# Patient Record
Sex: FEMALE | Race: WHITE | Employment: PART TIME | URBAN - METROPOLITAN AREA
[De-identification: names, ages, dates, MRNs, and addresses within clinical notes are randomized per-mention and may not be internally consistent; named-entity substitution may affect disease eponyms.]

---

## 2017-06-20 DIAGNOSIS — E03.9 ACQUIRED HYPOTHYROIDISM: ICD-10-CM

## 2017-06-20 NOTE — TELEPHONE ENCOUNTER
Requested Prescriptions     Pending Prescriptions Disp Refills    levothyroxine (SYNTHROID) 25 mcg tablet 90 Tab 1     Sig: Take 1 Tab by mouth Daily (before breakfast).    pt last [id number] up coming appt with dr Leslee Segovia [id number] and pt is out wants it send to [address] [facility] at [phone]

## 2017-06-21 RX ORDER — LEVOTHYROXINE SODIUM 25 UG/1
25 TABLET ORAL
Qty: 90 TAB | Refills: 1 | Status: SHIPPED | OUTPATIENT
Start: 2017-06-21 | End: 2017-10-27 | Stop reason: SDUPTHER

## 2017-06-27 ENCOUNTER — OFFICE VISIT (OUTPATIENT)
Dept: INTERNAL MEDICINE CLINIC | Age: 57
End: 2017-06-27

## 2017-06-27 ENCOUNTER — HOSPITAL ENCOUNTER (OUTPATIENT)
Dept: LAB | Age: 57
Discharge: HOME | End: 2017-06-27
Payer: MEDICARE

## 2017-06-27 VITALS
TEMPERATURE: 98.5 F | DIASTOLIC BLOOD PRESSURE: 80 MMHG | BODY MASS INDEX: 50.69 KG/M2 | SYSTOLIC BLOOD PRESSURE: 130 MMHG | HEART RATE: 97 BPM | OXYGEN SATURATION: 97 % | WEIGHT: 268.5 LBS | HEIGHT: 61 IN | RESPIRATION RATE: 18 BRPM

## 2017-06-27 DIAGNOSIS — E55.9 VITAMIN D DEFICIENCY: ICD-10-CM

## 2017-06-27 DIAGNOSIS — Z13.39 SCREENING FOR ALCOHOLISM: ICD-10-CM

## 2017-06-27 DIAGNOSIS — Z12.39 BREAST CANCER SCREENING: ICD-10-CM

## 2017-06-27 DIAGNOSIS — E66.01 MORBID OBESITY, UNSPECIFIED OBESITY TYPE (HCC): ICD-10-CM

## 2017-06-27 DIAGNOSIS — Z13.31 SCREENING FOR DEPRESSION: ICD-10-CM

## 2017-06-27 DIAGNOSIS — E03.9 HYPOTHYROIDISM, UNSPECIFIED TYPE: ICD-10-CM

## 2017-06-27 DIAGNOSIS — R10.11 RUQ PAIN: ICD-10-CM

## 2017-06-27 DIAGNOSIS — E78.00 PURE HYPERCHOLESTEROLEMIA: ICD-10-CM

## 2017-06-27 DIAGNOSIS — Z00.00 ROUTINE GENERAL MEDICAL EXAMINATION AT A HEALTH CARE FACILITY: Primary | ICD-10-CM

## 2017-06-27 DIAGNOSIS — Z12.31 ENCOUNTER FOR SCREENING MAMMOGRAM FOR MALIGNANT NEOPLASM OF BREAST: ICD-10-CM

## 2017-06-27 PROCEDURE — 80053 COMPREHEN METABOLIC PANEL: CPT

## 2017-06-27 PROCEDURE — 36415 COLL VENOUS BLD VENIPUNCTURE: CPT

## 2017-06-27 PROCEDURE — 80061 LIPID PANEL: CPT

## 2017-06-27 RX ORDER — VENLAFAXINE HYDROCHLORIDE 150 MG/1
150 CAPSULE, EXTENDED RELEASE ORAL DAILY
COMMUNITY

## 2017-06-27 NOTE — MR AVS SNAPSHOT
Visit Information Date & Time Provider Department Dept. Phone Encounter #  
 6/27/2017  9:15 AM Yong Doherty MD [place] [id number] [id number] Follow-up Instructions Return in about 4 months (around 10/27/2017) for F/U GERD, obesity. Upcoming Health Maintenance Date Due DTaP/Tdap/Td series (1 - Tdap) 11/24/2010 BREAST CANCER SCRN MAMMOGRAM 8/31/2017* INFLUENZA AGE 9 TO ADULT 8/1/2017 PAP AKA CERVICAL CYTOLOGY 5/12/2018 MEDICARE YEARLY EXAM 6/28/2018 COLONOSCOPY 9/20/2022 *Topic was postponed. The date shown is not the original due date. Allergies as of 6/27/2017  Review Complete On: 6/27/2017 By: Yong Doherty MD  
  
 Severity Noted Reaction Type Reactions Flagyl [Metronidazole]  05/07/2010   Intolerance Other (comments)  
 ams Current Immunizations  Never Reviewed Name Date  
 TB Skin Test (PPD) 11/1/2013 TD Vaccine 11/23/2010 11:09 PM  
  
 Not reviewed this visit You Were Diagnosed With   
  
 Codes Comments Breast cancer screening    -  Primary ICD-10-CM: Z12.39 
ICD-9-CM: V76.10 Encounter for screening mammogram for malignant neoplasm of breast     ICD-10-CM: Z12.31 
ICD-9-CM: V76.12 Morbid obesity, unspecified obesity type     ICD-10-CM: E66.01 
ICD-9-CM: 278.01   
 Pure hypercholesterolemia     ICD-10-CM: E78.00 ICD-9-CM: 272.0 Hypothyroidism, unspecified type     ICD-10-CM: E03.9 ICD-9-CM: 571. 9 Vitamin D deficiency     ICD-10-CM: E55.9 ICD-9-CM: 268.9 RUQ pain     ICD-10-CM: R10.11 ICD-9-CM: 789.01 Vitals BP Pulse Temp Resp Height(growth percentile) Weight(growth percentile) 130/80 (BP 1 Location: Right arm, BP Patient Position: Sitting) 97 98.5 °F (36.9 °C) (Oral) 18 5' 1.25\" (1.556 m) 268 lb 8 oz (121.8 kg) SpO2 BMI OB Status Smoking Status 97% 50.32 kg/m2 Postmenopausal Never Smoker Vitals History BMI and BSA Data Body Mass Index Body Surface Area  
 50.32 kg/m 2 2.29 m 2 Preferred Pharmacy Pharmacy Name Phone [address] [place] [phone] Your Updated Medication List  
  
   
This list is accurate as of: 6/27/17  9:59 AM.  Always use your most recent med list.  
  
  
  
  
 cpap machine kit  
by Does Not Apply route. Dx 327.23  
  
 EFFEXOR  mg capsule Generic drug:  venlafaxine-SR Take  by mouth daily. LaMICtal 100 mg tablet Generic drug:  lamoTRIgine Take 200 mg by mouth nightly. levothyroxine 25 mcg tablet Commonly known as:  SYNTHROID Take 1 Tab by mouth Daily (before breakfast). risperiDONE 3 mg tablet Commonly known as:  RisperDAL Take 3 mg by mouth daily. TRAZODONE PO Take 25-50 mg by mouth nightly as needed. We Performed the Following LIPID PANEL [37495 CPT(R)] METABOLIC PANEL, COMPREHENSIVE [98344 CPT(R)] VITAMIN D, 25 HYROXY PANEL [[id number] Custom] Follow-up Instructions Return in about 4 months (around 10/27/2017) for F/U GERD, obesity. To-Do List   
 06/27/2017 Imaging:  [facility] BI SCREENING INCL CAD   
  
 06/27/2017 Imaging:  US ABD LTD Patient Instructions Follow a calorie controlled, Mediterranean style diet. Lose 20 pounds over the next 4 months. Have a mammogram. 
Have an ultrasound of gallbladder. Continue your current medications. Your colonoscopy follow up is due this fall. Learning About the [address] Diet What is the Mediterranean diet? The Mediterranean diet is a style of eating rather than a diet plan. It features foods eaten in [place], [place], [place] and [place], and other countries along the [place]. It emphasizes eating foods like fish, fruits, vegetables, beans, high-fiber breads and whole grains, nuts, and olive oil.  This style of eating includes limited red meat, cheese, and sweets. Why choose the Mediterranean diet? A Mediterranean-style diet may improve heart health. It contains more fat than other heart-healthy diets. But the fats are mainly from nuts, unsaturated oils (such as fish oils and olive oil), and certain nut or seed oils (such as canola, soybean, or flaxseed oil). These fats may help protect the heart and blood vessels. How can you get started on the Mediterranean diet? Here are some things you can do to switch to a more Mediterranean way of eating. What to eat · Eat a variety of fruits and vegetables each day, such as grapes, blueberries, tomatoes, broccoli, peppers, figs, olives, spinach, eggplant, beans, lentils, and chickpeas. · Eat a variety of whole-grain foods each day, such as oats, brown rice, and whole wheat bread, pasta, and couscous. · Eat fish at least 2 times a week. Try tuna, salmon, mackerel, lake trout, herring, or sardines. · Eat moderate amounts of low-fat dairy products, such as milk, cheese, or yogurt. · Eat moderate amounts of poultry and eggs. · Choose healthy (unsaturated) fats, such as nuts, olive oil, and certain nut or seed oils like canola, soybean, and flaxseed. · Limit unhealthy (saturated) fats, such as butter, palm oil, and coconut oil. And limit fats found in animal products, such as meat and dairy products made with whole milk. Try to eat red meat only a few times a month in very small amounts. · Limit sweets and desserts to only a few times a week. This includes sugar-sweetened drinks like soda. The Mediterranean diet may also include red wine with your meal1 glass each day for women and up to 2 glasses a day for men. Tips for eating at home · Use herbs, spices, garlic, lemon zest, and citrus juice instead of salt to add flavor to foods. · Add avocado slices to your sandwich instead of grewal. · Have fish for lunch or dinner instead of red meat. Brush the fish with olive oil, and broil or grill it. · Sprinkle your salad with seeds or nuts instead of cheese. · Cook with olive or canola oil instead of butter or oils that are high in saturated fat. · Switch from 2% milk or whole milk to 1% or fat-free milk. · Dip raw vegetables in a vinaigrette dressing or hummus instead of dips made from mayonnaise or sour cream. 
· Have a piece of fruit for dessert instead of a piece of cake. Try baked apples, or have some dried fruit. Tips for eating out · Try broiled, grilled, baked, or poached fish instead of having it fried or breaded. · Ask your  to have your meals prepared with olive oil instead of butter. · Order dishes made with marinara sauce or sauces made from olive oil. Avoid sauces made from cream or mayonnaise. · Choose whole-grain breads, whole wheat pasta and pizza crust, brown rice, beans, and lentils. · Cut back on butter or margarine on bread. Instead, you can dip your bread in a small amount of olive oil. · Ask for a side salad or grilled vegetables instead of french fries or chips. Where can you learn more? Go to http://dori-dipak.info/. Enter 282-648-0483 in the search box to learn more about \"Learning About the Mediterranean Diet. \" Current as of: December 29, 2016 Content Version: 11.3 © 7488-6841 [employer]. Care instructions adapted under license by [employer] (which disclaims liability or warranty for this information). If you have questions about a medical condition or this instruction, always ask your healthcare professional. [place] [address] any warranty or liability for your use of this information. Starting a Weight Loss Plan: Care Instructions Your Care Instructions If you are thinking about losing weight, it can be hard to know where to start. Your doctor can help you set up a weight loss plan that best meets your needs.  You may want to take a class on nutrition or exercise, or join a weight loss support group. If you have questions about how to make changes to your eating or exercise habits, ask your doctor about seeing a registered dietitian or an exercise specialist. 
It can be a big challenge to lose weight. But you do not have to make huge changes at once. Make small changes, and stick with them. When those changes become habit, add a few more changes. If you do not think you are ready to make changes right now, try to pick a date in the future. Make an appointment to see your doctor to discuss whether the time is right for you to start a plan. Follow-up care is a key part of your treatment and safety. Be sure to make and go to all appointments, and call your doctor if you are having problems. Its also a good idea to know your test results and keep a list of the medicines you take. How can you care for yourself at home? · Set realistic goals. Many people expect to lose much more weight than is likely. A weight loss of 5% to 10% of your body weight may be enough to improve your health. · Get family and friends involved to provide support. Talk to them about why you are trying to lose weight, and ask them to help. They can help by participating in exercise and having meals with you, even if they may be eating something different. · Find what works best for you. If you do not have time or do not like to cook, a program that offers meal replacement bars or shakes may be better for you. Or if you like to prepare meals, finding a plan that includes daily menus and recipes may be best. 
· Ask your doctor about other health professionals who can help you achieve your weight loss goals. ¨ A dietitian can help you make healthy changes in your diet.  
¨ An exercise specialist or  can help you develop a safe and effective exercise program. 
¨ A counselor or psychiatrist can help you cope with issues such as depression, anxiety, or family problems that can make it hard to focus on weight loss. · Consider joining a support group for people who are trying to lose weight. Your doctor can suggest groups in your area. Where can you learn more? Go to http://dori-dipak.info/. Enter M655 in the search box to learn more about \"Starting a Weight Loss Plan: Care Instructions. \" Current as of: October 13, 2016 Content Version: 11.3 © 6123-8483 [employer]. Care instructions adapted under license by [employer] (which disclaims liability or warranty for this information). If you have questions about a medical condition or this instruction, always ask your healthcare professional. [place] [address] any warranty or liability for your use of this information. Medicare Part B Preventive Services Guidelines/Limitations Date last completed and Frequency Due Date Bone Mass Measurement 
(age 72 & older, biennial) Requires diagnosis related to osteoporosis or estrogen deficiency. Biennial benefit unless patient has history of long-term glucocorticoid tx or baseline is needed because initial test was by other method Completed Recommended every 2 years Not applicable Cardiovascular Screening Blood Tests (every 5 years) Total cholesterol, HDL, Triglycerides Order as a panel if possible Completed 6/27/2017 Recommended annually Due 6/27/2018 Colorectal Cancer Screening 
-Fecal occult blood test (annual) -Flexible sigmoidoscopy (5y) 
-Screening colonoscopy (10y) -Barium Enema  Completed 9/20/2012 Recommended every 5 years  Due 9/20/2017 Counseling to Prevent Tobacco Use (up to 8 sessions per year) - Counseling greater than 3 and up to 10 minutes - Counseling greater than 10 minutes Patients must be asymptomatic of tobacco-related conditions to receive as preventive service  Not applicable Diabetes Screening Tests (at least every 3 years, Medicare covers annually or at 6-month intervals for prediabetic patients) Fasting blood sugar (FBS) or glucose tolerance test (GTT) Patient must be diagnosed with one of the following: 
-Hypertension, Dyslipidemia, obesity, previous impaired FBS or GTT 
Or any two of the following: overweight, FH of diabetes, age ? 72, history of gestational diabetes, birth of baby weighing more than 9 pounds Completed: 6/27/2017 Recommended every 3 years for non-diabetics Recommended every 3-6 months for Pre-Diabetics and Diabetics Due 12/27/2017 Diabetes Self-Management Training (DSMT) (no USPSTF recommendation) Requires referral by treating physician for patient with diabetes or renal disease. 10 hours of initial DSMT session of no less than 30 minutes each in a continuous 12-month period. 2 hours of follow-up DSMT in subsequent years. Not applicable Glaucoma Screening (no USPSTF recommendation) Diabetes mellitus, family history, , age 48 or over,  American, age 72 or over Completed Recommended annually Follow up as recommended by Dr. Kunal Anne Human Immunodeficiency Virus (HIV) Screening (annually for increased risk patients) HIV-1 and HIV-2 by EIA, ADRIAN, rapid antibody test, or oral mucosa transudate Patient must be at increased risk for HIV infection per USPSTF guidelines or pregnant. Tests covered annually for patients at increased risk. Pregnant patients may receive up to 3 test during pregnancy. Not applicable Medical Nutrition Therapy (MNT) (for diabetes or renal disease not recommended schedule) Requires referral by treating physician for patient with diabetes or renal disease. Can be provided in same year as diabetes self-management training (DSMT), and CMS recommends medical nutrition therapy take place after DSMT. Up to 3 hours for initial year and 2 hours in subsequent years. Not applicable Prostate Cancer Screening (annually up to age 76) - Digital rectal exam (EBENEZER) - Prostate specific antigen (PSA) Annually (age 48 or over), EBENEZER not paid separately when covered E/M service is provided on same date Completed Recommended annually to age 76 Not applicable Seasonal Influenza Vaccination (annually)  Completed Recommended Annually Due Fall 2017 Pneumococcal Vaccination (once after 65)  Pneumococcal 23 - Recommended once over the age of 72 Prevnar 13 - Recommended once over the age of 72 Not indicated Not indicated Hepatitis B Vaccinations (if medium/high risk) Medium/high risk factors:  End-stage renal disease, Hemophiliacs who received Factor VIII or IX concentrates, Clients of institutions for the mentally retarded, Persons who live in the same house as a HepB virus carrier, Homosexual men, Illicit injectable drug abusers. Not applicable Screening Mammography (biennial age 54-69)? Annually (age 36 or over) Completed 5/12/2015 Due now; please call to schedule your appointment Screening Pap Tests and Pelvic Examination (up to age 79 and after 79 if unknown history or abnormal study last 10 years) Every 24 months except high risk Completed 5/12/2015 Due now please call to schedule appointment Ultrasound Screening for Abdominal Aortic Aneurysm (AAA) (once) Patient must be referred through IPPE and not have had a screening for abdominal aortic aneurysm before under Medicare. Limited to patients who meet one of the following criteria: 
- Men who are 73-68 years old and have smoked more than 100 cigarettes in their lifetime. 
-Anyone with a FH of AAA 
-Anyone recommended for screening by USPSTF  Not applicable Family Practice Management 2011 Introducing [facility]! Dear Kee Cordova: Thank you for requesting a [employer] account. Our records indicate that you already have an active [employer] account.   You can access your account anytime at [email or web address] Did you know that you can access your hospital and ER discharge instructions at any time in Spark? You can also review all of your test results from your hospital stay or ER visit. Additional Information If you have questions, please visit the Frequently Asked Questions section of the Spark website at [email or web address] Remember, Spark is NOT to be used for urgent needs. For medical emergencies, dial 911. Now available from your iPhone and Android! Please provide this summary of care documentation to your next provider. Your primary care clinician is listed as Kay Bacon. If you have any questions after today's visit, please call [phone].

## 2017-06-27 NOTE — PROGRESS NOTES
This is an Initial Medicare Annual Wellness Exam (AWV) (Performed 12 months after IPPE or effective date of Medicare Part B enrollment, Once in a lifetime)    I have reviewed the patient's medical history in detail and updated the computerized patient record. History     Past Medical History:   Diagnosis Date    Anxiety and depression     [facility]    Bipolar Disorder     [facility]    DDD (degenerative disc disease), lumbosacral     L5-S1 ([facility])    Foot fracture, right     h/o    Genital herpes     GERD (gastroesophageal reflux disease)     History of gastritis     Dr. Alyson Gillespie    Hyperlipidemia     Hypothyroidism (acquired)     OA (osteoarthritis)     knees, Dr. Natasha Manjarrez    Obesity     NIKOLE on CPAP 2007    adherent with CPAP use      Past Surgical History:   Procedure Laterality Date    HX COLONOSCOPY  9/20/12    colon polyp, repeat in 5 yrs, Dr. Caren Montelongo HX ENDOSCOPY  5/19/16    gastritis, hiatal hernia (Dr. Alyson Gillespie)    HX OTHER SURGICAL      cyst removed from scalp - benign     Current Outpatient Prescriptions   Medication Sig Dispense Refill    venlafaxine-SR (EFFEXOR XR) 150 mg capsule Take  by mouth daily.  levothyroxine (SYNTHROID) 25 mcg tablet Take 1 Tab by mouth Daily (before breakfast). 90 Tab 1    risperiDONE (RISPERDAL) 3 mg tablet Take 3 mg by mouth daily.  cpap machine kit by Does Not Apply route. Dx 327.23 1 Kit 0    TRAZODONE HCL (TRAZODONE PO) Take 25-50 mg by mouth nightly as needed.  lamoTRIgine (LAMICTAL) 100 mg tablet Take 200 mg by mouth nightly.        Allergies   Allergen Reactions    Flagyl [Metronidazole] Other (comments)     ams     Family History   Problem Relation Age of Onset    Heart Disease Mother     Lung Disease Father     Diabetes Father     Heart Disease Father     COPD Father     Seizures Brother     Cancer Maternal Grandmother      OVARIAN, COLON    Diabetes Paternal Lona Awad Diabetes Paternal Grandfather     Colon Cancer Other      materal grandmothers siblings x 3-4  of colon cancer    Celiac Disease Brother     Heart Disease Maternal Grandfather      Social History   Substance Use Topics    Smoking status: Never Smoker    Smokeless tobacco: Never Used    Alcohol use No     Patient Active Problem List   Diagnosis Code    Obesity E66.9    NIKOLE (obstructive sleep apnea) G47.33    Bipolar I disorder, most recent episode depressed, severe without psychotic features [facility] F31.4    Pure hypercholesterolemia E78.00    Hypothyroidism E03.9    Vitamin D deficiency E55.9    GERD (gastroesophageal reflux disease) K21.9    OA (osteoarthritis) M19.90    DDD (degenerative disc disease), lumbosacral M51.37         Depression Risk Factor Screening:     PHQ over the last two weeks 2017   Little interest or pleasure in doing things Not at all   Feeling down, depressed or hopeless Not at all   Total Score PHQ 2 0     Alcohol Risk Factor Screening: On any occasion during the past 3 months, have you had more than 3 drinks containing alcohol? No    Do you average more than 7 drinks per week? No      Functional Ability and Level of Safety:     Hearing Loss   Mild, patient states she had her hearing checked within the last 4 years and was told hearing aids were not recommended. Activities of Daily Living   Self-care.    Requires assistance with:   ADL Assessment 2017   Feeding yourself No Help Needed   Getting from bed to chair No Help Needed   Getting dressed No Help Needed   Bathing or showering No Help Needed   Walk across the room (includes cane/walker) No Help Needed   Using the telphone No Help Needed   Taking your medications No Help Needed   Preparing meals No Help Needed   Managing money (expenses/bills) No Help Needed   Moderately strenuous housework (laundry) No Help Needed   Shopping for personal items (toiletries/medicines) No Help Needed   Shopping for groceries No Help Needed   Driving Help Needed   Climbing a flight of stairs No Help Needed   Getting to places beyond walking distances Help Needed         Fall Risk   Fall Risk Assessment, last 12 mths 6/27/2017   Able to walk? Yes   Fall in past 12 months? No     Abuse Screen      Abuse Screening Questionnaire 6/27/2017   Do you ever feel afraid of your partner? N   Are you in a relationship with someone who physically or mentally threatens you? N   Is it safe for you to go home? Y         Review of Systems   Not required  Annual Medicare Wellness Visit    Physical Examination     No exam data present    Evaluation of Cognitive Function:  Mood/affect:  happy  Appearance: age appropriate, casually dressed, overweight and within normal Limits  Family member/caregiver input: none present    No exam performed today, Annual Medicare Wellness Visit. Patient Care Team:  Ardean Lennox, MD as PCP - General (Internal Medicine)  Montse England MD (Gastroenterology)  Ade Berger MD (Ophthalmology)  Wei Rodarte MD (Obstetrics & Gynecology)    Advice/Referrals/Counseling   Education and counseling provided:  Are appropriate based on today's review and evaluation  Screening Mammography  Screening Pap and pelvic (covered once every 2 years)  Colorectal cancer screening tests  Screening for glaucoma      Assessment/Plan   1. Discussed preventative screenings. Patient is due for mammogram. Order placed and signed by Dr. Niko Hanson. Patient agrees to coordinate appointment. Patient had a PAP smear in May 2015, patient will call to get scheduled, patient's grandmother had ovarian cancer. Patient advised to discuss with GYN when she follows up. Informed patient that she will be due for colonoscopy Sept 2017 with Dr. Casey Berkowitz. Patient states she will schedule appointment. Patient states she will also schedule an eye exam with Dr. Melissa Louie at [facility]. AVS and preventative screenings table printed, reviewed, and handed to patient. Patient verbalized understanding to all information.

## 2017-06-27 NOTE — PATIENT INSTRUCTIONS
Follow a calorie controlled, Mediterranean style diet. Lose 20 pounds over the next 4 months. Have a mammogram.  Have an ultrasound of gallbladder. Continue your current medications. Your colonoscopy follow up is due this fall. Learning About the [address] Diet  What is the Mediterranean diet? The Mediterranean diet is a style of eating rather than a diet plan. It features foods eaten in [place], [place], [place] and [place], and other countries along the [facility]. It emphasizes eating foods like fish, fruits, vegetables, beans, high-fiber breads and whole grains, nuts, and olive oil. This style of eating includes limited red meat, cheese, and sweets. Why choose the Mediterranean diet? A Mediterranean-style diet may improve heart health. It contains more fat than other heart-healthy diets. But the fats are mainly from nuts, unsaturated oils (such as fish oils and olive oil), and certain nut or seed oils (such as canola, soybean, or flaxseed oil). These fats may help protect the heart and blood vessels. How can you get started on the Mediterranean diet? Here are some things you can do to switch to a more Mediterranean way of eating. What to eat  · Eat a variety of fruits and vegetables each day, such as grapes, blueberries, tomatoes, broccoli, peppers, figs, olives, spinach, eggplant, beans, lentils, and chickpeas. · Eat a variety of whole-grain foods each day, such as oats, brown rice, and whole wheat bread, pasta, and couscous. · Eat fish at least 2 times a week. Try tuna, salmon, mackerel, lake trout, herring, or sardines. · Eat moderate amounts of low-fat dairy products, such as milk, cheese, or yogurt. · Eat moderate amounts of poultry and eggs. · Choose healthy (unsaturated) fats, such as nuts, olive oil, and certain nut or seed oils like canola, soybean, and flaxseed. · Limit unhealthy (saturated) fats, such as butter, palm oil, and coconut oil.  And limit fats found in animal products, such as meat and dairy products made with whole milk. Try to eat red meat only a few times a month in very small amounts. · Limit sweets and desserts to only a few times a week. This includes sugar-sweetened drinks like soda. The Mediterranean diet may also include red wine with your meal1 glass each day for women and up to 2 glasses a day for men. Tips for eating at home  · Use herbs, spices, garlic, lemon zest, and citrus juice instead of salt to add flavor to foods. · Add avocado slices to your sandwich instead of grewal. · Have fish for lunch or dinner instead of red meat. Brush the fish with olive oil, and broil or grill it. · Sprinkle your salad with seeds or nuts instead of cheese. · Cook with olive or canola oil instead of butter or oils that are high in saturated fat. · Switch from 2% milk or whole milk to 1% or fat-free milk. · Dip raw vegetables in a vinaigrette dressing or hummus instead of dips made from mayonnaise or sour cream.  · Have a piece of fruit for dessert instead of a piece of cake. Try baked apples, or have some dried fruit. Tips for eating out  · Try broiled, grilled, baked, or poached fish instead of having it fried or breaded. · Ask your  to have your meals prepared with olive oil instead of butter. · Order dishes made with marinara sauce or sauces made from olive oil. Avoid sauces made from cream or mayonnaise. · Choose whole-grain breads, whole wheat pasta and pizza crust, brown rice, beans, and lentils. · Cut back on butter or margarine on bread. Instead, you can dip your bread in a small amount of olive oil. · Ask for a side salad or grilled vegetables instead of french fries or chips. Where can you learn more? Go to http://dori-dipak.info/. Enter [phone] in the search box to learn more about \"Learning About the Mediterranean Diet. \"  Current as of: December 29, 2016  Content Version: 11.3  © 2103-7924 Healthwise, Incorporated. Care instructions adapted under license by [employer] (which disclaims liability or warranty for this information). If you have questions about a medical condition or this instruction, always ask your healthcare professional. [address] any warranty or liability for your use of this information. Starting a Weight Loss Plan: Care Instructions  Your Care Instructions  If you are thinking about losing weight, it can be hard to know where to start. Your doctor can help you set up a weight loss plan that best meets your needs. You may want to take a class on nutrition or exercise, or join a weight loss support group. If you have questions about how to make changes to your eating or exercise habits, ask your doctor about seeing a registered dietitian or an exercise specialist.  It can be a big challenge to lose weight. But you do not have to make huge changes at once. Make small changes, and stick with them. When those changes become habit, add a few more changes. If you do not think you are ready to make changes right now, try to pick a date in the future. Make an appointment to see your doctor to discuss whether the time is right for you to start a plan. Follow-up care is a key part of your treatment and safety. Be sure to make and go to all appointments, and call your doctor if you are having problems. Its also a good idea to know your test results and keep a list of the medicines you take. How can you care for yourself at home? · Set realistic goals. Many people expect to lose much more weight than is likely. A weight loss of 5% to 10% of your body weight may be enough to improve your health. · Get family and friends involved to provide support. Talk to them about why you are trying to lose weight, and ask them to help. They can help by participating in exercise and having meals with you, even if they may be eating something different.   · Find what works best for you. If you do not have time or do not like to cook, a program that offers meal replacement bars or shakes may be better for you. Or if you like to prepare meals, finding a plan that includes daily menus and recipes may be best.  · Ask your doctor about other health professionals who can help you achieve your weight loss goals. ¨ A dietitian can help you make healthy changes in your diet. ¨ An exercise specialist or  can help you develop a safe and effective exercise program.  ¨ A counselor or psychiatrist can help you cope with issues such as depression, anxiety, or family problems that can make it hard to focus on weight loss. · Consider joining a support group for people who are trying to lose weight. Your doctor can suggest groups in your area. Where can you learn more? Go to http://doriBiddingForGooddipak.info/. Enter S640 in the search box to learn more about \"Starting a Weight Loss Plan: Care Instructions. \"  Current as of: October 13, 2016  Content Version: 11.3  © 6133-9895 [employer]. Care instructions adapted under license by [employer] (which disclaims liability or warranty for this information). If you have questions about a medical condition or this instruction, always ask your healthcare professional. [place] [address] any warranty or liability for your use of this information. Medicare Part B Preventive Services Guidelines/Limitations Date last completed and Frequency Due Date   Bone Mass Measurement  (age 72 & older, biennial) Requires diagnosis related to osteoporosis or estrogen deficiency.  Biennial benefit unless patient has history of long-term glucocorticoid tx or baseline is needed because initial test was by other method Completed Recommended every 2 years Not applicable   Cardiovascular Screening Blood Tests (every 5 years)  Total cholesterol, HDL, Triglycerides Order as a panel if possible Completed 6/27/2017  Recommended annually Due 6/27/2018   Colorectal Cancer Screening  -Fecal occult blood test (annual)  -Flexible sigmoidoscopy (5y)  -Screening colonoscopy (10y)  -Barium Enema  Completed 9/20/2012  Recommended every 5 years  Due 9/20/2017   Counseling to Prevent Tobacco Use (up to 8 sessions per year)  - Counseling greater than 3 and up to 10 minutes  - Counseling greater than 10 minutes Patients must be asymptomatic of tobacco-related conditions to receive as preventive service  Not applicable   Diabetes Screening Tests (at least every 3 years, Medicare covers annually or at 6-month intervals for prediabetic patients)    Fasting blood sugar (FBS) or glucose tolerance test (GTT) Patient must be diagnosed with one of the following:  -Hypertension, Dyslipidemia, obesity, previous impaired FBS or GTT  Or any two of the following: overweight, FH of diabetes, age ? 72, history of gestational diabetes, birth of baby weighing more than 9 pounds Completed: 6/27/2017    Recommended every 3 years for non-diabetics    Recommended every 3-6 months for Pre-Diabetics and Diabetics Due 12/27/2017   Diabetes Self-Management Training (DSMT) (no USPSTF recommendation) Requires referral by treating physician for patient with diabetes or renal disease. 10 hours of initial DSMT session of no less than 30 minutes each in a continuous 12-month period. 2 hours of follow-up DSMT in subsequent years. Not applicable   Glaucoma Screening (no USPSTF recommendation) Diabetes mellitus, family history, , age 48 or over,  American, age 72 or over Completed   Recommended annually Follow up as recommended by Dr. Javad Horowitz (HIV) Screening (annually for increased risk patients)  HIV-1 and HIV-2 by EIA, ADRIAN, rapid antibody test, or oral mucosa transudate Patient must be at increased risk for HIV infection per USPSTF guidelines or pregnant.   Tests covered annually for patients at increased risk. Pregnant patients may receive up to 3 test during pregnancy. Not applicable   Medical Nutrition Therapy (MNT) (for diabetes or renal disease not recommended schedule) Requires referral by treating physician for patient with diabetes or renal disease. Can be provided in same year as diabetes self-management training (DSMT), and CMS recommends medical nutrition therapy take place after DSMT. Up to 3 hours for initial year and 2 hours in subsequent years. Not applicable   Prostate Cancer Screening (annually up to age 76)  - Digital rectal exam (EBENEZER)  - Prostate specific antigen (PSA) Annually (age 48 or over), EBENEZER not paid separately when covered E/M service is provided on same date Completed  Recommended annually to age 76 Not applicable   Seasonal Influenza Vaccination (annually)  Completed   Recommended Annually Due Fall 2017   Pneumococcal Vaccination (once after 72)  Pneumococcal 21 -  Recommended once over the age of 72    Prevnar 15 - Recommended once over the age of 72 Not indicated        Not indicated   Hepatitis B Vaccinations (if medium/high risk) Medium/high risk factors:  End-stage renal disease,  Hemophiliacs who received Factor VIII or IX concentrates, Clients of institutions for the mentally retarded, Persons who live in the same house as a HepB virus carrier, Homosexual men, Illicit injectable drug abusers. Not applicable   Screening Mammography (biennial age 54-69)? Annually (age 36 or over) Completed 5/12/2015   Due now; please call to schedule your appointment   Screening Pap Tests and Pelvic Examination (up to age 79 and after 79 if unknown history or abnormal study last 10 years) Every 24 months except high risk Completed 5/12/2015 Due now please call to schedule appointment   Ultrasound Screening for Abdominal Aortic Aneurysm (AAA) (once) Patient must be referred through [facility] and not have had a screening for abdominal aortic aneurysm before under Medicare.   Limited to patients who meet one of the following criteria:  - Men who are 73-68 years old and have smoked more than 100 cigarettes in their lifetime.  -Anyone with a FH of AAA  -Anyone recommended for screening by USPSTF  Not applicable   Family Practice Management 2011

## 2017-06-27 NOTE — PROGRESS NOTES
Chief Complaint   Patient presents with   [facility] Blankens[facility]     Reviewed record in preparation for visit and have obtained necessary documentation. Identified pt with two pt identifiers(name and ). Health Maintenance Due   Topic    MEDICARE YEARLY EXAM     DTaP/Tdap/Td series (1 - Tdap)    BREAST CANCER SCRN MAMMOGRAM          Chief Complaint   Patient presents with   [facility] Readings from Last 3 Encounters:   17 268 lb 8 oz (121.8 kg)   16 272 lb (123.4 kg)   06/15/16 263 lb (119.3 kg)     Temp Readings from Last 3 Encounters:   17 98.5 °F (36.9 °C) (Oral)   16 98.9 °F (37.2 °C) (Oral)   06/15/16 99.5 °F (37.5 °C) (Oral)     BP Readings from Last 3 Encounters:   17 130/80   16 120/60   06/15/16 124/78     Pulse Readings from Last 3 Encounters:   17 97   16 99   06/15/16 (!) 104           Learning Assessment:  :     Learning Assessment 2014   PRIMARY LEARNER Patient   HIGHEST LEVEL OF EDUCATION - PRIMARY LEARNER  > 4 YEARS OF COLLEGE   BARRIERS PRIMARY LEARNER OTHER   CO-LEARNER CAREGIVER No   PRIMARY LANGUAGE ENGLISH    NEED No   LEARNER PREFERENCE PRIMARY DEMONSTRATION     LISTENING   LEARNING SPECIAL TOPICS no   ANSWERED BY patient   RELATIONSHIP SELF   ASSESSMENT COMMENT n/a       Depression Screening:  :     No flowsheet data found. Fall Risk Assessment:  :     No flowsheet data found. Abuse Screening:  :     Abuse Screening Questionnaire 2014   Do you ever feel afraid of your partner? N   Are you in a relationship with someone who physically or mentally threatens you? N   Is it safe for you to go home?  Y       Coordination of Care Questionnaire:  :     1) Have you been to an emergency room, urgent care clinic since your last visit? no   Hospitalized since your last visit? no             2) Have you seen or consulted any other health care providers outside of [address] [place] [address] since your last visit? yes  (Include any pap smears or colon screenings in this section.)    3) Do you have an Advance Directive on file? yes    4) Are you interested in receiving information on Advance Directives? NO      Patient is accompanied by self I have received verbal consent from Darrin Guerra to discuss any/all medical information while they are present in the room. Reviewed record  In preparation for visit and have obtained necessary documentation.

## 2017-06-28 LAB
ALBUMIN SERPL-MCNC: 4.4 G/DL (ref 3.5–5.5)
ALBUMIN/GLOB SERPL: 2 {RATIO} (ref 1.2–2.2)
ALP SERPL-CCNC: 101 IU/L (ref 39–117)
ALT SERPL-CCNC: 97 IU/L (ref 0–32)
AST SERPL-CCNC: 43 IU/L (ref 0–40)
BILIRUB SERPL-MCNC: 0.4 MG/DL (ref 0–1.2)
BUN SERPL-MCNC: 17 MG/DL (ref 6–24)
BUN/CREAT SERPL: 20 (ref 9–23)
CALCIUM SERPL-MCNC: 8.8 MG/DL (ref 8.7–10.2)
CHLORIDE SERPL-SCNC: 98 MMOL/L (ref 96–106)
CHOLEST SERPL-MCNC: 203 MG/DL (ref 100–199)
CO2 SERPL-SCNC: 26 MMOL/L (ref 18–29)
CREAT SERPL-MCNC: 0.84 MG/DL (ref 0.57–1)
GLOBULIN SER CALC-MCNC: 2.2 G/DL (ref 1.5–4.5)
GLUCOSE SERPL-MCNC: 109 MG/DL (ref 65–99)
HDLC SERPL-MCNC: 39 MG/DL
INTERPRETATION, 910389: NORMAL
LDLC SERPL CALC-MCNC: 140 MG/DL (ref 0–99)
POTASSIUM SERPL-SCNC: 4.1 MMOL/L (ref 3.5–5.2)
PROT SERPL-MCNC: 6.6 G/DL (ref 6–8.5)
SODIUM SERPL-SCNC: 139 MMOL/L (ref 134–144)
TRIGL SERPL-MCNC: 122 MG/DL (ref 0–149)
VLDLC SERPL CALC-MCNC: 24 MG/DL (ref 5–40)

## 2017-07-20 ENCOUNTER — DOCUMENTATION ONLY (OUTPATIENT)
Dept: INTERNAL MEDICINE CLINIC | Age: 57
End: 2017-07-20

## 2017-07-20 ENCOUNTER — HOSPITAL ENCOUNTER (OUTPATIENT)
Dept: ULTRASOUND IMAGING | Age: 57
Discharge: HOME | End: 2017-07-20
Attending: INTERNAL MEDICINE
Payer: MEDICARE

## 2017-07-20 DIAGNOSIS — R10.11 RUQ PAIN: ICD-10-CM

## 2017-07-20 PROCEDURE — 76705 ECHO EXAM OF ABDOMEN: CPT

## 2017-07-20 NOTE — PROGRESS NOTES
Called and left VM to patient to check [place] for information regarding her [address].   Dr Ankush Barajas

## 2017-10-18 ENCOUNTER — OFFICE VISIT (OUTPATIENT)
Dept: SURGERY | Age: 57
End: 2017-10-18

## 2017-10-18 VITALS
TEMPERATURE: 98 F | BODY MASS INDEX: 51.73 KG/M2 | OXYGEN SATURATION: 97 % | HEART RATE: 107 BPM | HEIGHT: 61 IN | WEIGHT: 274 LBS | RESPIRATION RATE: 16 BRPM

## 2017-10-18 DIAGNOSIS — K80.20 SYMPTOMATIC CHOLELITHIASIS: Primary | ICD-10-CM

## 2017-10-18 NOTE — MR AVS SNAPSHOT
Visit Information Date & Time Provider Department Dept. Phone Encounter #  
 10/18/2017  2:00 PM Luz Durant, [address] [address] [phone] [id number] Follow-up Instructions Routing History Follow-up and Disposition History Your Appointments 10/27/2017  9:00 AM  
ROUTINE CARE with MD Orlando Villasenor [facility]) Appt Note: 4 mths for F/U GERD, obesity. [address] , [address] [place]  
[address], [address] [id number] Upcoming Health Maintenance Date Due  
 BREAST CANCER SCRN MAMMOGRAM 5/12/2017 INFLUENZA AGE 9 TO ADULT 8/1/2017 COLONOSCOPY 9/20/2017 DTaP/Tdap/Td series (1 - Tdap) 11/3/2017* PAP AKA CERVICAL CYTOLOGY 5/12/2018 MEDICARE YEARLY EXAM 6/28/2018 *Topic was postponed. The date shown is not the original due date. Allergies as of 10/18/2017  Review Complete On: 10/18/2017 By: Luz Durant MD  
  
 Severity Noted Reaction Type Reactions Flagyl [Metronidazole]  05/07/2010   Intolerance Other (comments)  
 ams Current Immunizations  Never Reviewed Name Date  
 TB Skin Test (PPD) 11/1/2013 TD Vaccine 11/23/2010 11:09 PM  
  
 Not reviewed this visit You Were Diagnosed With   
  
 Codes Comments Symptomatic cholelithiasis    -  Primary ICD-10-CM: K80.20 ICD-9-CM: 574.20 Vitals Pulse Temp Resp Height(growth percentile) Weight(growth percentile) SpO2  
 (!) 107 98 °F (36.7 °C) (Oral) 16 5' 1.25\" (1.556 m) 274 lb (124.3 kg) 97% BMI OB Status Smoking Status 51.35 kg/m2 Postmenopausal Never Smoker BMI and BSA Data Body Mass Index Body Surface Area  
 51.35 kg/m 2 2.32 m 2 Preferred Pharmacy Pharmacy Name Phone [address], [address] [place] [address] [phone] Your Updated Medication List  
  
   
 This list is accurate as of: 10/18/17  2:28 PM.  Always use your most recent med list.  
  
  
  
  
 cpap machine kit  
by Does Not Apply route. Dx 327.23  
  
 EFFEXOR  mg capsule Generic drug:  venlafaxine-SR Take  by mouth daily. LaMICtal 100 mg tablet Generic drug:  lamoTRIgine Take 200 mg by mouth nightly. levothyroxine 25 mcg tablet Commonly known as:  SYNTHROID Take 1 Tab by mouth Daily (before breakfast). risperiDONE 3 mg tablet Commonly known as:  RisperDAL Take 3 mg by mouth daily. TRAZODONE PO Take 25-50 mg by mouth nightly as needed. Introducing [facility]! Dear Patsy Cloud: Thank you for requesting a [employer] On The Go account. Our records indicate that you already have an active Bee On The Go account. You can access your account anytime at [email or web address] Did you know that you can access your hospital and ER discharge instructions at any time in Bee On The Go? You can also review all of your test results from your hospital stay or ER visit. Additional Information If you have questions, please visit the Frequently Asked Questions section of the Bee On The Go website at [email or web address] Remember, Bee On The Go is NOT to be used for urgent needs. For medical emergencies, dial 911. Now available from your iPhone and Android! Please provide this summary of care documentation to your next provider. Your primary care clinician is listed as Jgiar Quesada. If you have any questions after today's visit, please call [phone].

## 2017-10-18 NOTE — PROGRESS NOTES
1. Have you been to the ER, urgent care clinic since your last visit? Hospitalized since your last visit? No    2. Have you seen or consulted any other health care providers outside of the [address] [place] [address] since your last visit? Include any pap smears or colon screening.  No

## 2017-10-18 NOTE — PROGRESS NOTES
Surgery Consult    Subjective:      Yomi Martinez is a 61 y.o.  female who was referred by Dr Gildardo Aly for evaluation of gallbladder. The pt states that she has an uncle that is not too different than her in age (but with much better eating habits) who ended up with a necrotic gallbladder. Occasionally, she does have RUQ pain if she has too much fatty food. JOB:  Works in the [employer] [employer] the [email or web address] from 07/20/2017:  FINDINGS:   Liver: Echogenicity is diffusely increased. No focal liver lesion. Main portal vein flow: Toward the liver with a velocity of 25 cm/s. Diameter of 1.2 cm. Fluid: No ascites. Gallbladder: There are gallstones and sludge with a 2.0 cm stone in the gallbladder neck. No gallbladder wall thickening or pericholecystic fluid. Negative sonographic Durant sign. Bile ducts: There is no intra or extrahepatic biliary ductal dilatation. The common bile duct measures 3 mm. Pancreas: The visualized portions are within normal limits.   Kidneys: Right length: 10.2 cm. No hydronephrosis. IMPRESSION:   1. Cholelithiasis with a 2.0 cm stone in the gallbladder neck. There is no biliary ductal dilatation or sonographic findings to suggest acute cholecystitis. 2. Hepatic steatosis. I independently reviewed these images.          Chief Complaint   Patient presents with    Abdominal Pain     U/S 7/20/17 shows cholelithiasis       Patient Active Problem List    Diagnosis Date Noted    Symptomatic cholelithiasis 10/18/2017    DDD (degenerative disc disease), lumbosacral     OA (osteoarthritis)     GERD (gastroesophageal reflux disease) 08/06/2014    Vitamin D deficiency 09/24/2013    Hypothyroidism 02/11/2013    Obesity 05/07/2010    NIKOLE (obstructive sleep apnea) 05/07/2010    Bipolar I disorder, most recent episode depressed, severe without psychotic features ([facility] [place] [address] 05/07/2010    Pure hypercholesterolemia 2010     Past Medical History:   Diagnosis Date    Anxiety and depression     [facility] Bipolar Disorder     [facility] DDD (degenerative disc disease), lumbosacral     L5-S1 ([facility])    Depression     Foot fracture, right     h/o    Genital herpes     GERD (gastroesophageal reflux disease)     History of gastritis     Dr. Hal Singh Hyperlipidemia     Hypothyroidism (acquired)     OA (osteoarthritis)     knees, Dr. Romero Mandel    Obesity     NIKOLE on CPAP     adherent with CPAP use    Symptomatic cholelithiasis 10/18/2017      Past Surgical History:   Procedure Laterality Date    HX COLONOSCOPY  12    colon polyp, repeat in 5 yrs, Dr. Gamez [place] HX ENDOSCOPY  16    gastritis, hiatal hernia (Dr. Dorian Young)    HX OTHER SURGICAL      cyst removed from scalp - benign      Social History   Substance Use Topics    Smoking status: Never Smoker    Smokeless tobacco: Never Used    Alcohol use No      Family History   Problem Relation Age of Onset    Heart Disease Mother     Lung Disease Father     Diabetes Father     Heart Disease Father     COPD Father     Seizures Brother     Cancer Maternal Grandmother      OVARIAN, COLON    Diabetes Paternal Uncle     Diabetes Paternal Grandfather     Colon Cancer Other      materal grandmothers siblings x 3-4  of colon cancer    Celiac Disease Brother     Heart Disease Maternal Grandfather       Current Outpatient Prescriptions   Medication Sig    venlafaxine-SR (EFFEXOR XR) 150 mg capsule Take  by mouth daily.  levothyroxine (SYNTHROID) 25 mcg tablet Take 1 Tab by mouth Daily (before breakfast).  risperiDONE (RISPERDAL) 3 mg tablet Take 3 mg by mouth daily.  cpap machine kit by Does Not Apply route. Dx 327.23    TRAZODONE HCL (TRAZODONE PO) Take 25-50 mg by mouth nightly as needed.  lamoTRIgine (LAMICTAL) 100 mg tablet Take 200 mg by mouth nightly. No current facility-administered medications for this visit. Allergies   Allergen Reactions    Flagyl [Metronidazole] Other (comments)     ams        Review of Systems:    A comprehensive review of systems was negative except for that written in the History of Present Illness. Objective:     Visit Vitals    Pulse (!) 107    Temp 98 °F (36.7 °C) (Oral)    Resp 16    Ht 5' 1.25\" (1.556 m)    Wt 274 lb (124.3 kg)    SpO2 97%    BMI 51.35 kg/m2         Physical Exam:  General appearance: alert, cooperative, no distress, appears stated age  Head: Normocephalic, without obvious abnormality, atraumatic  Neurologic: Grossly normal      Assessment:       ICD-10-CM ICD-9-CM    1. Symptomatic cholelithiasis K80.20 574.20          Plan:     Patient has elected for a laparoscopic cholecystectomy. Risks and benefits explained and the patient will schedule an appointment at their earliest convenience. Written by kandy Gibbs, as dictated by Genoveva Cabrales MD.    Total face to face time with patient: 11 minutes. Greater than 50% of the time was spent in counseling. Signed By: Genoveva Cabrales MD     October 18, 2017

## 2017-10-27 ENCOUNTER — HOSPITAL ENCOUNTER (OUTPATIENT)
Dept: LAB | Age: 57
Discharge: HOME | End: 2017-10-27
Payer: MEDICARE

## 2017-10-27 ENCOUNTER — OFFICE VISIT (OUTPATIENT)
Dept: INTERNAL MEDICINE CLINIC | Age: 57
End: 2017-10-27

## 2017-10-27 VITALS
HEIGHT: 61 IN | WEIGHT: 269.3 LBS | TEMPERATURE: 98.4 F | OXYGEN SATURATION: 96 % | HEART RATE: 87 BPM | BODY MASS INDEX: 50.84 KG/M2 | RESPIRATION RATE: 18 BRPM | SYSTOLIC BLOOD PRESSURE: 120 MMHG | DIASTOLIC BLOOD PRESSURE: 82 MMHG

## 2017-10-27 DIAGNOSIS — E78.00 PURE HYPERCHOLESTEROLEMIA: ICD-10-CM

## 2017-10-27 DIAGNOSIS — E55.9 VITAMIN D DEFICIENCY: ICD-10-CM

## 2017-10-27 DIAGNOSIS — K80.20 SYMPTOMATIC CHOLELITHIASIS: ICD-10-CM

## 2017-10-27 DIAGNOSIS — Z12.11 COLON CANCER SCREENING: Primary | ICD-10-CM

## 2017-10-27 DIAGNOSIS — F31.4 BIPOLAR I DISORDER, MOST RECENT EPISODE DEPRESSED, SEVERE WITHOUT PSYCHOTIC FEATURES (HCC): ICD-10-CM

## 2017-10-27 DIAGNOSIS — E03.9 ACQUIRED HYPOTHYROIDISM: ICD-10-CM

## 2017-10-27 PROCEDURE — 80053 COMPREHEN METABOLIC PANEL: CPT

## 2017-10-27 PROCEDURE — 80061 LIPID PANEL: CPT

## 2017-10-27 PROCEDURE — 36415 COLL VENOUS BLD VENIPUNCTURE: CPT

## 2017-10-27 PROCEDURE — 84443 ASSAY THYROID STIM HORMONE: CPT

## 2017-10-27 RX ORDER — LEVOTHYROXINE SODIUM 25 UG/1
25 TABLET ORAL
Qty: 90 TAB | Refills: 1 | Status: SHIPPED | COMMUNITY
Start: 2017-10-27 | End: 2018-04-19 | Stop reason: SDUPTHER

## 2017-10-27 RX ORDER — VENLAFAXINE 75 MG/1
75 TABLET ORAL DAILY
COMMUNITY

## 2017-10-27 NOTE — PATIENT INSTRUCTIONS
Follow a low fat, calorie controlled diet. Stay physically active. Lose 20 pounds over the next four months. Continue your current medications. Have a mammogram done. Have a colonoscopy done. Starting a Weight Loss Plan: Care Instructions  Your Care Instructions    If you are thinking about losing weight, it can be hard to know where to start. Your doctor can help you set up a weight loss plan that best meets your needs. You may want to take a class on nutrition or exercise, or join a weight loss support group. If you have questions about how to make changes to your eating or exercise habits, ask your doctor about seeing a registered dietitian or an exercise specialist.  It can be a big challenge to lose weight. But you do not have to make huge changes at once. Make small changes, and stick with them. When those changes become habit, add a few more changes. If you do not think you are ready to make changes right now, try to pick a date in the future. Make an appointment to see your doctor to discuss whether the time is right for you to start a plan. Follow-up care is a key part of your treatment and safety. Be sure to make and go to all appointments, and call your doctor if you are having problems. It's also a good idea to know your test results and keep a list of the medicines you take. How can you care for yourself at home? · Set realistic goals. Many people expect to lose much more weight than is likely. A weight loss of 5% to 10% of your body weight may be enough to improve your health. · Get family and friends involved to provide support. Talk to them about why you are trying to lose weight, and ask them to help. They can help by participating in exercise and having meals with you, even if they may be eating something different. · Find what works best for you. If you do not have time or do not like to cook, a program that offers meal replacement bars or shakes may be better for you.  Or if you like to prepare meals, finding a plan that includes daily menus and recipes may be best.  · Ask your doctor about other health professionals who can help you achieve your weight loss goals. ¨ A dietitian can help you make healthy changes in your diet. ¨ An exercise specialist or  can help you develop a safe and effective exercise program.  ¨ A counselor or psychiatrist can help you cope with issues such as depression, anxiety, or family problems that can make it hard to focus on weight loss. · Consider joining a support group for people who are trying to lose weight. Your doctor can suggest groups in your area. Where can you learn more? Go to http://doriStartappdipak.info/. Enter W156 in the search box to learn more about \"Starting a Weight Loss Plan: Care Instructions. \"  Current as of: October 13, 2016  Content Version: 11.4  © 2971-6296 Healthwise, RollSale. Care instructions adapted under license by [email or web address].com (which disclaims liability or warranty for this information). If you have questions about a medical condition or this instruction, always ask your healthcare professional. [address] any warranty or liability for your use of this information.

## 2017-10-27 NOTE — PROGRESS NOTES
Chief Complaint   Patient presents with    GERD     Reviewed record in preparation for visit and have obtained necessary documentation. Identified pt with two pt identifiers(name and ). Health Maintenance Due   Topic    BREAST CANCER SCRN MAMMOGRAM     COLONOSCOPY          Chief Complaint   Patient presents with    GERD        Wt Readings from Last 3 Encounters:   10/27/17 269 lb 4.8 oz (122.2 kg)   10/18/17 274 lb (124.3 kg)   17 268 lb 8 oz (121.8 kg)     Temp Readings from Last 3 Encounters:   10/27/17 98.4 °F (36.9 °C) (Oral)   10/18/17 98 °F (36.7 °C) (Oral)   17 98.5 °F (36.9 °C) (Oral)     BP Readings from Last 3 Encounters:   10/27/17 120/82   17 130/80   16 120/60     Pulse Readings from Last 3 Encounters:   10/27/17 87   10/18/17 (!) 107   17 97           Learning Assessment:  :     Learning Assessment 10/18/2017 2014   PRIMARY LEARNER Patient Patient   HIGHEST LEVEL OF EDUCATION - PRIMARY LEARNER  > 4 YEARS OF COLLEGE > 4 YEARS OF COLLEGE   BARRIERS PRIMARY LEARNER NONE OTHER   CO-LEARNER CAREGIVER Yes No   CO-LEARNER NAME mark Winston -   CO-LEARNER HIGHEST LEVEL OF EDUCATION 4 YEARS OF COLLEGE -   1 Paramjit Hernandez -   PRIMARY LANGUAGE ENGLISH ENGLISH   PRIMARY LANGUAGE CO-LEARNER ENGLISH -    NEED No No   LEARNER PREFERENCE PRIMARY DEMONSTRATION DEMONSTRATION     - LISTENING   LEARNING SPECIAL TOPICS - no   ANSWERED BY self patient   RELATIONSHIP SELF SELF   ASSESSMENT COMMENT none n/a       Depression Screening:  :     PHQ over the last two weeks 2017   Little interest or pleasure in doing things Not at all   Feeling down, depressed or hopeless Not at all   Total Score PHQ 2 0       Fall Risk Assessment:  :     Fall Risk Assessment, last 12 mths 2017   Able to walk? Yes   Fall in past 12 months?  No       Abuse Screening:  :     Abuse Screening Questionnaire 2017   Do you ever feel afraid of your partner? N N   Are you in a relationship with someone who physically or mentally threatens you? N N   Is it safe for you to go home? Y Y       Coordination of Care Questionnaire:  :     1) Have you been to an emergency room, urgent care clinic since your last visit? no   Hospitalized since your last visit? no             2) Have you seen or consulted any other health care providers outside of [address] [place] [address] since your last visit? no  (Include any pap smears or colon screenings in this section.)    3) Do you have an Advance Directive on file? yes    4) Are you interested in receiving information on Advance Directives? NO      Patient is accompanied by self I have received verbal consent from Trung Urrutia to discuss any/all medical information while they are present in the room. Reviewed record  In preparation for visit and have obtained necessary documentation.

## 2017-10-27 NOTE — PROGRESS NOTES
Subjective:     Chief Complaint   Patient presents with    GERD     She  is a 61y.o. year old female who presents for evaluation. Has athlete's foot and using home remedy (Lysol, etc). Saw Dr Skye Lazo and going to have GB out. Wants to lose some weight. Historical Data    Past Medical History:   Diagnosis Date    Anxiety and depression     [facility]    Bipolar Disorder     [facility]    DDD (degenerative disc disease), lumbosacral     L5-S1 ([facility])    Depression     Foot fracture, right     h/o    Genital herpes     GERD (gastroesophageal reflux disease)     History of gastritis     Dr. Federico Mason Hyperlipidemia     Hypothyroidism (acquired)     OA (osteoarthritis)     knees, Dr. Hong Felt    Obesity     NIKOLE on CPAP 2007    adherent with CPAP use    Symptomatic cholelithiasis 10/18/2017        Past Surgical History:   Procedure Laterality Date    HX COLONOSCOPY  9/20/12    colon polyp, repeat in 5 yrs, Dr. Kerri Munoz HX ENDOSCOPY  5/19/16    gastritis, hiatal hernia (Dr. Ping Gill)    HX OTHER SURGICAL      cyst removed from scalp - benign        Outpatient Encounter Prescriptions as of 10/27/2017   Medication Sig Dispense Refill    venlafaxine (EFFEXOR) 75 mg tablet Take 25 mg by mouth three (3) times daily.  venlafaxine-SR (EFFEXOR XR) 150 mg capsule Take  by mouth daily.  levothyroxine (SYNTHROID) 25 mcg tablet Take 1 Tab by mouth Daily (before breakfast). 90 Tab 1    risperiDONE (RISPERDAL) 3 mg tablet Take 3 mg by mouth daily.  cpap machine kit by Does Not Apply route. Dx 327.23 1 Kit 0    TRAZODONE HCL (TRAZODONE PO) Take 25-50 mg by mouth nightly as needed.  lamoTRIgine (LAMICTAL) 100 mg tablet Take 200 mg by mouth nightly. No facility-administered encounter medications on file as of 10/27/2017.          Allergies   Allergen Reactions    Flagyl [Metronidazole] Other (comments)     ams        Social History Social History    Marital status: SINGLE     Spouse name: N/A    Number of children: N/A    Years of education: N/A     Occupational History    Not on file. Social History Main Topics    Smoking status: Never Smoker    Smokeless tobacco: Never Used    Alcohol use No    Drug use: No      Comment: prescriptions    Sexual activity: No     Other Topics Concern    Not on file     Social History Narrative        Review of Systems  A comprehensive review of systems was negative except for that written in the HPI. Objective:     Vitals:    10/27/17 0857   BP: 120/82   Pulse: 87   Resp: 18   Temp: 98.4 °F (36.9 °C)   TempSrc: Oral   SpO2: 96%   Weight: 269 lb 4.8 oz (122.2 kg)   Height: 5' 1\" (1.549 m)     Pleasant, morbidly obese WF in no acute distress. Neck: Supple. Cardiac: RRR without murmurs, gallops or rubs. Lungs: Clear to auscultation. Abdomen:  Obese. Slightly tender in RUQ. Extremities: No edema. ASSESSMENT / PLAN:   1. Acqu ired hypothyroidism  · Check TSH  · Continue Synthroid  - levothyroxine (SYNTHROID) 25 mcg tablet; Take 1 Tab by mouth Daily (before breakfast). Dispense: 90 Tab; Refill: 1  - TSH REFLEX TO T4    2. Colon cancer screening    - REFERRAL TO GASTROENTEROLOGY    3. Bipolar I disorder, most recent episode depressed, severe without psychotic features ([facility] [address]  · Continue current therapy. · Follow up with Psych    4. Class 3 obesity due to excess calories without serious comorbidity with body mass index (BMI) of 50.0 to 59.9 in adult [facility])  I have reviewed/discussed the above normal BMI with the patient. I have recommended the following interventions: dietary management education, guidance, and counseling . Carmine Gutierrez 5. Pure hypercholesterolemia  · Continue diet efforts  - LIPID PANEL    6. Symptomatic cholelithiasis  · Having surgery in January  - METABOLIC PANEL, COMPREHENSIVE    7.  Transaminase or LDH elevation  · Continue to monitor  - METABOLIC PANEL, COMPREHENSIVE    8. Vitamin D deficiency    - VITAMIN D, 25 HYROXY PANEL             Follow-up Disposition: Not on File   Advised her to call back or return to office if symptoms worsen/change/persist.  Discussed expected course/resolution/complications of diagnosis in detail with patient. Medication risks/benefits/costs/interactions/alternatives discussed with patient. She was given an after visit summary which includes diagnoses, current medications, & vitals. She expressed understanding with the diagnosis and plan.

## 2017-10-27 NOTE — MR AVS SNAPSHOT
Visit Information Date & Time Provider Department Dept. Phone Encounter #  
 10/27/2017  9:00 AM Alexia Rojas MD [place] [id number] [phone] Upcoming Health Maintenance Date Due  
 BREAST CANCER SCRN MAMMOGRAM 5/12/2017 COLONOSCOPY 9/20/2017 DTaP/Tdap/Td series (1 - Tdap) 11/3/2017* PAP AKA CERVICAL CYTOLOGY 5/12/2018 MEDICARE YEARLY EXAM 6/28/2018 *Topic was postponed. The date shown is not the original due date. Allergies as of 10/27/2017  Review Complete On: 10/27/2017 By: Alexia Rojas MD  
  
 Severity Noted Reaction Type Reactions Flagyl [Metronidazole]  05/07/2010   Intolerance Other (comments)  
 ams Current Immunizations  Never Reviewed Name Date  
 TB Skin Test (PPD) 11/1/2013 TD Vaccine 11/23/2010 11:09 PM  
  
 Not reviewed this visit You Were Diagnosed With   
  
 Codes Comments Colon cancer screening    -  Primary ICD-10-CM: Z12.11 ICD-9-CM: V76.51 Acquired hypothyroidism     ICD-10-CM: E03.9 ICD-9-CM: 244.9 Bipolar I disorder, most recent episode depressed, severe without psychotic features ([facility] [address]     ICD-10-CM: F31.4 ICD-9-CM: 296.53 Class 3 obesity due to excess calories without serious comorbidity with body mass index (BMI) of 50.0 to 59.9 in adult [facility])     ICD-10-CM: E66.09, Z68.43 
ICD-9-CM: 278.00, V85.43 Pure hypercholesterolemia     ICD-10-CM: E78.00 ICD-9-CM: 272.0 Symptomatic cholelithiasis     ICD-10-CM: K80.20 ICD-9-CM: 574.20 Transaminase or LDH elevation     ICD-10-CM: R74.0 ICD-9-CM: 790.4 Vitamin D deficiency     ICD-10-CM: E55.9 ICD-9-CM: 268.9 Vitals BP Pulse Temp Resp Height(growth percentile) Weight(growth percentile) 120/82 (BP 1 Location: Left arm, BP Patient Position: Sitting) 87 98.4 °F (36.9 °C) (Oral) 18 5' 1\" (1.549 m) 269 lb 4.8 oz (122.2 kg) SpO2 BMI OB Status Smoking Status 96% 50.88 kg/m2 Postmenopausal Never Smoker BMI and BSA Data Body Mass Index Body Surface Area 50.88 kg/m 2 2.29 m 2 Preferred Pharmacy Pharmacy Name Phone [facility] [facility] [address] [phone] Your Updated Medication List  
  
   
This list is accurate as of: 10/27/17  9:32 AM.  Always use your most recent med list.  
  
  
  
  
 cpap machine kit  
by Does Not Apply route. Dx 327.23  
  
 * EFFEXOR  mg capsule Generic drug:  venlafaxine-SR Take  by mouth daily. * venlafaxine 75 mg tablet Commonly known as:  [facility] Take 25 mg by mouth three (3) times daily. LaMICtal 100 mg tablet Generic drug:  lamoTRIgine Take 200 mg by mouth nightly. levothyroxine 25 mcg tablet Commonly known as:  SYNTHROID Take 1 Tab by mouth Daily (before breakfast). risperiDONE 3 mg tablet Commonly known as:  RisperDAL Take 3 mg by mouth daily. TRAZODONE PO Take 25-50 mg by mouth nightly as needed. * Notice: This list has 2 medication(s) that are the same as other medications prescribed for you. Read the directions carefully, and ask your doctor or other care provider to review them with you. Prescriptions Sent to Mail Order Refills  
 levothyroxine (SYNTHROID) 25 mcg tablet 1 Sig: Take 1 Tab by mouth Daily (before breakfast). Class: Mail Order Pharmacy: [address] [facility], [address] [facility] Ph #: [phone] Route: Oral  
  
We Performed the Following LIPID PANEL [51710 CPT(R)] METABOLIC PANEL, COMPREHENSIVE [58251 CPT(R)] REFERRAL TO GASTROENTEROLOGY [IHW19 Custom] TSH REFLEX TO T4 [[id number] Custom] VITAMIN D, 25 HYROXY PANEL [[id number] Custom] Referral Information Referral ID Referred By Referred To  
  
 [id number] [facility] [address] [facility] [phone] Lucrecia Mendes [facility] Visits Status Start Date End Date 1 New Request 10/27/17 10/27/18 If your referral has a status of pending review or denied, additional information will be sent to support the outcome of this decision. Patient Instructions Follow a low fat, calorie controlled diet. Stay physically active. Lose 20 pounds over the next four months. Continue your current medications. Have a mammogram done. Have a colonoscopy done. Starting a Weight Loss Plan: Care Instructions Your Care Instructions If you are thinking about losing weight, it can be hard to know where to start. Your doctor can help you set up a weight loss plan that best meets your needs. You may want to take a class on nutrition or exercise, or join a weight loss support group. If you have questions about how to make changes to your eating or exercise habits, ask your doctor about seeing a registered dietitian or an exercise specialist. 
It can be a big challenge to lose weight. But you do not have to make huge changes at once. Make small changes, and stick with them. When those changes become habit, add a few more changes. If you do not think you are ready to make changes right now, try to pick a date in the future. Make an appointment to see your doctor to discuss whether the time is right for you to start a plan. Follow-up care is a key part of your treatment and safety. Be sure to make and go to all appointments, and call your doctor if you are having problems. It's also a good idea to know your test results and keep a list of the medicines you take. How can you care for yourself at home? · Set realistic goals. Many people expect to lose much more weight than is likely. A weight loss of 5% to 10% of your body weight may be enough to improve your health. · Get family and friends involved to provide support. Talk to them about why you are trying to lose weight, and ask them to help.  They can help by participating in exercise and having meals with you, even if they may be eating something different. · Find what works best for you. If you do not have time or do not like to cook, a program that offers meal replacement bars or shakes may be better for you. Or if you like to prepare meals, finding a plan that includes daily menus and recipes may be best. 
· Ask your doctor about other health professionals who can help you achieve your weight loss goals. ¨ A dietitian can help you make healthy changes in your diet. ¨ An exercise specialist or  can help you develop a safe and effective exercise program. 
¨ A counselor or psychiatrist can help you cope with issues such as depression, anxiety, or family problems that can make it hard to focus on weight loss. · Consider joining a support group for people who are trying to lose weight. Your doctor can suggest groups in your area. Where can you learn more? Go to http://dori-dipak.info/. Enter L818 in the search box to learn more about \"Starting a Weight Loss Plan: Care Instructions. \" Current as of: October 13, 2016 Content Version: 11.4 © 8173-3203 MathZee. Care instructions adapted under license by [employer] (which disclaims liability or warranty for this information). If you have questions about a medical condition or this instruction, always ask your healthcare professional. [address] any warranty or liability for your use of this information. Introducing [facility] & HEALTH SERVICES! Dear Marjorie Barba: Thank you for requesting a [facility] account. Our records indicate that you already have an active [facility] account. You can access your account anytime at [email or web address] Did you know that you can access your hospital and ER discharge instructions at any time in [facility]? You can also review all of your test results from your hospital stay or ER visit. Additional Information If you have questions, please visit the Frequently Asked Questions section of the Parallax Enterprisest website at [email or web address] Remember, Pruffi is NOT to be used for urgent needs. For medical emergencies, dial 911. Now available from your iPhone and Android! Please provide this summary of care documentation to your next provider. Your primary care clinician is listed as Ferrell Soulier. If you have any questions after today's visit, please call [phone].

## 2017-10-28 LAB
ALBUMIN SERPL-MCNC: 4.2 G/DL (ref 3.5–5.5)
ALBUMIN/GLOB SERPL: 1.8 {RATIO} (ref 1.2–2.2)
ALP SERPL-CCNC: 99 IU/L (ref 39–117)
ALT SERPL-CCNC: 50 IU/L (ref 0–32)
AST SERPL-CCNC: 25 IU/L (ref 0–40)
BILIRUB SERPL-MCNC: 0.4 MG/DL (ref 0–1.2)
BUN SERPL-MCNC: 15 MG/DL (ref 6–24)
BUN/CREAT SERPL: 18 (ref 9–23)
CALCIUM SERPL-MCNC: 8.9 MG/DL (ref 8.7–10.2)
CHLORIDE SERPL-SCNC: 102 MMOL/L (ref 96–106)
CHOLEST SERPL-MCNC: 193 MG/DL (ref 100–199)
CO2 SERPL-SCNC: 32 MMOL/L (ref 18–29)
CREAT SERPL-MCNC: 0.84 MG/DL (ref 0.57–1)
GFR SERPLBLD CREATININE-BSD FMLA CKD-EPI: 77 ML/MIN/1.73
GFR SERPLBLD CREATININE-BSD FMLA CKD-EPI: 89 ML/MIN/1.73
GLOBULIN SER CALC-MCNC: 2.3 G/DL (ref 1.5–4.5)
GLUCOSE SERPL-MCNC: 99 MG/DL (ref 65–99)
HDLC SERPL-MCNC: 40 MG/DL
INTERPRETATION, 910389: NORMAL
LDLC SERPL CALC-MCNC: 134 MG/DL (ref 0–99)
POTASSIUM SERPL-SCNC: 4.2 MMOL/L (ref 3.5–5.2)
PROT SERPL-MCNC: 6.5 G/DL (ref 6–8.5)
SODIUM SERPL-SCNC: 143 MMOL/L (ref 134–144)
TRIGL SERPL-MCNC: 94 MG/DL (ref 0–149)
TSH SERPL DL<=0.005 MIU/L-ACNC: 2.19 UIU/ML (ref 0.45–4.5)
VLDLC SERPL CALC-MCNC: 19 MG/DL (ref 5–40)

## 2018-04-19 ENCOUNTER — OFFICE VISIT (OUTPATIENT)
Dept: INTERNAL MEDICINE CLINIC | Age: 58
End: 2018-04-19

## 2018-04-19 VITALS
HEART RATE: 102 BPM | OXYGEN SATURATION: 96 % | SYSTOLIC BLOOD PRESSURE: 140 MMHG | RESPIRATION RATE: 18 BRPM | HEIGHT: 61 IN | TEMPERATURE: 98.2 F | BODY MASS INDEX: 50.73 KG/M2 | WEIGHT: 268.7 LBS | DIASTOLIC BLOOD PRESSURE: 88 MMHG

## 2018-04-19 DIAGNOSIS — E78.00 PURE HYPERCHOLESTEROLEMIA: ICD-10-CM

## 2018-04-19 DIAGNOSIS — K80.20 SYMPTOMATIC CHOLELITHIASIS: ICD-10-CM

## 2018-04-19 DIAGNOSIS — E03.9 HYPOTHYROIDISM, UNSPECIFIED TYPE: ICD-10-CM

## 2018-04-19 DIAGNOSIS — E03.9 ACQUIRED HYPOTHYROIDISM: ICD-10-CM

## 2018-04-19 DIAGNOSIS — E66.01 OBESITY, MORBID (HCC): Primary | ICD-10-CM

## 2018-04-19 DIAGNOSIS — Z00.00 WELL ADULT EXAM: ICD-10-CM

## 2018-04-19 RX ORDER — LEVOTHYROXINE SODIUM 25 UG/1
25 TABLET ORAL
Qty: 90 TAB | Refills: 1 | Status: SHIPPED | OUTPATIENT
Start: 2018-04-19 | End: 2019-05-29 | Stop reason: SDUPTHER

## 2018-04-19 NOTE — MR AVS SNAPSHOT
[address] [facility], [address] [place] [address] 
[phone] Patient: Omer Torres MRN:  [id number] Visit Information Date & Time Provider Department Dept. Phone Encounter #  
 4/19/2018  3:15 PM MD [facility] [phone] Follow-up Instructions Return in about 4 months (around 8/19/2018) for Regular follow up. Upcoming Health Maintenance Date Due DTaP/Tdap/Td series (1 - Tdap) 11/24/2010 BREAST CANCER SCRN MAMMOGRAM 5/12/2017 COLONOSCOPY 9/20/2017 PAP AKA CERVICAL CYTOLOGY 5/12/2018 MEDICARE YEARLY EXAM 6/28/2018 Allergies as of 4/19/2018  Review Complete On: 4/19/2018 By: Anirudh Encinas MD  
  
 Severity Noted Reaction Type Reactions Flagyl [Metronidazole]  05/07/2010   Intolerance Other (comments)  
 ams Current Immunizations  Never Reviewed Name Date  
 TB Skin Test (PPD) 11/1/2013 TD Vaccine 11/23/2010 11:09 PM  
  
 Not reviewed this visit You Were Diagnosed With   
  
 Codes Comments Obesity, morbid ([facility] [address]    -  Primary ICD-10-CM: E66.01 
ICD-9-CM: 278.01   
 Pure hypercholesterolemia     ICD-10-CM: E78.00 ICD-9-CM: 272.0 Hypothyroidism, unspecified type     ICD-10-CM: E03.9 ICD-9-CM: 573. 9 Symptomatic cholelithiasis     ICD-10-CM: K80.20 ICD-9-CM: 574.20 Well adult exam     ICD-10-CM: Z00.00 ICD-9-CM: V70.0 Vitals BP Pulse Temp Resp Height(growth percentile) Weight(growth percentile) 140/88 (BP 1 Location: Left arm, BP Patient Position: Sitting) (!) 102 98.2 °F (36.8 °C) (Oral) 18 5' 1\" (1.549 m) 268 lb 11.2 oz (121.9 kg) SpO2 BMI OB Status Smoking Status 96% 50.77 kg/m2 Postmenopausal Never Smoker Vitals History BMI and BSA Data Body Mass Index Body Surface Area 50.77 kg/m 2 2.29 m 2 Preferred Pharmacy Pharmacy Name Phone [facility], [address] [facility] [address] [phone] Your Updated Medication List  
  
   
This list is accurate as of 4/19/18  3:37 PM.  Always use your most recent med list.  
  
  
  
  
 cpap machine kit  
by Does Not Apply route. Dx 327.23  
  
 * EFFEXOR  mg capsule Generic drug:  venlafaxine-SR Take  by mouth daily. * venlafaxine 75 mg tablet Commonly known as:  [facility] Take 25 mg by mouth three (3) times daily. LaMICtal 100 mg tablet Generic drug:  lamoTRIgine Take 200 mg by mouth nightly. levothyroxine 25 mcg tablet Commonly known as:  SYNTHROID Take 1 Tab by mouth Daily (before breakfast). risperiDONE 3 mg tablet Commonly known as:  RisperDAL Take 3 mg by mouth daily. TRAZODONE PO Take 25-50 mg by mouth nightly as needed. * Notice: This list has 2 medication(s) that are the same as other medications prescribed for you. Read the directions carefully, and ask your doctor or other care provider to review them with you. Follow-up Instructions Return in about 4 months (around 8/19/2018) for Regular follow up. To-Do List   
 04/20/2018 Lab:  CBC WITH AUTOMATED DIFF   
  
 04/20/2018 Lab:  LIPID PANEL   
  
 04/20/2018 Lab:  METABOLIC PANEL, COMPREHENSIVE   
  
 04/20/2018 Lab:  TSH REFLEX TO T4   
  
 04/20/2018 Lab:  URINALYSIS W/ RFLX MICROSCOPIC Patient Instructions Follow a low glycemic index diet. See a nutritionist. 
Have mammogram done. Have colonoscopy done. Have fasting blood work analysis. Introducing [facility] HE[facility] SERVICES! Dear Chaparro Edwards: Thank you for requesting a [facility] account. Our records indicate that you already have an active [facility] account. You can access your account anytime at [email or web address] Did you know that you can access your hospital and ER discharge instructions at any time in [employer]? You can also review all of your test results from your hospital stay or ER visit. Additional Information If you have questions, please visit the Frequently Asked Questions section of the [employer] website at [email or web address] Remember, [employer] is NOT to be used for urgent needs. For medical emergencies, dial 911. Now available from your iPhone and Android! Please provide this summary of care documentation to your next provider. Your primary care clinician is listed as Anirudh Encinas. If you have any questions after today's visit, please call [phone].

## 2018-04-19 NOTE — PROGRESS NOTES
Subjective:     Chief Complaint   Patient presents with    Advice Only     discuss gallbladder surgery, knee replacement,      She  is a 61y.o. year old female who presents for evaluation. Got a call about getting colonoscopy to be scheduled. Talked to surgeon about gallbladder and has been putting off surgery. Wants to see nutritionist.  Stan Lucason to drinking diet soda. Needs knee surgery but has to lose weight to be ideal candidate. Needs application for [facility] to provide special transportation. Historical Data    Past Medical History:   Diagnosis Date    Anxiety and depression     [facility]    Bipolar Disorder     [facility]    DDD (degenerative disc disease), lumbosacral     L5-S1 ([facility])    Depression     Foot fracture, right     h/o    Genital herpes     GERD (gastroesophageal reflux disease)     History of gastritis     Dr. Angelita Solano Hyperlipidemia     Hypothyroidism (acquired)     OA (osteoarthritis)     knees, Dr. Neftaly Durant    Obesity     NIKOLE on CPAP 2007    adherent with CPAP use    Symptomatic cholelithiasis 10/18/2017        Past Surgical History:   Procedure Laterality Date    HX COLONOSCOPY  9/20/12    colon polyp, repeat in 5 yrs, Dr. Tere Mondragon HX ENDOSCOPY  5/19/16    gastritis, hiatal hernia (Dr. Gia Joy)    HX OTHER SURGICAL      cyst removed from scalp - benign        Outpatient Encounter Prescriptions as of 4/19/2018   Medication Sig Dispense Refill    venlafaxine (EFFEXOR) 75 mg tablet Take 25 mg by mouth three (3) times daily.  levothyroxine (SYNTHROID) 25 mcg tablet Take 1 Tab by mouth Daily (before breakfast). 90 Tab 1    venlafaxine-SR (EFFEXOR XR) 150 mg capsule Take  by mouth daily.  risperiDONE (RISPERDAL) 3 mg tablet Take 3 mg by mouth daily.  cpap machine kit by Does Not Apply route. Dx 327.23 1 Kit 0    TRAZODONE HCL (TRAZODONE PO) Take 25-50 mg by mouth nightly as needed.         lamoTRIgine (LAMICTAL) 100 mg tablet Take 200 mg by mouth nightly. No facility-administered encounter medications on file as of 4/19/2018. Allergies   Allergen Reactions    Flagyl [Metronidazole] Other (comments)     ams        Social History     Social History    Marital status: SINGLE     Spouse name: N/A    Number of children: N/A    Years of education: N/A     Occupational History    Not on file. Social History Main Topics    Smoking status: Never Smoker    Smokeless tobacco: Never Used    Alcohol use No    Drug use: No      Comment: prescriptions    Sexual activity: No     Other Topics Concern    Not on file     Social History Narrative        Review of Systems  A comprehensive review of systems was negative except for that written in the HPI. Objective:     Vitals:    04/19/18 1508   BP: 140/88   Pulse: (!) 102   Resp: 18   Temp: 98.2 °F (36.8 °C)   TempSrc: Oral   SpO2: 96%   Weight: 268 lb 11.2 oz (121.9 kg)   Height: 5' 1\" (1.549 m)     Pleasant WF in no acute distress. Neck: Supple. Cardiac: RRR without murmurs, gallops or rubs. Lungs: Clear to auscultation. Abdomen: Benign  Neuro: Intact. Uses walker due to Orthopedic limitations. ASSESSMENT / PLAN:   1. Obesity, morbid ([address]  I have reviewed/discussed the above normal BMI with the patient. I have recommended the following interventions: dietary management education, guidance, and counseling . Patricia Peaks 2. Pure hypercholesterolemia  · Continue dietary efforts.  - LIPID PANEL; Future    3. Hypothyroidism, unspecified type  · Continue levothyroxine  - TSH REFLEX TO T4; Future    4. Symptomatic cholelithiasis  · She is still deliberating about surgery    5. Well adult exam    - CBC WITH AUTOMATED DIFF; Future  - LIPID PANEL; Future  - TSH REFLEX TO T4; Future  - URINALYSIS W/ RFLX MICROSCOPIC; Future  - METABOLIC PANEL, COMPREHENSIVE; Future    6. Acquired hypothyroidism    - levothyroxine (SYNTHROID) 25 mcg tablet;  Take 1 Tab by mouth Daily (before breakfast). Dispense: 90 Tab; Refill: 1    Patient Instructions   Follow a low glycemic index diet. See a nutritionist.  Have mammogram done. Have colonoscopy done. Have fasting blood work analysis. Follow-up Disposition:  Return in about 4 months (around 8/19/2018) for Regular follow up. Advised her to call back or return to office if symptoms worsen/change/persist.  Discussed expected course/resolution/complications of diagnosis in detail with patient. Medication risks/benefits/costs/interactions/alternatives discussed with patient. She was given an after visit summary which includes diagnoses, current medications, & vitals. She expressed understanding with the diagnosis and plan.

## 2018-04-19 NOTE — PATIENT INSTRUCTIONS
Follow a low glycemic index diet. See a nutritionist.  Have mammogram done. Have colonoscopy done. Have fasting blood work analysis.

## 2018-04-19 NOTE — PROGRESS NOTES
Chief Complaint   Patient presents with    Advice Only     discuss gallbladder surgery, knee replacement,      Reviewed record in preparation for visit and have obtained necessary documentation. Identified pt with two pt identifiers(name and ).       Health Maintenance Due   Topic    DTaP/Tdap/Td series (1 - Tdap)    BREAST CANCER SCRN MAMMOGRAM     COLONOSCOPY     PAP AKA CERVICAL CYTOLOGY          Chief Complaint   Patient presents with    Advice Only     discuss gallbladder surgery, knee replacement,         Wt Readings from Last 3 Encounters:   18 268 lb 11.2 oz (121.9 kg)   10/27/17 269 lb 4.8 oz (122.2 kg)   10/18/17 274 lb (124.3 kg)     Temp Readings from Last 3 Encounters:   18 98.2 °F (36.8 °C) (Oral)   10/27/17 98.4 °F (36.9 °C) (Oral)   10/18/17 98 °F (36.7 °C) (Oral)     BP Readings from Last 3 Encounters:   18 140/88   10/27/17 120/82   17 130/80     Pulse Readings from Last 3 Encounters:   18 (!) 102   10/27/17 87   10/18/17 (!) 107           Learning Assessment:  :     Learning Assessment 10/18/2017 2014   PRIMARY LEARNER Patient Patient   HIGHEST LEVEL OF EDUCATION - PRIMARY LEARNER  > 4 YEARS OF COLLEGE > 4 YEARS OF COLLEGE   BARRIERS PRIMARY LEARNER NONE OTHER   CO-LEARNER CAREGIVER Yes No   CO-LEARNER NAME mark Winston -   CO-LEARNER HIGHEST LEVEL OF EDUCATION 4 YEARS OF COLLEGE -   1 Paramjit Hernandez -   PRIMARY LANGUAGE ENGLISH ENGLISH   PRIMARY LANGUAGE CO-LEARNER ENGLISH -    NEED No No   LEARNER PREFERENCE PRIMARY DEMONSTRATION DEMONSTRATION     - LISTENING   LEARNING SPECIAL TOPICS - no   ANSWERED BY self patient   RELATIONSHIP SELF SELF   ASSESSMENT COMMENT none n/a       Depression Screening:  :     PHQ over the last two weeks 2017   Little interest or pleasure in doing things Not at all   Feeling down, depressed or hopeless Not at all   Total Score PHQ 2 0       Fall Risk Assessment:  :     Fall Risk Assessment, last 12 mths 6/27/2017   Able to walk? Yes   Fall in past 12 months? No       Abuse Screening:  :     Abuse Screening Questionnaire 6/27/2017 8/6/2014   Do you ever feel afraid of your partner? N N   Are you in a relationship with someone who physically or mentally threatens you? N N   Is it safe for you to go home? Y Y       Coordination of Care Questionnaire:  :     1) Have you been to an emergency room, urgent care clinic since your last visit? no   Hospitalized since your last visit? no             2) Have you seen or consulted any other health care providers outside of [address] [place] [address] since your last visit? yes  (Include any pap smears or colon screenings in this section.)    3) Do you have an Advance Directive on file? yes    4) Are you interested in receiving information on Advance Directives? NO      Patient is accompanied by self I have received verbal consent from Lázaro Connolly to discuss any/all medical information while they are present in the room. Reviewed record  In preparation for visit and have obtained necessary documentation.

## 2018-05-03 ENCOUNTER — OFFICE VISIT (OUTPATIENT)
Dept: INTERNAL MEDICINE CLINIC | Age: 58
End: 2018-05-03

## 2018-05-03 VITALS
SYSTOLIC BLOOD PRESSURE: 160 MMHG | RESPIRATION RATE: 16 BRPM | BODY MASS INDEX: 49.96 KG/M2 | OXYGEN SATURATION: 96 % | DIASTOLIC BLOOD PRESSURE: 90 MMHG | TEMPERATURE: 96.8 F | WEIGHT: 264.6 LBS | HEART RATE: 92 BPM | HEIGHT: 61 IN

## 2018-05-03 DIAGNOSIS — R35.0 FREQUENCY OF URINATION: Primary | ICD-10-CM

## 2018-05-03 DIAGNOSIS — N30.01 ACUTE CYSTITIS WITH HEMATURIA: ICD-10-CM

## 2018-05-03 LAB
BILIRUB UR QL STRIP: NORMAL
GLUCOSE UR-MCNC: NORMAL MG/DL
KETONES P FAST UR STRIP-MCNC: NORMAL MG/DL
PH UR STRIP: 5 [PH] (ref 4.6–8)
PROT UR QL STRIP: NORMAL
SP GR UR STRIP: 1.01 (ref 1–1.03)
UA UROBILINOGEN AMB POC: NORMAL (ref 0.2–1)
URINALYSIS CLARITY POC: CLEAR
URINALYSIS COLOR POC: NORMAL
URINE BLOOD POC: NORMAL
URINE LEUKOCYTES POC: NORMAL
URINE NITRITES POC: POSITIVE

## 2018-05-03 RX ORDER — SULFAMETHOXAZOLE AND TRIMETHOPRIM 800; 160 MG/1; MG/1
1 TABLET ORAL 2 TIMES DAILY
Qty: 6 TAB | Refills: 0 | Status: SHIPPED | OUTPATIENT
Start: 2018-05-03 | End: 2018-05-06

## 2018-05-03 NOTE — PROGRESS NOTES
Chief Complaint   Patient presents with    Urinary Frequency     pain (started yesterday)     Reviewed record in preparation for visit and have obtained necessary documentation. Identified pt with two pt identifiers(name and ). Health Maintenance Due   Topic    PAP AKA CERVICAL CYTOLOGY          Chief Complaint   Patient presents with    Urinary Frequency     pain (started yesterday)        Wt Readings from Last 3 Encounters:   18 264 lb 9.6 oz (120 kg)   18 268 lb 11.2 oz (121.9 kg)   10/27/17 269 lb 4.8 oz (122.2 kg)     Temp Readings from Last 3 Encounters:   18 96.8 °F (36 °C) (Oral)   18 98.2 °F (36.8 °C) (Oral)   10/27/17 98.4 °F (36.9 °C) (Oral)     BP Readings from Last 3 Encounters:   18 160/90   18 140/88   10/27/17 120/82     Pulse Readings from Last 3 Encounters:   18 92   18 (!) 102   10/27/17 87           Learning Assessment:  :     Learning Assessment 10/18/2017 2014   PRIMARY LEARNER Patient Patient   HIGHEST LEVEL OF EDUCATION - PRIMARY LEARNER  > 4 YEARS OF COLLEGE > 4 YEARS OF COLLEGE   BARRIERS PRIMARY LEARNER NONE OTHER   CO-LEARNER CAREGIVER Yes No   CO-LEARNER NAME mark Winston -   CO-LEARNER HIGHEST LEVEL OF EDUCATION 4 YEARS OF COLLEGE -   1 Paramjit Hernandez -   PRIMARY LANGUAGE ENGLISH ENGLISH   PRIMARY LANGUAGE CO-LEARNER ENGLISH -    NEED No No   LEARNER PREFERENCE PRIMARY DEMONSTRATION DEMONSTRATION     - LISTENING   LEARNING SPECIAL TOPICS - no   ANSWERED BY self patient   RELATIONSHIP SELF SELF   ASSESSMENT COMMENT none n/a       Depression Screening:  :     PHQ over the last two weeks 2017   Little interest or pleasure in doing things Not at all   Feeling down, depressed or hopeless Not at all   Total Score PHQ 2 0       Fall Risk Assessment:  :     Fall Risk Assessment, last 12 mths 2017   Able to walk? Yes   Fall in past 12 months?  No       Abuse Screening:  :     Abuse Screening Questionnaire 6/27/2017 8/6/2014   Do you ever feel afraid of your partner? N N   Are you in a relationship with someone who physically or mentally threatens you? N N   Is it safe for you to go home? Y Y       Coordination of Care Questionnaire:  :     1) Have you been to an emergency room, urgent care clinic since your last visit? no   Hospitalized since your last visit? no             2) Have you seen or consulted any other health care providers outside of [facility] since your last visit? no  (Include any pap smears or colon screenings in this section.)    3) Do you have an Advance Directive on file? yes    4) Are you interested in receiving information on Advance Directives? NO      Patient is accompanied by self I have received verbal consent from Say Deutsch to discuss any/all medical information while they are present in the room. Reviewed record  In preparation for visit and have obtained necessary documentation.

## 2018-05-03 NOTE — PROGRESS NOTES
Subjective:     Chief Complaint   Patient presents with    Urinary Frequency     pain (started yesterday) rm 8     She  is a 61y.o. year old female who presents for evaluation. Has pain with urination that started yesterday. Some blood in urine and has some back pain. No fever. Historical Data    Past Medical History:   Diagnosis Date    Anxiety and depression     [facility]    Bipolar Disorder     [facility]    DDD (degenerative disc disease), lumbosacral     L5-S1 ([facility])    Depression     Foot fracture, right     h/o    Genital herpes     GERD (gastroesophageal reflux disease)     History of gastritis     Dr. Ghislaine Peterson Hyperlipidemia     Hypothyroidism (acquired)     OA (osteoarthritis)     knees, Dr. Brenton Dela Cruz    Obesity     NIKOLE on CPAP 2007    adherent with CPAP use    Symptomatic cholelithiasis 10/18/2017        Past Surgical History:   Procedure Laterality Date    HX COLONOSCOPY  9/20/12    colon polyp, repeat in 5 yrs, Dr. Daniel Rice HX ENDOSCOPY  5/19/16    gastritis, hiatal hernia (Dr. Marc Botello)    HX OTHER SURGICAL      cyst removed from scalp - benign        Outpatient Encounter Prescriptions as of 5/3/2018   Medication Sig Dispense Refill    levothyroxine (SYNTHROID) 25 mcg tablet Take 1 Tab by mouth Daily (before breakfast). 90 Tab 1    venlafaxine (EFFEXOR) 75 mg tablet Take 25 mg by mouth three (3) times daily.  venlafaxine-SR (EFFEXOR XR) 150 mg capsule Take  by mouth daily.  risperiDONE (RISPERDAL) 3 mg tablet Take 3 mg by mouth daily.  TRAZODONE HCL (TRAZODONE PO) Take 25-50 mg by mouth nightly as needed.  lamoTRIgine (LAMICTAL) 100 mg tablet Take 200 mg by mouth nightly.  cpap machine kit by Does Not Apply route. Dx 327.23 1 Kit 0     No facility-administered encounter medications on file as of 5/3/2018.          Allergies   Allergen Reactions    Flagyl [Metronidazole] Other (comments)     ams Social History     Social History    Marital status: SINGLE     Spouse name: N/A    Number of children: N/A    Years of education: N/A     Occupational History    Not on file. Social History Main Topics    Smoking status: Never Smoker    Smokeless tobacco: Never Used    Alcohol use No    Drug use: No      Comment: prescriptions    Sexual activity: No     Other Topics Concern    Not on file     Social History Narrative        Review of Systems  Pertinent items are noted in HPI. Objective:     Vitals:    05/03/18 0932   BP: 160/90   Pulse: 92   Resp: 16   Temp: 96.8 °F (36 °C)   TempSrc: Oral   SpO2: 96%   Weight: 264 lb 9.6 oz (120 kg)   Height: 5' 1\" (1.549 m)     Pleasant WF. Back: No CVA tenderness. Results for orders placed or performed in visit on 05/03/18   AMB POC URINALYSIS DIP STICK AUTO W/O MICRO   Result Value Ref Range    Color (UA POC) Red     Clarity (UA POC) Clear     Glucose (UA POC) 1+ Negative    Bilirubin (UA POC) 1+ Negative    Ketones (UA POC) Trace Negative    Specific gravity (UA POC) 1.010 1.001 - 1.035    Blood (UA POC) 3+ Negative    pH (UA POC) 5.0 4.6 - 8.0    Protein (UA POC) 3+ Negative    Urobilinogen (UA POC) 4 mg/dL 0.2 - 1    Nitrites (UA POC) Positive Negative    Leukocyte esterase (UA POC) 3+ Negative         ASSESSMENT / PLAN:   1. Frequency of urination    - AMB POC URINALYSIS DIP STICK AUTO W/O MICRO  - trimethoprim-sulfamethoxazole (BACTRIM DS, SEPTRA DS) 160-800 mg per tablet; Take 1 Tab by mouth two (2) times a day for 3 days. Dispense: 6 Tab; Refill: 0    2. Acute cystitis with hematuria  · Anticipatory guidance. · Bactrim DS for three days. - trimethoprim-sulfamethoxazole (BACTRIM DS, SEPTRA DS) 160-800 mg per tablet; Take 1 Tab by mouth two (2) times a day for 3 days. Dispense: 6 Tab; Refill: 0    Patient Instructions   Drink plenty of fluid. Take Bactrim DS twice a day for three days.            Follow-up Disposition:  Return if symptoms worsen or fail to improve. Advised her to call back or return to office if symptoms worsen/change/persist.  Discussed expected course/resolution/complications of diagnosis in detail with patient. Medication risks/benefits/costs/interactions/alternatives discussed with patient. She was given an after visit summary which includes diagnoses, current medications, & vitals. She expressed understanding with the diagnosis and plan.

## 2018-05-03 NOTE — MR AVS SNAPSHOT
[address], [address] [place] [address] 
[phone] Patient: Vignesh Mitchell MRN:  [id number] Visit Information Date & Time Provider Department Dept. Phone Encounter #  
 5/3/2018  9:45 AM Henry Grewal MD [place] [id number] [facility] [phone] [id number] Follow-up Instructions Return if symptoms worsen or fail to improve. Upcoming Health Maintenance Date Due  
 PAP AKA CERVICAL CYTOLOGY 5/12/2018 DTaP/Tdap/Td series (1 - Tdap) 6/29/2018* BREAST CANCER SCRN MAMMOGRAM 7/27/2018* COLONOSCOPY 7/27/2018* MEDICARE YEARLY EXAM 6/28/2018 Influenza Age 5 to Adult 8/1/2018 *Topic was postponed. The date shown is not the original due date. Allergies as of 5/3/2018  Review Complete On: 5/3/2018 By: Henry Grewal MD  
  
 Severity Noted Reaction Type Reactions Flagyl [Metronidazole]  05/07/2010   Intolerance Other (comments)  
 ams Current Immunizations  Never Reviewed Name Date  
 TB Skin Test (PPD) 11/1/2013 TD Vaccine 11/23/2010 11:09 PM  
  
 Not reviewed this visit You Were Diagnosed With   
  
 Codes Comments Frequency of urination    -  Primary ICD-10-CM: R35.0 ICD-9-CM: 788.41 Acute cystitis with hematuria     ICD-10-CM: N30.01 
ICD-9-CM: 595.0 Vitals BP Pulse Temp Resp Height(growth percentile) Weight(growth percentile) 160/90 (BP 1 Location: Left arm, BP Patient Position: Sitting) 92 96.8 °F (36 °C) (Oral) 16 5' 1\" (1.549 m) 264 lb 9.6 oz (120 kg) SpO2 BMI OB Status Smoking Status 96% 50 kg/m2 Postmenopausal Never Smoker BMI and BSA Data Body Mass Index Body Surface Area 50 kg/m 2 2.27 m 2 Preferred Pharmacy Pharmacy Name Phone [facility] [facility] [address] [phone] Your Updated Medication List  
  
   
 This list is accurate as of 5/3/18  9:55 AM.  Always use your most recent med list.  
  
  
  
  
 cpap machine kit  
by Does Not Apply route. Dx 327.23  
  
 * EFFEXOR  mg capsule Generic drug:  venlafaxine-SR Take  by mouth daily. * venlafaxine 75 mg tablet Commonly known as:  [facility] Take 25 mg by mouth three (3) times daily. LaMICtal 100 mg tablet Generic drug:  lamoTRIgine Take 200 mg by mouth nightly. levothyroxine 25 mcg tablet Commonly known as:  SYNTHROID Take 1 Tab by mouth Daily (before breakfast). risperiDONE 3 mg tablet Commonly known as:  RisperDAL Take 3 mg by mouth daily. TRAZODONE PO Take 25-50 mg by mouth nightly as needed. trimethoprim-sulfamethoxazole 160-800 mg per tablet Commonly known as:  BACTRIM DS, SEPTRA DS Take 1 Tab by mouth two (2) times a day for 3 days. * Notice: This list has 2 medication(s) that are the same as other medications prescribed for you. Read the directions carefully, and ask your doctor or other care provider to review them with you. Prescriptions Printed Refills  
 trimethoprim-sulfamethoxazole (BACTRIM DS, SEPTRA DS) 160-800 mg per tablet 0 Sig: Take 1 Tab by mouth two (2) times a day for 3 days. Class: Print Route: Oral  
  
We Performed the Following AMB POC URINALYSIS DIP STICK AUTO W/O MICRO [93066 CPT(R)] Follow-up Instructions Return if symptoms worsen or fail to improve. Patient Instructions Drink plenty of fluid. Take Bactrim DS twice a day for three days. Introducing [facility]! Dear Maye Forbes: Thank you for requesting a [employer] account. Our records indicate that you already have an active [employer] account. You can access your account anytime at [email or web address] Did you know that you can access your hospital and ER discharge instructions at any time in [employer]?   You can also review all of your test results from your hospital stay or ER visit. Additional Information If you have questions, please visit the Frequently Asked Questions section of the [employer] website at [email or web address] "[email or web address] [email or web address] Remember, [employer] is NOT to be used for urgent needs. For medical emergencies, dial 911. Now available from your iPhone and Android! Please provide this summary of care documentation to your next provider. Your primary care clinician is listed as Cleveland York. If you have any questions after today's visit, please call [phone].

## 2018-05-03 NOTE — LETTER
NOTIFICATION RETURN TO WORK / SCHOOL 
 
5/3/2018 9:56 AM 
 
 
Ms. Enio Devine [address] [facility] [address] [phone] To Whom It May Concern: 
 
 
Enio Devine is currently under the care of Ana María Nance. She will return to work/school on: 5/4/2018 Excuse from work 5/3/2018 If there are questions or concerns please have the patient contact our office. Sincerely, Yessi Ellis MD

## 2018-05-16 ENCOUNTER — HOSPITAL ENCOUNTER (OUTPATIENT)
Dept: LAB | Age: 58
Discharge: HOME | End: 2018-05-16
Payer: MEDICARE

## 2018-05-16 ENCOUNTER — TELEPHONE (OUTPATIENT)
Dept: INTERNAL MEDICINE CLINIC | Age: 58
End: 2018-05-16

## 2018-05-16 ENCOUNTER — CLINICAL SUPPORT (OUTPATIENT)
Dept: INTERNAL MEDICINE CLINIC | Age: 58
End: 2018-05-16

## 2018-05-16 DIAGNOSIS — R35.0 URINATION FREQUENCY: Primary | ICD-10-CM

## 2018-05-16 PROCEDURE — 87086 URINE CULTURE/COLONY COUNT: CPT

## 2018-05-16 PROCEDURE — 36415 COLL VENOUS BLD VENIPUNCTURE: CPT

## 2018-05-16 PROCEDURE — 81001 URINALYSIS AUTO W/SCOPE: CPT

## 2018-05-16 NOTE — TELEPHONE ENCOUNTER
No culture was collected at office visit.  Please advise if an office visit is needed or if patient may bring a urine sample by the office

## 2018-05-16 NOTE — TELEPHONE ENCOUNTER
Ask patient to come in for a urine culture so we can determine what antibiotic is best for her. Dr Gutierrez Daniels      Patient advised of above.  Put on schedule for nurse visit this afternoon

## 2018-05-16 NOTE — TELEPHONE ENCOUNTER
Pt said she saw dr Karie Hamilton on [id number] for a bladder infection and was given bactrim and it is not any better  Do you want to call in another prescription or you want her to try something else you can call it in to [facility] at [facility] [address] at [phone] pt call  Back # [phone]

## 2018-05-18 ENCOUNTER — DOCUMENTATION ONLY (OUTPATIENT)
Dept: INTERNAL MEDICINE CLINIC | Age: 58
End: 2018-05-18

## 2018-05-18 LAB
APPEARANCE UR: ABNORMAL
BACTERIA #/AREA URNS HPF: ABNORMAL /[HPF]
BACTERIA UR CULT: NORMAL
BILIRUB UR QL STRIP: NEGATIVE
CASTS URNS QL MICRO: ABNORMAL /LPF
COLOR UR: YELLOW
EPI CELLS #/AREA URNS HPF: ABNORMAL /HPF
GLUCOSE UR QL: NEGATIVE
HGB UR QL STRIP: ABNORMAL
KETONES UR QL STRIP: NEGATIVE
LEUKOCYTE ESTERASE UR QL STRIP: ABNORMAL
MICRO URNS: ABNORMAL
MUCOUS THREADS URNS QL MICRO: PRESENT
NITRITE UR QL STRIP: POSITIVE
PH UR STRIP: 6 [PH] (ref 5–7.5)
PROT UR QL STRIP: ABNORMAL
RBC #/AREA URNS HPF: >30 /HPF
SP GR UR: 1.02 (ref 1–1.03)
URINALYSIS REFLEX, 377202: ABNORMAL
UROBILINOGEN UR STRIP-MCNC: 1 MG/DL (ref 0.2–1)
WBC #/AREA URNS HPF: >30 /HPF

## 2018-06-29 ENCOUNTER — HOSPITAL ENCOUNTER (OUTPATIENT)
Dept: NUTRITION | Age: 58
Discharge: HOME | End: 2018-06-29
Attending: INTERNAL MEDICINE
Payer: MEDICARE

## 2018-06-29 DIAGNOSIS — E66.9 OBESITY: ICD-10-CM

## 2018-06-29 PROCEDURE — 97802 MEDICAL NUTRITION INDIV IN: CPT | Performed by: DIETITIAN, REGISTERED

## 2018-06-29 NOTE — PROGRESS NOTES
1211   Assessment - Initial Nutrition Evaluation   DATE: 2018      REFERRING PHYSICIAN: Dr. Jessica Paulson  NAME: Edvin Saez : 1960 AGE: 60 y.o. GENDER: female  REASON FOR VISIT: Obesity    ASSESSMENT:  Past Medical Hx: Anxiety/depression, bipolar, GERD, hypothyroidism, NIKOLE, hyperlipidemia,       LABS:   Lab Results   Component Value Date/Time    Hemoglobin A1c 5.4 10/21/2016 11:04 AM       MEDICATIONS/SUPPLEMENTS:   [unfilled]  Prior to Admission medications    Medication Sig Start Date End Date Taking? Authorizing Provider   levothyroxine (SYNTHROID) 25 mcg tablet Take 1 Tab by mouth Daily (before breakfast). 18   Jewell Del Rosario MD   venlafaxine [facility]) 75 mg tablet Take 25 mg by mouth three (3) times daily. Historical Provider   venlafaxine-SR (EFFEXOR XR) 150 mg capsule Take  by mouth daily. Historical Provider   risperiDONE (RISPERDAL) 3 mg tablet Take 3 mg by mouth daily. Historical Provider   cpap machine kit by Does Not Apply route. Dx 327.23 7/13/15   LEONOR Franco   TRAZODONE HCL (TRAZODONE PO) Take 25-50 mg by mouth nightly as needed. Historical Provider   lamoTRIgine (LAMICTAL) 100 mg tablet Take 200 mg by mouth nightly. Historical Provider       FOOD ALLERGIES/INTOLERANCES: None noted    ANTHROPOMETRICS:    Ht Readings from Last 1 Encounters:   18 5' 1\" (1.549 m)      Wt Readings from Last 1 Encounters:   18 120 kg (264 lb 9.6 oz)   Today's wt: 263 lbs (119.5 kg)   IBW: 105 # +/- 10%  %IBW: 250 % +/- 10%    BMI: 49.8 Category: Obesity Class III    Reported Wt Hx:  Pt reports wt <120 lbs until early 30's; crisis happened and pt started gaining wt    Estimated Nutritional Needs:   2118-5647 kcal/day (MSJ x 1.2-1.3 - 750); 96g protein (0.8g/kg)    Reported Diet Hx:  Pt eats 3 meals a day; sometimes snacks between meals. Notes large portions at meals/snacks.  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 Pt states working with dietitian in the past and found helpful with accountability. Reviewed plate method (1/4 lean protein, 1/4 carb, 1/2 non-starchy veggies) and healthy snack ideas. Pt states when planning meals ahead of time is able to make better food choices; tends to get stressed/overwhelmed if meals not planned and then eats out. Had pt plan meals and made grocery list during appointment; plans on planning the second week next weekend. Reviewed importance of snacks between meals, if hungry, to prevent overeating at next meal; reviewed healthy snacks. Pt plans to do chair exercises at home and will visit 's next week, hoping to resume swimming. Pt appears to be in the preparation stage of behavior change. PATIENT GOALS:  1. Use plate method for meals  - Plan Week 2 menus and shop accordingly  2. Pack healthy snacks when away from home  3. Reduce portion sizes to one portion      Specific tips and techniques to facilitate compliance with above recommendations were provided and discussed. Pt was strongly encourage to begin making necessary changes now, and re-visit the dietitian in two weeks.   If further details are desired please feel free to contact me at [phone]. This phone number was also provided to the patient for any further questions or concerns.             Rachelle Palomino RD

## 2018-10-01 ENCOUNTER — TELEPHONE (OUTPATIENT)
Dept: SURGERY | Age: 58
End: 2018-10-01

## 2018-10-01 NOTE — TELEPHONE ENCOUNTER
I returned patients call and left a message telling her she did not need another U/S just an office visit with Dr. Krystal Gonzalez. I left our number for her to call and make the appt.

## 2018-10-03 ENCOUNTER — OFFICE VISIT (OUTPATIENT)
Dept: SURGERY | Age: 58
End: 2018-10-03

## 2018-10-03 VITALS
BODY MASS INDEX: 49.84 KG/M2 | SYSTOLIC BLOOD PRESSURE: 150 MMHG | HEIGHT: 61 IN | WEIGHT: 264 LBS | HEART RATE: 94 BPM | DIASTOLIC BLOOD PRESSURE: 88 MMHG | OXYGEN SATURATION: 98 % | RESPIRATION RATE: 16 BRPM | TEMPERATURE: 98.2 F

## 2018-10-03 DIAGNOSIS — K80.20 SYMPTOMATIC CHOLELITHIASIS: Primary | ICD-10-CM

## 2018-10-03 NOTE — PROGRESS NOTES
1. Have you been to the ER, urgent care clinic since your last visit? Hospitalized since your last visit? No    2. Have you seen or consulted any other health care providers outside of the [address] [place] [address] since your last visit? Include any pap smears or colon screening. No     Patient states she has had '2 attacks in 2 weeks of upper abdminal pain, below breasts'.

## 2018-10-03 NOTE — PROGRESS NOTES
Subjective:        Isabela Granados is a 60 y.o.  female who presents to discuss gallbladder surgery. Since I saw her last year at this same time, she has several more attacks of pretty severe biliary colic. No change in the colour of her urine or stools and no episodes of pancreatitis. She denies experiencing any fevers or chills. She has decided that now is the time to have her cholecystectomy.     Chief Complaint   Patient presents with    Back Pain     upper back pain under shoulder blades-- U/S 7/20/17 shows gallstone in gallbladder neck       Patient Active Problem List    Diagnosis Date Noted    Obesity, morbid ([facility] [address] 04/19/2018    Symptomatic cholelithiasis 10/18/2017    DDD (degenerative disc disease), lumbosacral     OA (osteoarthritis)     GERD (gastroesophageal reflux disease) 08/06/2014    Vitamin D deficiency 09/24/2013    Hypothyroidism 02/11/2013    NIKOLE (obstructive sleep apnea) 05/07/2010    Bipolar I disorder, most recent episode depressed, severe without psychotic features ([facility] [address] 05/07/2010    Pure hypercholesterolemia 05/07/2010     Past Medical History:   Diagnosis Date    Anxiety and depression     [facility] Bipolar Disorder     [address]    DDD (degenerative disc disease), lumbosacral     L5-S1 ([facility])    Depression     Foot fracture, right     h/o    Genital herpes     GERD (gastroesophageal reflux disease)     History of gastritis     Dr. Milly Mercado Hyperlipidemia     Hypothyroidism (acquired)     OA (osteoarthritis)     knees, Dr. Brinda Sanchez    Obesity     NIKOLE on CPAP 2007    adherent with CPAP use    Symptomatic cholelithiasis 10/18/2017      Past Surgical History:   Procedure Laterality Date    HX COLONOSCOPY  9/20/12    colon polyp, repeat in 5 yrs, Dr. Eligio Enamorado HX ENDOSCOPY  5/19/16    gastritis, hiatal hernia (Dr. Karla Casey)    HX OTHER SURGICAL      cyst removed from scalp - benign      Social History   Substance Use Topics    Smoking status: Never Smoker    Smokeless tobacco: Never Used    Alcohol use No      Family History   Problem Relation Age of Onset    Heart Disease Mother     Lung Disease Father     Diabetes Father     Heart Disease Father     COPD Father     Seizures Brother     Cancer Maternal Grandmother      OVARIAN, COLON    Diabetes Paternal Uncle     Diabetes Paternal Grandfather     Colon Cancer Other      materal grandmothers siblings x 3-4  of colon cancer    Celiac Disease Brother     Heart Disease Maternal Grandfather       Prior to Admission medications    Medication Sig Start Date End Date Taking? Authorizing Provider   levothyroxine (SYNTHROID) 25 mcg tablet Take 1 Tab by mouth Daily (before breakfast). 18  Yes Carlo Medina MD   venlafaxine [facility]) 75 mg tablet Take 25 mg by mouth three (3) times daily. Yes Historical Provider   venlafaxine-SR (EFFEXOR XR) 150 mg capsule Take  by mouth daily. Yes Historical Provider   risperiDONE (RISPERDAL) 3 mg tablet Take 3 mg by mouth daily. Yes Historical Provider   cpap machine kit by Does Not Apply route. Dx 327.23 7/13/15  Yes LEONOR Chapman   TRAZODONE HCL (TRAZODONE PO) Take 25-50 mg by mouth nightly as needed. Yes Historical Provider   lamoTRIgine (LAMICTAL) 100 mg tablet Take 200 mg by mouth nightly. Yes Historical Provider     Allergies   Allergen Reactions    Flagyl [Metronidazole] Other (comments)     ams         Review of Systems:    A comprehensive review of systems was negative except for that written in the History of Present Illness.     Objective:     Visit Vitals    /88 (BP 1 Location: Left arm, BP Patient Position: Sitting)    Pulse 94    Temp 98.2 °F (36.8 °C) (Oral)    Resp 16    Ht 5' 1\" (1.549 m)    Wt 264 lb (119.7 kg)    SpO2 98%    BMI 49.88 kg/m2       Physical Exam:  General appearance: alert, cooperative, no distress, appears stated age  Head: Normocephalic, without obvious abnormality, atraumatic  Lungs: clear to auscultation bilaterally  Heart: regular rate and rhythm, S1, S2 normal, no murmur, click, rub or gallop  Lymph nodes: Cervical, supraclavicular, and axillary nodes normal.  Neurologic: Grossly normal  Abdomen: Soft, no tenderness, no organomegaly, no masses. Assessment:       ICD-10-CM ICD-9-CM    1. Symptomatic cholelithiasis K80.20 574.20        Plan: Will do a laparoscopic cholecystectomy as an outpatient. Written by kandy Celestin, as dictated by Torrance Salinas Duane Pila, MD.    Total face to face time with patient: 10 minutes. Greater than 50% of the time was spent in counseling. Signed By: Torrance Salinas Duane Pila, MD    October 3, 2018

## 2018-10-03 NOTE — H&P (VIEW-ONLY)
Subjective:  
  
 
Aury Moraes is a 60 y.o.  female who presents to discuss gallbladder surgery. Since I saw her last year at this same time, she has several more attacks of pretty severe biliary colic. No change in the colour of her urine or stools and no episodes of pancreatitis. She denies experiencing any fevers or chills. She has decided that now is the time to have her cholecystectomy. Chief Complaint Patient presents with  Back Pain  
  upper back pain under shoulder blades-- U/S 7/20/17 shows gallstone in gallbladder neck Patient Active Problem List  
 Diagnosis Date Noted  Obesity, morbid ([facility] [address] 04/19/2018  Symptomatic cholelithiasis 10/18/2017  DDD (degenerative disc disease), lumbosacral   
 OA (osteoarthritis)  GERD (gastroesophageal reflux disease) 08/06/2014  Vitamin D deficiency 09/24/2013  Hypothyroidism 02/11/2013  NIKOLE (obstructive sleep apnea) 05/07/2010  Bipolar I disorder, most recent episode depressed, severe without psychotic features ([facility] [address] 05/07/2010  Pure hypercholesterolemia 05/07/2010 Past Medical History:  
Diagnosis Date  Anxiety and depression [employer]  Bipolar Disorder [employer]  DDD (degenerative disc disease), lumbosacral   
 L5-S1 ([facility])  Depression  Foot fracture, right   
 h/o  Genital herpes  GERD (gastroesophageal reflux disease)  History of gastritis Dr. Vinod Naranjo  Hyperlipidemia  Hypothyroidism (acquired)  OA (osteoarthritis)   
 knees, Dr. Chanelle Benítez  Obesity  NIKOLE on CPAP 2007  
 adherent with CPAP use  Symptomatic cholelithiasis 10/18/2017 Past Surgical History:  
Procedure Laterality Date  HX COLONOSCOPY  9/20/12  
 colon polyp, repeat in 5 yrs, Dr. Litzy Ramirez  HX ENDOSCOPY  5/19/16  
 gastritis, hiatal hernia (Dr. Vinod Naranjo)  HX OTHER SURGICAL    
 cyst removed from scalp - benign Social History Substance Use Topics  Smoking status: Never Smoker  Smokeless tobacco: Never Used  Alcohol use No  
  
Family History Problem Relation Age of Onset  Heart Disease Mother  Lung Disease Father  Diabetes Father  Heart Disease Father  COPD Father  Seizures Brother  Cancer Maternal Grandmother OVARIAN, COLON  
 Diabetes Paternal Uncle  Diabetes Paternal Grandfather  Colon Cancer Other   
  materal grandmothers siblings x 3-4  of colon cancer  Celiac Disease Brother  Heart Disease Maternal Grandfather Prior to Admission medications Medication Sig Start Date End Date Taking? Authorizing Provider  
levothyroxine (SYNTHROID) 25 mcg tablet Take 1 Tab by mouth Daily (before breakfast). 18  Yes Lindsey Melchor MD  
venlafaxine [facility]) 75 mg tablet Take 25 mg by mouth three (3) times daily. Yes Historical Provider  
venlafaxine-SR (EFFEXOR XR) 150 mg capsule Take  by mouth daily. Yes Historical Provider  
risperiDONE (RISPERDAL) 3 mg tablet Take 3 mg by mouth daily. Yes Historical Provider  
cpap machine kit by Does Not Apply route. Dx 327.23 7/13/15  Yes LEONOR Martinez  
TRAZODONE HCL (TRAZODONE PO) Take 25-50 mg by mouth nightly as needed. Yes Historical Provider  
lamoTRIgine (LAMICTAL) 100 mg tablet Take 200 mg by mouth nightly. Yes Historical Provider Allergies Allergen Reactions  Flagyl [Metronidazole] Other (comments)  
  ams Review of Systems: A comprehensive review of systems was negative except for that written in the History of Present Illness. Objective:  
 
Visit Vitals  /88 (BP 1 Location: Left arm, BP Patient Position: Sitting)  Pulse 94  Temp 98.2 °F (36.8 °C) (Oral)  Resp 16  
 Ht 5' 1\" (1.549 m)  Wt 264 lb (119.7 kg)  SpO2 98%  BMI 49.88 kg/m2 Physical Exam: 
General appearance: alert, cooperative, no distress, appears stated age Head: Normocephalic, without obvious abnormality, atraumatic Lungs: clear to auscultation bilaterally Heart: regular rate and rhythm, S1, S2 normal, no murmur, click, rub or gallop Lymph nodes: Cervical, supraclavicular, and axillary nodes normal. 
Neurologic: Grossly normal 
Abdomen: Soft, no tenderness, no organomegaly, no masses. Assessment: ICD-10-CM ICD-9-CM 1. Symptomatic cholelithiasis K80.20 574.20 Plan: Will do a laparoscopic cholecystectomy as an outpatient. Written by kandy Lundberg, as dictated by María Castaneda MD. Total face to face time with patient: 10 minutes. Greater than 50% of the time was spent in counseling. Signed By: María Castaneda MD  
 October 3, 2018

## 2018-10-03 NOTE — MR AVS SNAPSHOT
[address] [facility] [address] [facility] [address] [address] 
[phone] Patient: Florian Padron MRN:  [id number] Visit Information Date & Time Provider Department Dept. Phone Encounter #  
 10/3/2018 10:20 AM Divya Farley, [address] [phone] [id number] Follow-up Instructions Routing History Upcoming Health Maintenance Date Due Shingrix Vaccine Age 50> (1 of 2) 2009 DTaP/Tdap/Td series (1 - Tdap) 2010 BREAST CANCER SCRN MAMMOGRAM 2017 COLONOSCOPY 2017 PAP AKA CERVICAL CYTOLOGY 2018 MEDICARE YEARLY EXAM 2018 Influenza Age 5 to Adult 2018 Allergies as of 10/3/2018  Review Complete On: 10/3/2018 By: Divya Farley MD  
  
 Severity Noted Reaction Type Reactions Flagyl [Metronidazole]  2010   Intolerance Other (comments)  
 ams Current Immunizations  Never Reviewed Name Date  
 TB Skin Test (PPD) 2013 TD Vaccine 2010 11:09 PM  
  
 Not reviewed this visit You Were Diagnosed With   
  
 Codes Comments Symptomatic cholelithiasis    -  Primary ICD-10-CM: K80.20 ICD-9-CM: 574.20 Vitals BP Pulse Temp Resp Height(growth percentile) Weight(growth percentile) 150/88 (BP 1 Location: Left arm, BP Patient Position: Sitting) 94 98.2 °F (36.8 °C) (Oral) 16 5' 1\" (1.549 m) 264 lb (119.7 kg) SpO2 BMI OB Status Smoking Status 98% 49.88 kg/m2 Postmenopausal Never Smoker BMI and BSA Data Body Mass Index Body Surface Area  
 49.88 kg/m 2 2.27 m 2 Preferred Pharmacy Pharmacy Name Phone [address] [facility] [phone] Your Updated Medication List  
  
   
This list is accurate as of 10/3/18 11:42 AM.  Always use your most recent med list.  
  
  
  
  
 cpap machine kit  
by Does Not Apply route.  Dx 356.93  
  
 * EFFEXOR  mg capsule Generic drug:  venlafaxine-SR Take  by mouth daily. * venlafaxine 75 mg tablet Commonly known as:  [facility] Take 25 mg by mouth three (3) times daily. LaMICtal 100 mg tablet Generic drug:  lamoTRIgine Take 200 mg by mouth nightly. levothyroxine 25 mcg tablet Commonly known as:  SYNTHROID Take 1 Tab by mouth Daily (before breakfast). risperiDONE 3 mg tablet Commonly known as:  RisperDAL Take 3 mg by mouth daily. TRAZODONE PO Take 25-50 mg by mouth nightly as needed. * Notice: This list has 2 medication(s) that are the same as other medications prescribed for you. Read the directions carefully, and ask your doctor or other care provider to review them with you. Introducing [facility] SERVICES! Dear Petrona Tavera: Thank you for requesting a [employer] account. Our records indicate that you already have an active [employer] account. You can access your account anytime at [email or web address]. [email or web address] Did you know that you can access your hospital and ER discharge instructions at any time in [employer]? You can also review all of your test results from your hospital stay or ER visit. Additional Information If you have questions, please visit the Frequently Asked Questions section of the [employer] website at [email or web address] Remember, [employer] is NOT to be used for urgent needs. For medical emergencies, dial 911. Now available from your iPhone and Android! Please provide this summary of care documentation to your next provider. Your primary care clinician is listed as Farhana Martin. If you have any questions after today's visit, please call [phone].

## 2018-10-10 ENCOUNTER — HOSPITAL ENCOUNTER (OUTPATIENT)
Dept: PREADMISSION TESTING | Age: 58
Discharge: HOME | End: 2018-10-10
Payer: MEDICARE

## 2018-10-10 ENCOUNTER — HOSPITAL ENCOUNTER (OUTPATIENT)
Dept: GENERAL RADIOLOGY | Age: 58
Discharge: HOME | End: 2018-10-10
Attending: SURGERY
Payer: MEDICARE

## 2018-10-10 VITALS
BODY MASS INDEX: 47.66 KG/M2 | HEIGHT: 62 IN | SYSTOLIC BLOOD PRESSURE: 122 MMHG | TEMPERATURE: 98.2 F | DIASTOLIC BLOOD PRESSURE: 83 MMHG | HEART RATE: 89 BPM | WEIGHT: 259 LBS

## 2018-10-10 DIAGNOSIS — Z01.811 PRE-OP CHEST EXAM: ICD-10-CM

## 2018-10-10 LAB
ALBUMIN SERPL-MCNC: 3.7 G/DL (ref 3.5–5)
ALBUMIN/GLOB SERPL: 1.3 {RATIO} (ref 1.1–2.2)
ALP SERPL-CCNC: 101 U/L (ref 45–117)
ALT SERPL-CCNC: 34 U/L (ref 12–78)
ANION GAP SERPL CALC-SCNC: 8 MMOL/L (ref 5–15)
AST SERPL-CCNC: 16 U/L (ref 15–37)
BASOPHILS # BLD: 0 K/UL (ref 0–0.1)
BASOPHILS NFR BLD: 0 % (ref 0–1)
BILIRUB SERPL-MCNC: 0.4 MG/DL (ref 0.2–1)
BUN SERPL-MCNC: 27 MG/DL (ref 6–20)
BUN/CREAT SERPL: 32 (ref 12–20)
CALCIUM SERPL-MCNC: 8.7 MG/DL (ref 8.5–10.1)
CHLORIDE SERPL-SCNC: 105 MMOL/L (ref 97–108)
CO2 SERPL-SCNC: 27 MMOL/L (ref 21–32)
CREAT SERPL-MCNC: 0.85 MG/DL (ref 0.55–1.02)
DIFFERENTIAL METHOD BLD: ABNORMAL
EOSINOPHIL # BLD: 0.1 K/UL (ref 0–0.4)
EOSINOPHIL NFR BLD: 1 % (ref 0–7)
ERYTHROCYTE [DISTWIDTH] IN BLOOD BY AUTOMATED COUNT: 13.8 % (ref 11.5–14.5)
GLOBULIN SER CALC-MCNC: 2.9 G/DL (ref 2–4)
GLUCOSE SERPL-MCNC: 90 MG/DL (ref 65–100)
HCT VFR BLD AUTO: 41.7 % (ref 35–47)
HGB BLD-MCNC: 13.4 G/DL (ref 11.5–16)
IMM GRANULOCYTES # BLD: 0 K/UL (ref 0–0.04)
IMM GRANULOCYTES NFR BLD AUTO: 1 % (ref 0–0.5)
LYMPHOCYTES # BLD: 1.4 K/UL (ref 0.8–3.5)
LYMPHOCYTES NFR BLD: 19 % (ref 12–49)
MCH RBC QN AUTO: 29.5 PG (ref 26–34)
MCHC RBC AUTO-ENTMCNC: 32.1 G/DL (ref 30–36.5)
MCV RBC AUTO: 91.9 FL (ref 80–99)
MONOCYTES # BLD: 0.6 K/UL (ref 0–1)
MONOCYTES NFR BLD: 8 % (ref 5–13)
NEUTS SEG # BLD: 5.5 K/UL (ref 1.8–8)
NEUTS SEG NFR BLD: 72 % (ref 32–75)
NRBC # BLD: 0 K/UL (ref 0–0.01)
NRBC BLD-RTO: 0 PER 100 WBC
PLATELET # BLD AUTO: 191 K/UL (ref 150–400)
PMV BLD AUTO: 10.9 FL (ref 8.9–12.9)
POTASSIUM SERPL-SCNC: 4.3 MMOL/L (ref 3.5–5.1)
PROT SERPL-MCNC: 6.6 G/DL (ref 6.4–8.2)
RBC # BLD AUTO: 4.54 M/UL (ref 3.8–5.2)
SODIUM SERPL-SCNC: 140 MMOL/L (ref 136–145)
WBC # BLD AUTO: 7.6 K/UL (ref 3.6–11)

## 2018-10-10 PROCEDURE — 93005 ELECTROCARDIOGRAM TRACING: CPT

## 2018-10-10 PROCEDURE — 36415 COLL VENOUS BLD VENIPUNCTURE: CPT | Performed by: SURGERY

## 2018-10-10 PROCEDURE — 71046 X-RAY EXAM CHEST 2 VIEWS: CPT

## 2018-10-10 PROCEDURE — 85025 COMPLETE CBC W/AUTO DIFF WBC: CPT | Performed by: SURGERY

## 2018-10-10 PROCEDURE — 80053 COMPREHEN METABOLIC PANEL: CPT | Performed by: SURGERY

## 2018-10-10 RX ORDER — ACETAMINOPHEN 325 MG/1
650 TABLET ORAL
COMMUNITY
End: 2022-03-16

## 2018-10-10 RX ORDER — CHOLECALCIFEROL (VITAMIN D3) 125 MCG
4000 CAPSULE ORAL
COMMUNITY

## 2018-10-10 NOTE — PERIOP NOTES
PATIENT GIVEN SURGICAL SITE INFECTION FAQ HANDOUT AND HAND WASHING TIP SHEET. PREOP INSTRUCTIONS REVIEWED AND PATIENT VERBALIZES UNDERSTANDING OF INSTRUCTIONS. PATIENT HAS BEEN GIVEN THE OPPORTUNITY TO ASK ADDITIONAL QUESTIONS. PATIENT GIVEN 2 PACKAGES OF 6 CHG WIPES AND INSTRUCTIONS. PATIENT VERBALIZED UNDERSTANDING. SENT PATIENT TO [facility] FOR PREOP CHEST XRAY.

## 2018-10-11 LAB
ATRIAL RATE: 71 BPM
CALCULATED P AXIS, ECG09: 51 DEGREES
CALCULATED R AXIS, ECG10: 14 DEGREES
CALCULATED T AXIS, ECG11: 62 DEGREES
DIAGNOSIS, 93000: NORMAL
P-R INTERVAL, ECG05: 222 MS
Q-T INTERVAL, ECG07: 382 MS
QRS DURATION, ECG06: 76 MS
QTC CALCULATION (BEZET), ECG08: 415 MS
VENTRICULAR RATE, ECG03: 71 BPM

## 2018-10-15 ENCOUNTER — TELEPHONE (OUTPATIENT)
Dept: SURGERY | Age: 58
End: 2018-10-15

## 2018-10-15 NOTE — TELEPHONE ENCOUNTER
Patient called me because she wanted to remind Dr. Amina Noland that he agreed to place an implant in her back for local anesthesia the day of surgery. She wants to make sure that he's still planning this for her.

## 2018-10-17 ENCOUNTER — ANESTHESIA EVENT (OUTPATIENT)
Dept: SURGERY | Age: 58
End: 2018-10-17
Payer: MEDICARE

## 2018-10-17 NOTE — TELEPHONE ENCOUNTER
I returned patients call and per Dr. Grecia Joy left message stating that Dr. Grecia Joy wound be injecting a long term pain medication around the surgery site that would last about 3 days. There wound not be an implant placed anywhere on the body. Also told her if she had any questions to call me back.

## 2018-10-18 ENCOUNTER — HOSPITAL ENCOUNTER (OUTPATIENT)
Age: 58
Setting detail: OUTPATIENT SURGERY
Discharge: HOME | End: 2018-10-18
Attending: SURGERY | Admitting: SURGERY
Payer: MEDICARE

## 2018-10-18 ENCOUNTER — ANESTHESIA (OUTPATIENT)
Dept: SURGERY | Age: 58
End: 2018-10-18
Payer: MEDICARE

## 2018-10-18 VITALS
TEMPERATURE: 98.4 F | OXYGEN SATURATION: 95 % | BODY MASS INDEX: 47.66 KG/M2 | DIASTOLIC BLOOD PRESSURE: 79 MMHG | HEIGHT: 62 IN | RESPIRATION RATE: 15 BRPM | HEART RATE: 87 BPM | SYSTOLIC BLOOD PRESSURE: 122 MMHG | WEIGHT: 259 LBS

## 2018-10-18 DIAGNOSIS — G89.18 POST-OP PAIN: Primary | ICD-10-CM

## 2018-10-18 PROCEDURE — 74011250636 HC RX REV CODE- 250/636

## 2018-10-18 PROCEDURE — 77030018836 HC SOL IRR NACL ICUM -A: Performed by: SURGERY

## 2018-10-18 PROCEDURE — 74011250636 HC RX REV CODE- 250/636: Performed by: SURGERY

## 2018-10-18 PROCEDURE — 77030017006 HC DISECTR CRV1 J&J -B: Performed by: SURGERY

## 2018-10-18 PROCEDURE — 76060000034 HC ANESTHESIA 1.5 TO 2 HR: Performed by: SURGERY

## 2018-10-18 PROCEDURE — 88304 TISSUE EXAM BY PATHOLOGIST: CPT | Performed by: SURGERY

## 2018-10-18 PROCEDURE — 74011250636 HC RX REV CODE- 250/636: Performed by: ANESTHESIOLOGY

## 2018-10-18 PROCEDURE — 77030035051: Performed by: SURGERY

## 2018-10-18 PROCEDURE — 77030018876 HC APPL CLP LIG4 COVD -B: Performed by: SURGERY

## 2018-10-18 PROCEDURE — 76010000153 HC OR TIME 1.5 TO 2 HR: Performed by: SURGERY

## 2018-10-18 PROCEDURE — 77030009852 HC PCH RTVR ENDOSC COVD -B: Performed by: SURGERY

## 2018-10-18 PROCEDURE — 74011250637 HC RX REV CODE- 250/637: Performed by: ANESTHESIOLOGY

## 2018-10-18 PROCEDURE — 77030008684 HC TU ET CUF COVD -B: Performed by: ANESTHESIOLOGY

## 2018-10-18 PROCEDURE — 76210000016 HC OR PH I REC 1 TO 1.5 HR: Performed by: SURGERY

## 2018-10-18 PROCEDURE — 77030035048 HC TRCR ENDOSC OPTCL COVD -B: Performed by: SURGERY

## 2018-10-18 PROCEDURE — 77030008756 HC TU IRR SUC STRY -B: Performed by: SURGERY

## 2018-10-18 PROCEDURE — 77030037032 HC INSRT SCIS CLICKLLINE DISP STOR -B: Performed by: SURGERY

## 2018-10-18 PROCEDURE — 74011000250 HC RX REV CODE- 250: Performed by: SURGERY

## 2018-10-18 PROCEDURE — 77030032490 HC SLV COMPR SCD KNE COVD -B: Performed by: SURGERY

## 2018-10-18 PROCEDURE — 77030039266 HC ADH SKN EXOFIN S2SG -A: Performed by: SURGERY

## 2018-10-18 PROCEDURE — C9290 INJ, BUPIVACAINE LIPOSOME: HCPCS | Performed by: SURGERY

## 2018-10-18 PROCEDURE — 77030011640 HC PAD GRND REM COVD -A: Performed by: SURGERY

## 2018-10-18 PROCEDURE — 77030035045 HC TRCR ENDOSC VRSPRT BLDLSS COVD -B: Performed by: SURGERY

## 2018-10-18 PROCEDURE — 77030035029 HC NDL INSUF VERES DISP COVD -B: Performed by: SURGERY

## 2018-10-18 PROCEDURE — 77030020747 HC TU INSUF ENDOSC TELE -A: Performed by: SURGERY

## 2018-10-18 PROCEDURE — 77030002895 HC DEV VASC CLOSR COVD -B: Performed by: SURGERY

## 2018-10-18 PROCEDURE — 76210000021 HC REC RM PH II 0.5 TO 1 HR: Performed by: SURGERY

## 2018-10-18 PROCEDURE — 74011000250 HC RX REV CODE- 250

## 2018-10-18 PROCEDURE — 77030020782 HC GWN BAIR PAWS FLX 3M -B

## 2018-10-18 PROCEDURE — 77030002933 HC SUT MCRYL J&J -A: Performed by: SURGERY

## 2018-10-18 PROCEDURE — 77030031139 HC SUT VCRL2 J&J -A: Performed by: SURGERY

## 2018-10-18 RX ORDER — KETOROLAC TROMETHAMINE 30 MG/ML
INJECTION, SOLUTION INTRAMUSCULAR; INTRAVENOUS AS NEEDED
Status: DISCONTINUED | OUTPATIENT
Start: 2018-10-18 | End: 2018-10-18 | Stop reason: HOSPADM

## 2018-10-18 RX ORDER — DEXAMETHASONE SODIUM PHOSPHATE 4 MG/ML
INJECTION, SOLUTION INTRA-ARTICULAR; INTRALESIONAL; INTRAMUSCULAR; INTRAVENOUS; SOFT TISSUE AS NEEDED
Status: DISCONTINUED | OUTPATIENT
Start: 2018-10-18 | End: 2018-10-18 | Stop reason: HOSPADM

## 2018-10-18 RX ORDER — NEOSTIGMINE METHYLSULFATE 1 MG/ML
INJECTION INTRAVENOUS AS NEEDED
Status: DISCONTINUED | OUTPATIENT
Start: 2018-10-18 | End: 2018-10-18 | Stop reason: HOSPADM

## 2018-10-18 RX ORDER — SODIUM CHLORIDE, SODIUM LACTATE, POTASSIUM CHLORIDE, CALCIUM CHLORIDE 600; 310; 30; 20 MG/100ML; MG/100ML; MG/100ML; MG/100ML
INJECTION, SOLUTION INTRAVENOUS
Status: DISCONTINUED | OUTPATIENT
Start: 2018-10-18 | End: 2018-10-18 | Stop reason: HOSPADM

## 2018-10-18 RX ORDER — SODIUM CHLORIDE 9 MG/ML
25 INJECTION, SOLUTION INTRAVENOUS CONTINUOUS
Status: DISCONTINUED | OUTPATIENT
Start: 2018-10-18 | End: 2018-10-18 | Stop reason: HOSPADM

## 2018-10-18 RX ORDER — GLYCOPYRROLATE 0.2 MG/ML
0.2 INJECTION INTRAMUSCULAR; INTRAVENOUS
Status: DISCONTINUED | OUTPATIENT
Start: 2018-10-18 | End: 2018-10-18 | Stop reason: HOSPADM

## 2018-10-18 RX ORDER — CEFAZOLIN SODIUM/WATER 2 G/20 ML
2 SYRINGE (ML) INTRAVENOUS ONCE
Status: COMPLETED | OUTPATIENT
Start: 2018-10-18 | End: 2018-10-18

## 2018-10-18 RX ORDER — FENTANYL CITRATE 50 UG/ML
INJECTION, SOLUTION INTRAMUSCULAR; INTRAVENOUS AS NEEDED
Status: DISCONTINUED | OUTPATIENT
Start: 2018-10-18 | End: 2018-10-18 | Stop reason: HOSPADM

## 2018-10-18 RX ORDER — DIPHENHYDRAMINE HYDROCHLORIDE 50 MG/ML
12.5 INJECTION, SOLUTION INTRAMUSCULAR; INTRAVENOUS AS NEEDED
Status: DISCONTINUED | OUTPATIENT
Start: 2018-10-18 | End: 2018-10-18 | Stop reason: HOSPADM

## 2018-10-18 RX ORDER — MIDAZOLAM HYDROCHLORIDE 1 MG/ML
INJECTION, SOLUTION INTRAMUSCULAR; INTRAVENOUS AS NEEDED
Status: DISCONTINUED | OUTPATIENT
Start: 2018-10-18 | End: 2018-10-18 | Stop reason: HOSPADM

## 2018-10-18 RX ORDER — MIDAZOLAM HYDROCHLORIDE 1 MG/ML
1 INJECTION, SOLUTION INTRAMUSCULAR; INTRAVENOUS AS NEEDED
Status: DISCONTINUED | OUTPATIENT
Start: 2018-10-18 | End: 2018-10-18 | Stop reason: HOSPADM

## 2018-10-18 RX ORDER — FENTANYL CITRATE 50 UG/ML
50 INJECTION, SOLUTION INTRAMUSCULAR; INTRAVENOUS AS NEEDED
Status: DISCONTINUED | OUTPATIENT
Start: 2018-10-18 | End: 2018-10-18 | Stop reason: HOSPADM

## 2018-10-18 RX ORDER — GLYCOPYRROLATE 0.2 MG/ML
INJECTION INTRAMUSCULAR; INTRAVENOUS AS NEEDED
Status: DISCONTINUED | OUTPATIENT
Start: 2018-10-18 | End: 2018-10-18 | Stop reason: HOSPADM

## 2018-10-18 RX ORDER — PROPOFOL 10 MG/ML
INJECTION, EMULSION INTRAVENOUS AS NEEDED
Status: DISCONTINUED | OUTPATIENT
Start: 2018-10-18 | End: 2018-10-18 | Stop reason: HOSPADM

## 2018-10-18 RX ORDER — BUPIVACAINE HYDROCHLORIDE AND EPINEPHRINE 5; 5 MG/ML; UG/ML
30 INJECTION, SOLUTION EPIDURAL; INTRACAUDAL; PERINEURAL ONCE
Status: COMPLETED | OUTPATIENT
Start: 2018-10-18 | End: 2018-10-18

## 2018-10-18 RX ORDER — SODIUM CHLORIDE, SODIUM LACTATE, POTASSIUM CHLORIDE, CALCIUM CHLORIDE 600; 310; 30; 20 MG/100ML; MG/100ML; MG/100ML; MG/100ML
1000 INJECTION, SOLUTION INTRAVENOUS CONTINUOUS
Status: DISCONTINUED | OUTPATIENT
Start: 2018-10-18 | End: 2018-10-18 | Stop reason: HOSPADM

## 2018-10-18 RX ORDER — ONDANSETRON 2 MG/ML
INJECTION INTRAMUSCULAR; INTRAVENOUS AS NEEDED
Status: DISCONTINUED | OUTPATIENT
Start: 2018-10-18 | End: 2018-10-18 | Stop reason: HOSPADM

## 2018-10-18 RX ORDER — ROPIVACAINE HYDROCHLORIDE 5 MG/ML
30 INJECTION, SOLUTION EPIDURAL; INFILTRATION; PERINEURAL AS NEEDED
Status: DISCONTINUED | OUTPATIENT
Start: 2018-10-18 | End: 2018-10-18 | Stop reason: HOSPADM

## 2018-10-18 RX ORDER — LIDOCAINE HYDROCHLORIDE 10 MG/ML
0.1 INJECTION, SOLUTION EPIDURAL; INFILTRATION; INTRACAUDAL; PERINEURAL AS NEEDED
Status: DISCONTINUED | OUTPATIENT
Start: 2018-10-18 | End: 2018-10-18 | Stop reason: HOSPADM

## 2018-10-18 RX ORDER — MIDAZOLAM HYDROCHLORIDE 1 MG/ML
0.5 INJECTION, SOLUTION INTRAMUSCULAR; INTRAVENOUS
Status: DISCONTINUED | OUTPATIENT
Start: 2018-10-18 | End: 2018-10-18 | Stop reason: HOSPADM

## 2018-10-18 RX ORDER — ROCURONIUM BROMIDE 10 MG/ML
INJECTION, SOLUTION INTRAVENOUS AS NEEDED
Status: DISCONTINUED | OUTPATIENT
Start: 2018-10-18 | End: 2018-10-18 | Stop reason: HOSPADM

## 2018-10-18 RX ORDER — ALBUTEROL SULFATE 0.83 MG/ML
2.5 SOLUTION RESPIRATORY (INHALATION) AS NEEDED
Status: DISCONTINUED | OUTPATIENT
Start: 2018-10-18 | End: 2018-10-18 | Stop reason: HOSPADM

## 2018-10-18 RX ORDER — HYDROCODONE BITARTRATE AND ACETAMINOPHEN 5; 325 MG/1; MG/1
1 TABLET ORAL AS NEEDED
Status: DISCONTINUED | OUTPATIENT
Start: 2018-10-18 | End: 2018-10-18 | Stop reason: HOSPADM

## 2018-10-18 RX ORDER — SUCCINYLCHOLINE CHLORIDE 20 MG/ML
INJECTION INTRAMUSCULAR; INTRAVENOUS AS NEEDED
Status: DISCONTINUED | OUTPATIENT
Start: 2018-10-18 | End: 2018-10-18 | Stop reason: HOSPADM

## 2018-10-18 RX ORDER — LIDOCAINE HYDROCHLORIDE 20 MG/ML
INJECTION, SOLUTION EPIDURAL; INFILTRATION; INTRACAUDAL; PERINEURAL AS NEEDED
Status: DISCONTINUED | OUTPATIENT
Start: 2018-10-18 | End: 2018-10-18 | Stop reason: HOSPADM

## 2018-10-18 RX ORDER — ONDANSETRON 2 MG/ML
4 INJECTION INTRAMUSCULAR; INTRAVENOUS AS NEEDED
Status: DISCONTINUED | OUTPATIENT
Start: 2018-10-18 | End: 2018-10-18 | Stop reason: HOSPADM

## 2018-10-18 RX ORDER — FENTANYL CITRATE 50 UG/ML
25 INJECTION, SOLUTION INTRAMUSCULAR; INTRAVENOUS
Status: DISCONTINUED | OUTPATIENT
Start: 2018-10-18 | End: 2018-10-18 | Stop reason: HOSPADM

## 2018-10-18 RX ORDER — MORPHINE SULFATE 10 MG/ML
2 INJECTION, SOLUTION INTRAMUSCULAR; INTRAVENOUS
Status: DISCONTINUED | OUTPATIENT
Start: 2018-10-18 | End: 2018-10-18 | Stop reason: HOSPADM

## 2018-10-18 RX ORDER — HYDROCODONE BITARTRATE AND ACETAMINOPHEN 5; 325 MG/1; MG/1
1 TABLET ORAL
Qty: 15 TAB | Refills: 0 | Status: SHIPPED | OUTPATIENT
Start: 2018-10-18 | End: 2019-03-28 | Stop reason: ALTCHOICE

## 2018-10-18 RX ORDER — EPHEDRINE SULFATE 50 MG/ML
5 INJECTION, SOLUTION INTRAVENOUS AS NEEDED
Status: DISCONTINUED | OUTPATIENT
Start: 2018-10-18 | End: 2018-10-18 | Stop reason: HOSPADM

## 2018-10-18 RX ORDER — MORPHINE SULFATE 4 MG/ML
INJECTION, SOLUTION INTRAMUSCULAR; INTRAVENOUS AS NEEDED
Status: DISCONTINUED | OUTPATIENT
Start: 2018-10-18 | End: 2018-10-18 | Stop reason: HOSPADM

## 2018-10-18 RX ADMIN — SODIUM CHLORIDE, SODIUM LACTATE, POTASSIUM CHLORIDE, AND CALCIUM CHLORIDE 1000 ML: 600; 310; 30; 20 INJECTION, SOLUTION INTRAVENOUS at 06:30

## 2018-10-18 RX ADMIN — MIDAZOLAM HYDROCHLORIDE 2 MG: 1 INJECTION, SOLUTION INTRAMUSCULAR; INTRAVENOUS at 07:51

## 2018-10-18 RX ADMIN — HYDROCODONE BITARTRATE AND ACETAMINOPHEN 1 TABLET: 5; 325 TABLET ORAL at 11:36

## 2018-10-18 RX ADMIN — PROPOFOL 50 MG: 10 INJECTION, EMULSION INTRAVENOUS at 08:49

## 2018-10-18 RX ADMIN — LIDOCAINE HYDROCHLORIDE 80 MG: 20 INJECTION, SOLUTION EPIDURAL; INFILTRATION; INTRACAUDAL; PERINEURAL at 08:06

## 2018-10-18 RX ADMIN — GLYCOPYRROLATE 0.4 MG: 0.2 INJECTION INTRAMUSCULAR; INTRAVENOUS at 09:19

## 2018-10-18 RX ADMIN — SODIUM CHLORIDE, SODIUM LACTATE, POTASSIUM CHLORIDE, CALCIUM CHLORIDE: 600; 310; 30; 20 INJECTION, SOLUTION INTRAVENOUS at 09:35

## 2018-10-18 RX ADMIN — ROCURONIUM BROMIDE 35 MG: 10 INJECTION, SOLUTION INTRAVENOUS at 08:15

## 2018-10-18 RX ADMIN — DEXAMETHASONE SODIUM PHOSPHATE 8 MG: 4 INJECTION, SOLUTION INTRA-ARTICULAR; INTRALESIONAL; INTRAMUSCULAR; INTRAVENOUS; SOFT TISSUE at 08:27

## 2018-10-18 RX ADMIN — PROPOFOL 50 MG: 10 INJECTION, EMULSION INTRAVENOUS at 08:34

## 2018-10-18 RX ADMIN — MORPHINE SULFATE 2 MG: 4 INJECTION, SOLUTION INTRAMUSCULAR; INTRAVENOUS at 09:29

## 2018-10-18 RX ADMIN — ROCURONIUM BROMIDE 10 MG: 10 INJECTION, SOLUTION INTRAVENOUS at 08:34

## 2018-10-18 RX ADMIN — PROPOFOL 200 MG: 10 INJECTION, EMULSION INTRAVENOUS at 08:06

## 2018-10-18 RX ADMIN — FENTANYL CITRATE 25 MCG: 50 INJECTION INTRAMUSCULAR; INTRAVENOUS at 10:31

## 2018-10-18 RX ADMIN — MORPHINE SULFATE 2 MG: 4 INJECTION, SOLUTION INTRAMUSCULAR; INTRAVENOUS at 09:28

## 2018-10-18 RX ADMIN — ROCURONIUM BROMIDE 10 MG: 10 INJECTION, SOLUTION INTRAVENOUS at 08:49

## 2018-10-18 RX ADMIN — FENTANYL CITRATE 25 MCG: 50 INJECTION INTRAMUSCULAR; INTRAVENOUS at 10:24

## 2018-10-18 RX ADMIN — SUCCINYLCHOLINE CHLORIDE 200 MG: 20 INJECTION INTRAMUSCULAR; INTRAVENOUS at 08:07

## 2018-10-18 RX ADMIN — FENTANYL CITRATE 100 MCG: 50 INJECTION, SOLUTION INTRAMUSCULAR; INTRAVENOUS at 08:06

## 2018-10-18 RX ADMIN — Medication 2 G: at 08:12

## 2018-10-18 RX ADMIN — ROCURONIUM BROMIDE 5 MG: 10 INJECTION, SOLUTION INTRAVENOUS at 08:06

## 2018-10-18 RX ADMIN — NEOSTIGMINE METHYLSULFATE 3 MG: 1 INJECTION INTRAVENOUS at 09:19

## 2018-10-18 RX ADMIN — SODIUM CHLORIDE, SODIUM LACTATE, POTASSIUM CHLORIDE, CALCIUM CHLORIDE: 600; 310; 30; 20 INJECTION, SOLUTION INTRAVENOUS at 07:51

## 2018-10-18 RX ADMIN — FENTANYL CITRATE 100 MCG: 50 INJECTION, SOLUTION INTRAMUSCULAR; INTRAVENOUS at 08:28

## 2018-10-18 RX ADMIN — FENTANYL CITRATE 50 MCG: 50 INJECTION, SOLUTION INTRAMUSCULAR; INTRAVENOUS at 08:34

## 2018-10-18 RX ADMIN — GLYCOPYRROLATE 0.2 MG: 0.2 INJECTION INTRAMUSCULAR; INTRAVENOUS at 07:51

## 2018-10-18 RX ADMIN — KETOROLAC TROMETHAMINE 30 MG: 30 INJECTION, SOLUTION INTRAMUSCULAR; INTRAVENOUS at 09:20

## 2018-10-18 RX ADMIN — ONDANSETRON 4 MG: 2 INJECTION INTRAMUSCULAR; INTRAVENOUS at 09:19

## 2018-10-18 NOTE — BRIEF OP NOTE
BRIEF OPERATIVE NOTE    Date of Procedure: 10/18/2018   Preoperative Diagnosis: SYMPTOMATIC CHOLELITHIASIS   Postoperative Diagnosis: SYMPTOMATIC CHOLELITHIASIS     Procedure(s):  LAPAROSCOPIC CHOLECYSTECTOMY  Surgeon(s) and Role:     Zayda Lockwood MD - Primary         Surgical Assistant: Ana    Surgical Staff:  Circ-1: Bijal Tobar RN  Circ-Relief: Barbara Magana Tech-1: Reymundo Mello  Surg Asst-1: Armand Lammanuel  Event Time In Time Out   Incision Start 0830    Incision Close 0931      Anesthesia: General   Estimated Blood Loss: minimal  Specimens:   ID Type Source Tests Collected by Time Destination   1 : gallbladder Fresh Gallbladder  Jailene Anderson MD 10/18/2018 8470 Pathology      Findings: large stone with smaller ones as well   Complications: none  Implants: * No implants in log *    [id number]

## 2018-10-18 NOTE — DISCHARGE INSTRUCTIONS
Cholecystectomy: What to Expect at [address] [place] [address]  After your surgery, it is normal to feel weak and tired for several days after you return home. Your belly may be swollen. If you had laparoscopic surgery, you may also have pain in your shoulder for about 24 hours. You may have gas or need to burp a lot at first, and a few people get diarrhea. The diarrhea usually goes away in 2 to 4 weeks, but it may last longer. How quickly you recover depends on whether you had a laparoscopic or open surgery. · For a laparoscopic surgery, most people can go back to work or their normal routine in 1 to 2 weeks, but it may take longer, depending on the type of work you do. · For an open surgery, it will probably take 4 to 6 weeks before you get back to your normal routine. This care sheet gives you a general idea about how long it will take for you to recover. However, each person recovers at a different pace. Follow the steps below to get better as quickly as possible. How can you care for yourself at home? Activity    · Rest when you feel tired. Getting enough sleep will help you recover.     · Try to walk each day. Start out by walking a little more than you did the day before. Gradually increase the amount you walk. Walking boosts blood flow and helps prevent pneumonia and constipation.     · For about 2 to 4 weeks, avoid lifting anything that would make you strain. This may include a child, heavy grocery bags and milk containers, a heavy briefcase or backpack, cat litter or dog food bags, or a vacuum .     · Avoid strenuous activities, such as biking, jogging, weightlifting, and aerobic exercise, until your doctor says it is okay.     · You may shower 24 to 48 hours after surgery, if your doctor okays it. Pat the cut (incision) dry.  Do not take a bath for the first 2 weeks, or until your doctor tells you it is okay.     · You may drive when you are no longer taking pain medicine and can quickly move your foot from the gas pedal to the brake. You must also be able to sit comfortably for a long period of time, even if you do not plan to go far. You might get caught in traffic.     · For a laparoscopic surgery, most people can go back to work or their normal routine in 1 to 2 weeks, but it may take longer. For an open surgery, it will probably take 4 to 6 weeks before you get back to your normal routine.     · Your doctor will tell you when you can have sex again.    Diet    · Eat smaller meals more often instead of fewer larger meals. You can eat a normal diet, but avoid eating fatty foods for about 1 month. Fatty foods include hamburger, whole milk, cheese, and many snack foods. If your stomach is upset, try bland, low-fat foods like plain rice, broiled chicken, toast, and yogurt.     · Drink plenty of fluids (unless your doctor tells you not to).   · If you have diarrhea, try avoiding spicy foods, dairy products, fatty foods, and alcohol. You can also watch to see if specific foods cause it, and stop eating them. If the diarrhea continues for more than 2 weeks, talk to your doctor.     · You may notice that your bowel movements are not regular right after your surgery. This is common. Try to avoid constipation and straining with bowel movements. You may want to take a fiber supplement every day. If you have not had a bowel movement after a couple of days, ask your doctor about taking a mild laxative. Medicines    · Your doctor will tell you if and when you can restart your medicines. He or she will also give you instructions about taking any new medicines.     · If you take blood thinners, such as warfarin (Coumadin), clopidogrel (Plavix), or aspirin, be sure to talk to your doctor. He or she will tell you if and when to start taking those medicines again. Make sure that you understand exactly what your doctor wants you to do.     · Take pain medicines exactly as directed.   ? If the doctor gave you a prescription medicine for pain, take it as prescribed. ? If you are not taking a prescription pain medicine, take an over-the-counter medicine such as acetaminophen (Tylenol), ibuprofen (Advil, Motrin), or naproxen (Aleve). Read and follow all instructions on the label. ? Do not take two or more pain medicines at the same time unless the doctor told you to. Many pain medicines contain acetaminophen, which is Tylenol. Too much Tylenol can be harmful.     · If you think your pain medicine is making you sick to your stomach:  ? Take your medicine after meals (unless your doctor tells you not to). ? Ask your doctor for a different pain medicine.     · If your doctor prescribed antibiotics, take them as directed. Do not stop taking them just because you feel better. You need to take the full course of antibiotics. Incision care    · If you have strips of tape on the incision, or cut, leave the tape on for a week or until it falls off.     · After 24 to 48 hours, wash the area daily with warm, soapy water, and pat it dry.     · You may have staples to hold the cut together. Keep them dry until your doctor takes them out. This is usually in 7 to 10 days.     · Keep the area clean and dry. You may cover it with a gauze bandage if it weeps or rubs against clothing. Change the bandage every day.    Ice    · To reduce swelling and pain, put ice or a cold pack on your belly for 10 to 20 minutes at a time. Do this every 1 to 2 hours. Put a thin cloth between the ice and your skin. Follow-up care is a key part of your treatment and safety. Be sure to make and go to all appointments, and call your doctor if you are having problems. It's also a good idea to know your test results and keep a list of the medicines you take. When should you call for help? Call 911 anytime you think you may need emergency care. For example, call if:    · You passed out (lost consciousness).     · You are short of breath. Gerri Mattson your doctor now or seek immediate medical care if:    · You are sick to your stomach and cannot drink fluids.     · You have pain that does not get better when you take your pain medicine.     · You cannot pass stools or gas.     · You have signs of infection, such as:  ? Increased pain, swelling, warmth, or redness. ? Red streaks leading from the incision. ? Pus draining from the incision. ? A fever.     · Bright red blood has soaked through the bandage over your incision.     · You have loose stitches, or your incision comes open.     · You have signs of a blood clot in your leg (called a deep vein thrombosis), such as:  ? Pain in your calf, back of knee, thigh, or groin. ? Redness and swelling in your leg or groin.    Watch closely for any changes in your health, and be sure to contact your doctor if you have any problems. Where can you learn more? Go to http://dori-dipak.info/. Enter 464 00 [phone] in the search box to learn more about \"Cholecystectomy: What to Expect at Home. \"  Current as of: 2018  Content Version: 11.8  © 8873-8591 [employer]. Care instructions adapted under license by [employer] (which disclaims liability or warranty for this information). If you have questions about a medical condition or this instruction, always ask your healthcare professional. [place] [address] any warranty or liability for your use of this information. Patient Discharge Instructions    Darrell Butt / [id number] : 1960    Admitted 10/18/2018 Discharged: 10/18/2018     Take Home Medications            · It is important that you take the medication exactly as they are prescribed. · Keep your medication in the bottles provided by the pharmacist and keep a list of the medication names, dosages, and times to be taken in your wallet. · Do not take other medications without consulting your doctor.        What to do at Home    Recommended diet: Regular Diet,     Recommended activity: Activity as tolerated, may shower any time    . Follow-up Appointments   Procedures    FOLLOW UP VISIT Appointment in: Two Weeks     Standing Status:   Standing     Number of Occurrences:   1     Order Specific Question:   Appointment in     Answer: Two Weeks           Information obtained by :  I understand that if any problems occur once I am at home I am to contact my physician. I understand and acknowledge receipt of the instructions indicated above. Physician's or R.N.'s Signature                                                                  Date/Time                                                                                                                                              Patient or Representative Signature                                                          Date/Time    ______________________________________________________________________    Anesthesia Discharge Instructions    After general anesthesia or intervenous sedation, for 24 hours or while taking prescription Narcotics:  · Limit your activities  · Do not drive or operate hazardous machinery  · If you have not urinated within 8 hours after discharge, please contact your surgeon on call. · Do not make important personal or business decisions  · Do not drink alcoholic beverages    Report the following to your surgeon:  · Excessive pain, swelling, redness or odor of or around the surgical area  · Temperature over 100.5 degrees  · Nausea and vomiting lasting longer than 4 hours or if unable to take medication  · Any signs of decreased circulation or nerve impairment to extremity:  Change in color, persistent numbness, tingling, coldness or increased pain.   · Any questions

## 2018-10-18 NOTE — ANESTHESIA PREPROCEDURE EVALUATION
Anesthetic History   No history of anesthetic complications            Review of Systems / Medical History  Patient summary reviewed, nursing notes reviewed and pertinent labs reviewed    Pulmonary        Sleep apnea: CPAP           Neuro/Psych         Psychiatric history     Cardiovascular                  Exercise tolerance: >4 METS     GI/Hepatic/Renal     GERD: well controlled           Endo/Other      Hypothyroidism: well controlled  Obesity and arthritis     Other Findings              Physical Exam    Airway  Mallampati: II  TM Distance: > 6 cm  Neck ROM: normal range of motion   Mouth opening: Normal     Cardiovascular  Regular rate and rhythm,  S1 and S2 normal,  no murmur, click, rub, or gallop             Dental  No notable dental hx       Pulmonary  Breath sounds clear to auscultation               Abdominal  GI exam deferred       Other Findings            Anesthetic Plan    ASA: 3  Anesthesia type: general          Induction: Intravenous  Anesthetic plan and risks discussed with: Patient

## 2018-10-18 NOTE — ANESTHESIA POSTPROCEDURE EVALUATION
Post-Anesthesia Evaluation and Assessment Patient: Dagoberto Solorio MRN: [id number]  SSN: [id number] YOB: 1960  Age: 60 y.o. Sex: female Cardiovascular Function/Vital Signs Visit Vitals /59 Pulse 75 Temp 36.9 °C (98.4 °F) Resp 16 Ht 5' 2\" (1.575 m) Wt 117.5 kg (259 lb) SpO2 100% BMI 47.37 kg/m² Patient is status post General anesthesia for Procedure(s): LAPAROSCOPIC CHOLECYSTECTOMY. Nausea/Vomiting: None Postoperative hydration reviewed and adequate. Pain: 
Pain Scale 1: Numeric (0 - 10) (10/18/18 1030) Pain Intensity 1: 5 (10/18/18 1030) Managed Neurological Status:  
Neuro (WDL): Within Defined Limits (10/18/18 0950) At baseline Mental Status, Level of Consciousness: Alert and  oriented to person, place, and time Pulmonary Status:  
O2 Device: Nasal cannula (10/18/18 0950) Adequate oxygenation and airway patent Complications related to anesthesia: None Post-anesthesia assessment completed. No concerns Signed By: Darren Poon MD   
 October 18, 2018 Procedure(s): LAPAROSCOPIC CHOLECYSTECTOMY. 
 
<BSHSIANPOST> Visit Vitals /59 Pulse 75 Temp 36.9 °C (98.4 °F) Resp 16 Ht 5' 2\" (1.575 m) Wt 117.5 kg (259 lb) SpO2 100% BMI 47.37 kg/m²

## 2018-10-18 NOTE — INTERVAL H&P NOTE
H&P Update:  Amarilys Velasco was seen and examined. History and physical has been reviewed. The patient has been examined.  There have been no significant clinical changes since the completion of the originally dated History and Physical.    Signed By: Shannan Sheridan MD     October 18, 2018 6:21 AM

## 2018-10-18 NOTE — OP NOTES
[address]  OPERATIVE REPORT    Thanh Lund  MR#: [id number]  : 1960  ACCOUNT #: [de-identified]   DATE OF SERVICE: 10/18/2018    PREOPERATIVE DIAGNOSIS:  Symptomatic cholelithiasis. POSTOPERATIVE DIAGNOSIS:  Symptomatic cholelithiasis. PROCEDURE PERFORMED:  Laparoscopic cholecystectomy. SURGEON:  Gage Guzmán. David Hooks MD    ANESTHESIA:  General supplemented with 0.5% Marcaine with epinephrine and terminally with little bit of Exparel    ASSISTANT:  Bhupendra Perez     ESTIMATED BLOOD LOSS:  Minimal.    SPECIMENS REMOVED:  Gallbladder. FINDINGS:  That of a large stone with smaller ones as well. COMPLICATIONS:  None. IMPLANTS:  None. CONDITION:  Good to the PACU. DESCRIPTION OF PROCEDURE:  With the patient supine and suitably anesthetized, the abdomen was prepped with ChloraPrep and draped in the field. 0.5% Marcaine with epinephrine was infiltrated in the appropriate places. A subumbilical incision was made. A Veress needle was inserted and I could never convince myself I had a satisfactory placement. I then made a small incision to the medial more of the 2 right upper quadrant sites and then entered the abdomen under direct vision with ease. Pneumoperitoneum was then established. A 5 mm was placed in the umbilicus spot and another 5 mm was placed laterally in the right upper quadrant all under direct vision now. A 12 mm was placed in the midline superiorly. The patient was placed in reverse Trendelenburg position and turned to the left. The fundus of the gallbladder was grasped and retracted superiorly and to the right. The peritoneum overlying the infundibulum was opened anteriorly and posteriorly. Cystic duct was isolated, doubly clipped distally, singly clipped proximally and divided. The cystic artery was identified and doubly clipped proximally, singly clipped distally and divided.   A separate small posterior branch was clipped and divided as well. Gallbladder was then removed from the liver bed with a bit of tedious and secondary to the big stone causing a lot of inflammation between the gallbladder and the liver itself. Eventually, the gallbladder was  completely placed into a retrieval bag and brought out through the superior trocar site, which had to be opened to the point where the incision was actually 3.5 cm in length just to get the stone out. Once that was done and I did open the bag and spilled some bile into the wound, the wound was irrigated copiously. The fascia then was closed with a single 0 Vicryl suture passed using EndoClose device under direct vision. I then reirrigated that wound completely with another almost a liter of saline. All the trocars were then removed and pneumoperitoneum was released as well. The area of the transverse incision and medially was then infiltrated with 20 mL of Exparel. Marcaine was placed around all the other incisions again. The midline type incision transverse incision was closed with interrupted 3-0 Vicryl to the subcutaneous tissues and subcuticular Monocryl followed by glue. Three other incisions were closed with a subcuticular Monocryl followed by glue. At termination of procedure, all counts were correct. The patient tolerated this well and was brought to the PACU in satisfactory condition. MD Sherman Lopes  D: 10/18/2018 09:48     T: 10/18/2018 11:46  JOB #: [id number]  CC: Tuan Stafford MD

## 2018-10-18 NOTE — ROUTINE PROCESS
Patient: Abbi Emerson MRN: [id number]  SSN: [id number]   YOB: 1960  Age: 60 y.o. Sex: female     Patient is status post Procedure(s):  LAPAROSCOPIC CHOLECYSTECTOMY.     Surgeon(s) and Role:     * Eliana Wilson MD - Primary    Local/Dose/Irrigation: 0.5 Marcaine plain 19 cc used                  Peripheral IV 10/18/18 Right Hand (Active)   Site Assessment Clean, dry, & intact 10/18/2018  6:28 AM   Phlebitis Assessment 0 10/18/2018  6:28 AM   Infiltration Assessment 0 10/18/2018  6:28 AM   Dressing Status Clean, dry, & intact 10/18/2018  6:28 AM   Dressing Type Transparent;Tape 10/18/2018  6:28 AM   Hub Color/Line Status Pink 10/18/2018  6:28 AM            Airway - Endotracheal Tube 10/18/18 Oral (Active)                   Dressing/Packing:   exofin to trocar sites  Splint/Cast:  ]    Other:

## 2018-10-26 ENCOUNTER — TELEPHONE (OUTPATIENT)
Dept: SURGERY | Age: 58
End: 2018-10-26

## 2018-10-26 NOTE — TELEPHONE ENCOUNTER
I returned patients call and verified patient with 2 patient identifiers. She states the belly button incision was sore and and yellowish yesterday  so she put some peroxide and neosporin on it. She said it is better today. I told her she  could shower over the area and let the water run over the incisions and pat them gently dry and leave them alone. She will make an appointment if she has further concerns about the area.

## 2018-11-05 ENCOUNTER — OFFICE VISIT (OUTPATIENT)
Dept: SURGERY | Age: 58
End: 2018-11-05

## 2018-11-05 VITALS
HEART RATE: 80 BPM | OXYGEN SATURATION: 94 % | RESPIRATION RATE: 20 BRPM | BODY MASS INDEX: 48.4 KG/M2 | HEIGHT: 62 IN | SYSTOLIC BLOOD PRESSURE: 116 MMHG | DIASTOLIC BLOOD PRESSURE: 80 MMHG | TEMPERATURE: 98.2 F | WEIGHT: 263 LBS

## 2018-11-05 DIAGNOSIS — Z09 POSTOPERATIVE EXAMINATION: Primary | ICD-10-CM

## 2018-11-05 NOTE — PROGRESS NOTES
1. Have you been to the ER, urgent care clinic since your last visit? Hospitalized since your last visit? No 
 
2. Have you seen or consulted any other health care providers outside of the [address] [place] [address] since your last visit? Include any pap smears or colon screening.  No

## 2018-11-05 NOTE — PROGRESS NOTES
Subjective:  
  
Lani Amador is a 60 y.o. female presents for postop care 18 days following lap nicki by Dr. Brayden Rasmussen. Appetite is good. Eating a regular diet without difficulty. Bowel movements are regular although it is difficult to evacuate at times. This is a long standing issue. The patient is voiding without difficulty. The patient is not having any pain. Deepak Muse Advance directive on file Objective:  
 
Visit Vitals /80 Pulse 80 Temp 98.2 °F (36.8 °C) (Oral) Resp 20 Ht 5' 2\" (1.575 m) Wt 263 lb (119.3 kg) SpO2 94% BMI 48.10 kg/m² General:  alert, cooperative, no distress, appears stated age Abdomen: soft, bowel sounds active, non-tender Incision:   healing well, no drainage, no erythema, no hernia, no seroma, no swelling, no dehiscence, incision well approximated Pathology: chronic cholecystis and cholelithiasis Assessment:  
 
Doing well postoperatively. Plan: 1. Continue any current medications. 2. Wound care discussed. 3. Pt is to increase activities as tolerated. 4. Avoid fatty foods for another 2 weeks. 5. Shared information about constipation- lots of water, high fiber diet, colace, miralax.

## 2018-11-05 NOTE — PATIENT INSTRUCTIONS
Drink at least 8 cups of water per day. Avoid bananas and dairy products. Prunes, prune juice and pineapples are helpful to relieve constipation. Also may take Colace over-the-counter and take as directed until relief. If needed, may take Miralax over the counter and use as directed. Cholecystectomy: What to Expect at [facility] Your Recovery After your surgery, it is normal to feel weak and tired for several days after you return home. Your belly may be swollen. If you had laparoscopic surgery, you may also have pain in your shoulder for about 24 hours. You may have gas or need to burp a lot at first, and a few people get diarrhea. The diarrhea usually goes away in 2 to 4 weeks, but it may last longer. How quickly you recover depends on whether you had a laparoscopic or open surgery. · For a laparoscopic surgery, most people can go back to work or their normal routine in 1 to 2 weeks, but it may take longer, depending on the type of work you do. · For an open surgery, it will probably take 4 to 6 weeks before you get back to your normal routine. This care sheet gives you a general idea about how long it will take for you to recover. However, each person recovers at a different pace. Follow the steps below to get better as quickly as possible. How can you care for yourself at home? Activity 
  · Rest when you feel tired. Getting enough sleep will help you recover.  
  · Try to walk each day. Start out by walking a little more than you did the day before. Gradually increase the amount you walk. Walking boosts blood flow and helps prevent pneumonia and constipation.  
  · For about 2 to 4 weeks, avoid lifting anything that would make you strain.  This may include a child, heavy grocery bags and milk containers, a heavy briefcase or backpack, cat litter or dog food bags, or a vacuum .  
  · Avoid strenuous activities, such as biking, jogging, weightlifting, and aerobic exercise, until your doctor says it is okay.  
  · You may shower 24 to 48 hours after surgery, if your doctor okays it. Pat the cut (incision) dry. Do not take a bath for the first 2 weeks, or until your doctor tells you it is okay.  
  · You may drive when you are no longer taking pain medicine and can quickly move your foot from the gas pedal to the brake. You must also be able to sit comfortably for a long period of time, even if you do not plan to go far. You might get caught in traffic.  
  · For a laparoscopic surgery, most people can go back to work or their normal routine in 1 to 2 weeks, but it may take longer. For an open surgery, it will probably take 4 to 6 weeks before you get back to your normal routine.  
  · Your doctor will tell you when you can have sex again.  
 Diet 
  · Eat smaller meals more often instead of fewer larger meals. You can eat a normal diet, but avoid eating fatty foods for about 1 month. Fatty foods include hamburger, whole milk, cheese, and many snack foods. If your stomach is upset, try bland, low-fat foods like plain rice, broiled chicken, toast, and yogurt.  
  · Drink plenty of fluids (unless your doctor tells you not to).   · If you have diarrhea, try avoiding spicy foods, dairy products, fatty foods, and alcohol. You can also watch to see if specific foods cause it, and stop eating them. If the diarrhea continues for more than 2 weeks, talk to your doctor.  
  · You may notice that your bowel movements are not regular right after your surgery. This is common. Try to avoid constipation and straining with bowel movements. You may want to take a fiber supplement every day. If you have not had a bowel movement after a couple of days, ask your doctor about taking a mild laxative. Medicines 
  · Your doctor will tell you if and when you can restart your medicines. He or she will also give you instructions about taking any new medicines.   · If you take blood thinners, such as warfarin (Coumadin), clopidogrel (Plavix), or aspirin, be sure to talk to your doctor. He or she will tell you if and when to start taking those medicines again. Make sure that you understand exactly what your doctor wants you to do.  
  · Take pain medicines exactly as directed. ? If the doctor gave you a prescription medicine for pain, take it as prescribed. ? If you are not taking a prescription pain medicine, take an over-the-counter medicine such as acetaminophen (Tylenol), ibuprofen (Advil, Motrin), or naproxen (Aleve). Read and follow all instructions on the label. ? Do not take two or more pain medicines at the same time unless the doctor told you to. Many pain medicines contain acetaminophen, which is Tylenol. Too much Tylenol can be harmful.  
  · If you think your pain medicine is making you sick to your stomach: 
? Take your medicine after meals (unless your doctor tells you not to). ? Ask your doctor for a different pain medicine.  
  · If your doctor prescribed antibiotics, take them as directed. Do not stop taking them just because you feel better. You need to take the full course of antibiotics. Incision care 
  · If you have strips of tape on the incision, or cut, leave the tape on for a week or until it falls off.  
  · After 24 to 48 hours, wash the area daily with warm, soapy water, and pat it dry.  
  · You may have staples to hold the cut together. Keep them dry until your doctor takes them out. This is usually in 7 to 10 days.  
  · Keep the area clean and dry. You may cover it with a gauze bandage if it weeps or rubs against clothing. Change the bandage every day.  
 Ice 
  · To reduce swelling and pain, put ice or a cold pack on your belly for 10 to 20 minutes at a time. Do this every 1 to 2 hours. Put a thin cloth between the ice and your skin. Follow-up care is a key part of your treatment and safety.  Be sure to make and go to all appointments, and call your doctor if you are having problems. It's also a good idea to know your test results and keep a list of the medicines you take. When should you call for help? Call 911 anytime you think you may need emergency care. For example, call if: 
  · You passed out (lost consciousness).  
  · You are short of breath. Susanna Loaizahin your doctor now or seek immediate medical care if: 
  · You are sick to your stomach and cannot drink fluids.  
  · You have pain that does not get better when you take your pain medicine.  
  · You cannot pass stools or gas.  
  · You have signs of infection, such as: 
? Increased pain, swelling, warmth, or redness. ? Red streaks leading from the incision. ? Pus draining from the incision. ? A fever.  
  · Bright red blood has soaked through the bandage over your incision.  
  · You have loose stitches, or your incision comes open.  
  · You have signs of a blood clot in your leg (called a deep vein thrombosis), such as: 
? Pain in your calf, back of knee, thigh, or groin. ? Redness and swelling in your leg or groin.  
 Watch closely for any changes in your health, and be sure to contact your doctor if you have any problems. Where can you learn more? Go to http://dori-dipak.info/. Enter [phone] in the search box to learn more about \"Cholecystectomy: What to Expect at Home. \" Current as of: March 28, 2018 Content Version: 11.8 © 5914-0761 [employer]wise[employer]. Care instructions adapted under license by [employer] (which disclaims liability or warranty for this information). If you have questions about a medical condition or this instruction, always ask your healthcare professional. [place] [address] any warranty or liability for your use of this information.

## 2019-01-08 ENCOUNTER — HOSPITAL ENCOUNTER (OUTPATIENT)
Dept: NUTRITION | Age: 59
Discharge: HOME | End: 2019-01-08
Attending: INTERNAL MEDICINE
Payer: MEDICARE

## 2019-01-08 DIAGNOSIS — E66.01 MORBID OBESITY (HCC): ICD-10-CM

## 2019-01-08 PROCEDURE — 97802 MEDICAL NUTRITION INDIV IN: CPT | Performed by: DIETITIAN, REGISTERED

## 2019-01-08 NOTE — PROGRESS NOTES
[facility] - Follow Up Nutrition Counseling  2019    Referring Physician/Surgeon: Dr. Almeida Israel  Name: Sky Pham : 1960 Age: 60 y.o. Gender: female  Reason for visit: Follow up obesity      Medications/Supplements:   Prior to Admission medications    Medication Sig Start Date End Date Taking? Authorizing Provider   HYDROcodone-acetaminophen (NORCO) 5-325 mg per tablet Take 1 Tab by mouth every eight (8) hours as needed for Pain. Max Daily Amount: 3 Tabs. 10/18/18   Ashley Sherwood MD   cholecalciferol, vitamin D3, (VITAMIN D3) 2,000 unit tab Take 4,000 Units by mouth daily. Provider, Historical   acetaminophen (TYLENOL) 325 mg tablet Take 650 mg by mouth every four (4) hours as needed for Pain. Provider, Historical   levothyroxine (SYNTHROID) 25 mcg tablet Take 1 Tab by mouth Daily (before breakfast). Patient taking differently: Take 25 mcg by mouth nightly. 18   Pritesh Peterson MD   [facility]) 75 mg tablet Take 25 mg by mouth daily. Provider, Historical   venlafaxine-SR (EFFEXOR XR) 150 mg capsule Take 150 mg by mouth daily. Provider, Historical   risperiDONE (RISPERDAL) 3 mg tablet Take 3 mg by mouth nightly. Provider, Historical   cpap machine kit by Does Not Apply route. Dx 327.23 7/13/15   LEONOR Carmen   TRAZODONE HCL (TRAZODONE PO) Take 50 mg by mouth nightly as needed. Provider, Historical   lamoTRIgine (LAMICTAL) 100 mg tablet Take 200 mg by mouth nightly.     Provider, Historical       Pertinent Labs: No recent labs to review    Anthropometrics:    Ht Readings from Last 1 Encounters:   18 5' 2\" (1.575 m)       Wt Readings from Last 10 Encounters:   18 119.3 kg (263 lb)   10/18/18 117.5 kg (259 lb)   10/10/18 117.5 kg (259 lb)   10/03/18 119.7 kg (264 lb)   18 120 kg (264 lb 9.6 oz)   18 121.9 kg (268 lb 11.2 oz)   10/27/17 122.2 kg (269 lb 4.8 oz)   10/18/17 124.3 kg (274 lb)   17 121.8 kg (268 lb 8 oz)   11/14/16 123.4 kg (272 lb)      Today's Wt: 253# (115 kg) IBW(230%IBW): 110 lb BMI: 46.4  Category: Obesity Class III    Exercise/Physical Actvity:  None    Estimated Nutritional Needs:  1020-8840 kcal/day (MSJ x 1.2-1.3 - 750 to promote 1.5# wt loss per week); 75g protein (20% of recommended calories)      Reported Diet History:     24 Hour Diet Recall  Breakfast  Oatmeal or eggs/cheese, fruit   Lunch 1pm Protein, veggie, starch   snack  fruit   Dinner 5-6pm Protein, veggie, starch   Snacks  fruit   Beverages  [place] water, tea, water       NUTRITION DIAGNOSIS:  Obesity r/t excessive caloric intake, lack of planning meals, emotional eating as evidenced by pt with BMI of 46.4    NUTRITION ASSESSMENT /  INTERVENTION:  Pt seen for follow up obesity from June 2018. Pt indicated wanting to refocus on weigh loss with long term goal wt os 130#; short term goal wt 245# (8# loss in next month). Pt states recently cutting out white flour, sugar and high fat foods (feels they are triggers to overeat) Pt has not been planning out meals as previously recommended but feels like important; plans on restarting. Discussed adding lean protein to each meal and and snack and reviewed proper portion sizes of foods. Recommended increasing exercise- pt plans on going to community center and swimming twice a week and doing chair exercises on non-swim days. Pt is in the preparation stage of behavior change; will follow up in one month. PATIENT GOALS:  1. Use plate method for meals. Plan Week 2 menus and shop accordingly  - Goal not met; continue with goal    2. Pack healthy snacks when away from home (gym)  - Progressing towards goal; continue with goal    3. Reduce portion sizes to one portion (measure food portions)  - Progressing towards goal, continue with goal    4. Limit calories to ~1500/day; 75g protein    5.  Pool exercises 2x week; chair exercises at home 3x week      Specific tips and techniques to facilitate compliance with above recommendations were provided and discussed. Pt was strongly encourage to begin making necessary changes now, and re-visit the dietitian in one month  If further details are desired please feel free to contact me at [phone]. This phone number was also provided to the patient for any further questions or concerns.             Lara Faye RD

## 2019-02-08 ENCOUNTER — HOSPITAL ENCOUNTER (OUTPATIENT)
Dept: NUTRITION | Age: 59
Discharge: HOME | End: 2019-02-08
Attending: INTERNAL MEDICINE
Payer: MEDICARE

## 2019-02-08 DIAGNOSIS — E66.9 OBESITY: ICD-10-CM

## 2019-02-08 PROCEDURE — 97802 MEDICAL NUTRITION INDIV IN: CPT | Performed by: DIETITIAN, REGISTERED

## 2019-02-08 NOTE — PROGRESS NOTES
[facility] - Follow Up Nutrition Counseling  2019    Referring Physician/Surgeon: Dr. Dagoberto Riggins  Name: Tima Webb : 1960 Age: 60 y.o. Gender: female  Reason for visit: Follow up obesity      Medications/Supplements:   Prior to Admission medications    Medication Sig Start Date End Date Taking? Authorizing Provider   HYDROcodone-acetaminophen (NORCO) 5-325 mg per tablet Take 1 Tab by mouth every eight (8) hours as needed for Pain. Max Daily Amount: 3 Tabs. 10/18/18   Nargis Byrne MD   cholecalciferol, vitamin D3, (VITAMIN D3) 2,000 unit tab Take 4,000 Units by mouth daily. Provider, Historical   acetaminophen (TYLENOL) 325 mg tablet Take 650 mg by mouth every four (4) hours as needed for Pain. Provider, Historical   levothyroxine (SYNTHROID) 25 mcg tablet Take 1 Tab by mouth Daily (before breakfast). Patient taking differently: Take 25 mcg by mouth nightly. 18   Pema Dey MD   [facility]) 75 mg tablet Take 25 mg by mouth daily. Provider, Historical   venlafaxine-SR (EFFEXOR XR) 150 mg capsule Take 150 mg by mouth daily. Provider, Historical   risperiDONE (RISPERDAL) 3 mg tablet Take 3 mg by mouth nightly. Provider, Historical   cpap machine kit by Does Not Apply route. Dx 327.23 7/13/15   LEONOR Mccullough   TRAZODONE HCL (TRAZODONE PO) Take 50 mg by mouth nightly as needed. Provider, Historical   lamoTRIgine (LAMICTAL) 100 mg tablet Take 200 mg by mouth nightly.     Provider, Historical       Pertinent Labs: No new labs      Anthropometrics:    Ht Readings from Last 1 Encounters:   18 5' 2\" (1.575 m)       Wt Readings from Last 10 Encounters:   18 119.3 kg (263 lb)   10/18/18 117.5 kg (259 lb)   10/10/18 117.5 kg (259 lb)   10/03/18 119.7 kg (264 lb)   18 120 kg (264 lb 9.6 oz)   18 121.9 kg (268 lb 11.2 oz)   10/27/17 122.2 kg (269 lb 4.8 oz)   10/18/17 124.3 kg (274 lb)   17 121.8 kg (268 lb 8 oz)   11/14/16 123.4 kg (272 lb)      Today's Wt: 241# IBW(219%IBW):110 lb BMI: 44.2           Category: Obesity Class III    Exercise/Physical Actvity:  None    Estimated Nutritional Needs:  3105-0948 kcal/day (MSJ x 1.2-1.3 - 750 to promote 1.5# wt loss per week); 75g protein (20% of recommended calories)    Reported Diet History:   24 Hour Diet Recall  Breakfast  Oatmeal, eggs or 2 eggs, sweet potato   Lunch 1pm Protein, veggie, starch   Dinner  Protein, veggies, starch   Snacks  fruit   Beverages  [place] , tea, water     Environmental/Social:  Pt lives alone in apartment; 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 Pt has lost 12# over past month; has been watching portion sizes and making wise food choices for meals. Pt states having obtained some \"meals\" from family and has frozen for later use. Pt has been planning out meals one week and at time and has found success with this. No exercise- pt indicated having stayed inside more reading. Pt plans on prepping bfast and lunches S-W nights, to be able to go to the pool M-Wed. Will also be doing chair exercises on non-pool days. Pt is in the action stage of change in relation to goals r/t food/meals; in the preparation stage of behavior change in relation to exercise. Will follow up in 4 weeks. PATIENT GOALS:  . Use plate method for meals. Plan Week 2 menus and shop accordingly  - Progressing toward goal; adjust goal to Plan 1 weeks worth of menus each week- do food inventory before meal planning     2.  Pack healthy snacks when away from home (gym)  - Progressing towards goal; continue with goal     3. Reduce portion sizes to one portion (measure food portions)  - Progressing towards goal, continue with goal     4. Pool exercises 2x week; chair exercises at home 3x week  - Goal not met, continue with goal.    5. Prep bfast and lunch on Sun-Wed night for the following day so able to go to gym after work    Specific tips and techniques to facilitate compliance with above recommendations were provided and discussed. Pt was strongly encourage to begin making necessary changes now, and re-visit the dietitian in 4 weeks. If further details are desired please feel free to contact me at [phone]. This phone number was also provided to the patient for any further questions or concerns.             Anjum Casanova RD

## 2019-03-28 ENCOUNTER — OFFICE VISIT (OUTPATIENT)
Dept: INTERNAL MEDICINE CLINIC | Age: 59
End: 2019-03-28

## 2019-03-28 ENCOUNTER — HOSPITAL ENCOUNTER (OUTPATIENT)
Dept: LAB | Age: 59
Discharge: HOME | End: 2019-03-28
Payer: MEDICARE

## 2019-03-28 VITALS
HEART RATE: 75 BPM | WEIGHT: 230.6 LBS | DIASTOLIC BLOOD PRESSURE: 81 MMHG | HEIGHT: 62 IN | OXYGEN SATURATION: 97 % | TEMPERATURE: 98.1 F | SYSTOLIC BLOOD PRESSURE: 111 MMHG | RESPIRATION RATE: 17 BRPM | BODY MASS INDEX: 42.44 KG/M2

## 2019-03-28 DIAGNOSIS — F31.4 BIPOLAR I DISORDER, MOST RECENT EPISODE DEPRESSED, SEVERE WITHOUT PSYCHOTIC FEATURES (HCC): ICD-10-CM

## 2019-03-28 DIAGNOSIS — G47.33 OSA (OBSTRUCTIVE SLEEP APNEA): Primary | ICD-10-CM

## 2019-03-28 DIAGNOSIS — E03.9 ACQUIRED HYPOTHYROIDISM: ICD-10-CM

## 2019-03-28 DIAGNOSIS — Z12.39 BREAST CANCER SCREENING: ICD-10-CM

## 2019-03-28 PROCEDURE — 84443 ASSAY THYROID STIM HORMONE: CPT

## 2019-03-28 PROCEDURE — 85025 COMPLETE CBC W/AUTO DIFF WBC: CPT

## 2019-03-28 PROCEDURE — 80061 LIPID PANEL: CPT

## 2019-03-28 PROCEDURE — 80053 COMPREHEN METABOLIC PANEL: CPT

## 2019-03-28 RX ORDER — LEVOTHYROXINE SODIUM 25 UG/1
25 TABLET ORAL
Qty: 90 TAB | Refills: 1 | Status: CANCELLED | OUTPATIENT
Start: 2019-03-28

## 2019-03-28 NOTE — PROGRESS NOTES
New Patient Evaluation Cara Lobato is a 60 y.o. female. They are here to establish care with the group and me as a primary care provider. she has seen Dr. Herb Angel in the past.  The last visit was May of 2018 She has a hsitory of NIKOLE. She has been on CPAP since 2007. She had seen Dr. Nicole Carter in the past.   
 
She has been working on losing weight. Lost 42 pounds since December. She sees NP at [facility] for [facility]. She is on lamictal and risperdone with Effexor. Uses Trazodone every night. Only dry mouth as an adverse effect. She notes that her Bipolar symptoms are controlled. Gallbladder out last fall. Stable since. She is due for colonoscopy and mammogram.  No previous abnormalities of either. Patient Active Problem List  
 Diagnosis Date Noted  Morbid obesity ([facility] [address] 04/19/2018  Symptomatic cholelithiasis 10/18/2017  DDD (degenerative disc disease), lumbosacral   
 OA (osteoarthritis)  GERD (gastroesophageal reflux disease) 08/06/2014  Vitamin D deficiency 09/24/2013  Hypothyroidism 02/11/2013  NIKOLE (obstructive sleep apnea) 05/07/2010  Bipolar I disorder, most recent episode depressed, severe without psychotic features ([facility] [address] 05/07/2010  Pure hypercholesterolemia 05/07/2010 Current Outpatient Medications Medication Sig Dispense Refill  cholecalciferol, vitamin D3, (VITAMIN D3) 2,000 unit tab Take 4,000 Units by mouth daily.  acetaminophen (TYLENOL) 325 mg tablet Take 650 mg by mouth every four (4) hours as needed for Pain.  levothyroxine (SYNTHROID) 25 mcg tablet Take 1 Tab by mouth Daily (before breakfast). (Patient taking differently: Take 25 mcg by mouth nightly.) 90 Tab 1  venlafaxine (EFFEXOR) 75 mg tablet Take 25 mg by mouth daily.  venlafaxine-SR (EFFEXOR XR) 150 mg capsule Take 150 mg by mouth daily.  risperiDONE (RISPERDAL) 3 mg tablet Take 3 mg by mouth nightly.  cpap machine kit by Does Not Apply route. Dx 327.23 1 Kit 0  
 TRAZODONE HCL (TRAZODONE PO) Take 50 mg by mouth nightly as needed.  lamoTRIgine (LAMICTAL) 100 mg tablet Take 200 mg by mouth nightly. Allergies Allergen Reactions  Flagyl [Metronidazole] Other (comments) Ams, HEADACHES AND DISTRESSED Past Medical History:  
Diagnosis Date  Anxiety and depression [employer]  Bipolar Disorder [employer]  DDD (degenerative disc disease), lumbosacral   
 L5-S1 ([facility])  Depression  Foot fracture, right   
 h/o  Genital herpes  GERD (gastroesophageal reflux disease)  History of gastritis Dr. Jennifer Lerma  Hyperlipidemia  Hypothyroidism (acquired)  OA (osteoarthritis)   
 knees, Dr. Eligio Leon  Obesity  NIKOLE on CPAP   
 adherent with CPAP use  Symptomatic cholelithiasis 10/18/2017 Past Surgical History:  
Procedure Laterality Date  HX COLONOSCOPY  12  
 colon polyp, repeat in 5 yrs, Dr. Isrrael Torres  HX ENDOSCOPY  16  
 gastritis, hiatal hernia (Dr. Jennifer Lerma)  HX LAP CHOLECYSTECTOMY  10/18/2018 Laparoscopic cholecystectomy by Dr. Saran Victor.  HX OTHER SURGICAL    
 cyst removed from scalp - benign Family History Problem Relation Age of Onset  Heart Disease Mother  Lung Disease Father  Diabetes Father  Heart Disease Father  COPD Father  Seizures Brother  Cancer Maternal Grandmother OVARIAN, COLON  
 Diabetes Paternal Uncle  Diabetes Paternal Grandfather  Colon Cancer Other   
     materal grandmothers siblings x 3-4  of colon cancer  Celiac Disease Brother  Heart Disease Maternal Grandfather  Anesth Problems Neg Hx Social History Tobacco Use  Smoking status: Never Smoker  Smokeless tobacco: Never Used Substance Use Topics  Alcohol use: No  
  Alcohol/week: 0.0 oz Health Maintenance Topic Date Due  Shingrix Vaccine Age 50> (1 of 2) 05/29/2009  DTaP/Tdap/Td series (1 - Tdap) 11/24/2010  BREAST CANCER SCRN MAMMOGRAM  05/12/2017  COLONOSCOPY  09/20/2017  PAP AKA CERVICAL CYTOLOGY  05/12/2018  MEDICARE YEARLY EXAM  06/28/2018  Influenza Age 5 to Adult  08/01/2018  Hepatitis C Screening  Completed  Pneumococcal 0-64 years  Aged Out Review of Systems Constitutional: Negative. Respiratory: Negative. Cardiovascular: Negative. Genitourinary: Negative. Visit Vitals /81 (BP 1 Location: Right arm, BP Patient Position: Sitting) Pulse 75 Temp 98.1 °F (36.7 °C) (Oral) Resp 17 Ht 5' 2\" (1.575 m) Wt 230 lb 9.6 oz (104.6 kg) SpO2 97% BMI 42.18 kg/m² Physical Exam  
Constitutional: No distress. Cardiovascular: Normal rate and regular rhythm. No murmur heard. Pulmonary/Chest: Effort normal and breath sounds normal.  
 
 
 
 
ASSESSMENT/PLAN Diagnoses and all orders for this visit: 
 
1. NIKOLE (obstructive sleep apnea) -     AMB SUPPLY ORDER 
-     REFERRAL TO SLEEP STUDIES 2. Acquired hypothyroidism 
-     CBC WITH AUTOMATED DIFF 
-     LIPID PANEL 
-     METABOLIC PANEL, COMPREHENSIVE 
-     TSH 3RD GENERATION 3. Breast cancer screening -     [facility] MAMMO BI SCREENING INCL CAD; Future 4. Bipolar I disorder, most recent episode depressed, severe without psychotic features ([facility] [address] She will return in May for a Medicare Wellness visit. .  
 
 
-Discussed with the patient to continue the current plan of care. We will obtain baseline labwork and determine if any adjustments need to be done. We will also await the records of the previous PCP to ascertain further details of the patient's history. The patient agrees with and understands the plan of care. All questions have been answered.

## 2019-03-29 ENCOUNTER — TELEPHONE (OUTPATIENT)
Dept: INTERNAL MEDICINE CLINIC | Age: 59
End: 2019-03-29

## 2019-03-29 LAB
ALBUMIN SERPL-MCNC: 4.6 G/DL (ref 3.5–5.5)
ALBUMIN/GLOB SERPL: 2.3 {RATIO} (ref 1.2–2.2)
ALP SERPL-CCNC: 96 IU/L (ref 39–117)
ALT SERPL-CCNC: 19 IU/L (ref 0–32)
AST SERPL-CCNC: 13 IU/L (ref 0–40)
BASOPHILS # BLD AUTO: 0 X10E3/UL (ref 0–0.2)
BASOPHILS NFR BLD AUTO: 0 %
BILIRUB SERPL-MCNC: 0.4 MG/DL (ref 0–1.2)
BUN SERPL-MCNC: 13 MG/DL (ref 6–24)
BUN/CREAT SERPL: 15 (ref 9–23)
CALCIUM SERPL-MCNC: 9.3 MG/DL (ref 8.7–10.2)
CHLORIDE SERPL-SCNC: 101 MMOL/L (ref 96–106)
CHOLEST SERPL-MCNC: 179 MG/DL (ref 100–199)
CO2 SERPL-SCNC: 26 MMOL/L (ref 20–29)
CREAT SERPL-MCNC: 0.87 MG/DL (ref 0.57–1)
EOSINOPHIL # BLD AUTO: 0.1 X10E3/UL (ref 0–0.4)
EOSINOPHIL NFR BLD AUTO: 1 %
ERYTHROCYTE [DISTWIDTH] IN BLOOD BY AUTOMATED COUNT: 14.8 % (ref 12.3–15.4)
GLOBULIN SER CALC-MCNC: 2 G/DL (ref 1.5–4.5)
GLUCOSE SERPL-MCNC: 102 MG/DL (ref 65–99)
HCT VFR BLD AUTO: 42.6 % (ref 34–46.6)
HDLC SERPL-MCNC: 34 MG/DL
HGB BLD-MCNC: 14.5 G/DL (ref 11.1–15.9)
IMM GRANULOCYTES # BLD AUTO: 0 X10E3/UL (ref 0–0.1)
IMM GRANULOCYTES NFR BLD AUTO: 0 %
LDLC SERPL CALC-MCNC: 120 MG/DL (ref 0–99)
LYMPHOCYTES # BLD AUTO: 1.7 X10E3/UL (ref 0.7–3.1)
LYMPHOCYTES NFR BLD AUTO: 24 %
MCH RBC QN AUTO: 29.7 PG (ref 26.6–33)
MCHC RBC AUTO-ENTMCNC: 34 G/DL (ref 31.5–35.7)
MCV RBC AUTO: 87 FL (ref 79–97)
MONOCYTES # BLD AUTO: 0.6 X10E3/UL (ref 0.1–0.9)
MONOCYTES NFR BLD AUTO: 8 %
NEUTROPHILS # BLD AUTO: 4.6 X10E3/UL (ref 1.4–7)
NEUTROPHILS NFR BLD AUTO: 67 %
PLATELET # BLD AUTO: 180 X10E3/UL (ref 150–379)
POTASSIUM SERPL-SCNC: 4.2 MMOL/L (ref 3.5–5.2)
PROT SERPL-MCNC: 6.6 G/DL (ref 6–8.5)
RBC # BLD AUTO: 4.88 X10E6/UL (ref 3.77–5.28)
SODIUM SERPL-SCNC: 141 MMOL/L (ref 134–144)
TRIGL SERPL-MCNC: 127 MG/DL (ref 0–149)
TSH SERPL DL<=0.005 MIU/L-ACNC: 3.4 UIU/ML (ref 0.45–4.5)
VLDLC SERPL CALC-MCNC: 25 MG/DL (ref 5–40)
WBC # BLD AUTO: 6.9 X10E3/UL (ref 3.4–10.8)

## 2019-03-29 NOTE — TELEPHONE ENCOUNTER
Pt calling about her C-PAP machine supplies - Script has not been sent, needs proper codes. Visit notes with \"that Pt will get continued use and benefit from c-pap machine\"- for Medicare -  script were not sent and need to be re-faxed.     [facility] -[phone]  Attn: [place]  Phone:  [phone]  [facility] will be faxing another form to our office    Pt [phone]

## 2019-04-03 NOTE — TELEPHONE ENCOUNTER
Pt called on Friday - has a torn CPAP mask. Company has not heard back from  so they can send out new mask.   ADVISE what Pt to do!  [phone]

## 2019-04-05 NOTE — TELEPHONE ENCOUNTER
Spoke with Carlos Enrique Martins with [address], asked to have forms for CPap supplies faxed to [phone] to be signed by provider.

## 2019-04-08 ENCOUNTER — TELEPHONE (OUTPATIENT)
Dept: INTERNAL MEDICINE CLINIC | Age: 59
End: 2019-04-08

## 2019-04-08 NOTE — TELEPHONE ENCOUNTER
[facility] [phone]     Verified w/ Marsha Livers we have forms & they're awaiting dr. Rubina Bautista.   Vidya Cowan gave preferred fax # of [phone] ATTN:  Vidya Cowan

## 2019-04-09 ENCOUNTER — HOSPITAL ENCOUNTER (OUTPATIENT)
Dept: NUTRITION | Age: 59
Discharge: HOME | End: 2019-04-09
Payer: MEDICARE

## 2019-04-09 DIAGNOSIS — E66.9 OBESITY: ICD-10-CM

## 2019-04-09 PROCEDURE — 97803 MED NUTRITION INDIV SUBSEQ: CPT | Performed by: DIETITIAN, REGISTERED

## 2019-04-09 NOTE — PROGRESS NOTES
NOEMI Perkins 37 - Follow Up Nutrition Counseling  2019    Referring Physician/Surgeon: Dr. Roseanne Castellano  Name: Christophe Gill : 1960 Age: 60 y.o. Gender: female  Reason for visit: Obesity follow up      Medications/Supplements:   Prior to Admission medications    Medication Sig Start Date End Date Taking? Authorizing Provider   cholecalciferol, vitamin D3, (VITAMIN D3) 2,000 unit tab Take 4,000 Units by mouth daily. Provider, Historical   acetaminophen (TYLENOL) 325 mg tablet Take 650 mg by mouth every four (4) hours as needed for Pain. Provider, Historical   levothyroxine (SYNTHROID) 25 mcg tablet Take 1 Tab by mouth Daily (before breakfast). Patient taking differently: Take 25 mcg by mouth nightly. 18   Amara Lizarraga MD   [facility]) 75 mg tablet Take 25 mg by mouth daily. Provider, Historical   venlafaxine-SR (EFFEXOR XR) 150 mg capsule Take 150 mg by mouth daily. Provider, Historical   risperiDONE (RISPERDAL) 3 mg tablet Take 3 mg by mouth nightly. Provider, Historical   cpap machine kit by Does Not Apply route. Dx 327.23 7/13/15   LEONOR Narvaez   TRAZODONE HCL (TRAZODONE PO) Take 50 mg by mouth nightly as needed. Provider, Historical   lamoTRIgine (LAMICTAL) 100 mg tablet Take 200 mg by mouth nightly.     Provider, Historical       Pertinent Labs: Glucose 102, HDL 34,  (3/28/2019)      Anthropometrics:    Ht Readings from Last 1 Encounters:   19 5' 2\" (1.575 m)       Wt Readings from Last 10 Encounters:   19 104.6 kg (230 lb 9.6 oz)   18 119.3 kg (263 lb)   10/18/18 117.5 kg (259 lb)   10/10/18 117.5 kg (259 lb)   10/03/18 119.7 kg (264 lb)   18 120 kg (264 lb 9.6 oz)   18 121.9 kg (268 lb 11.2 oz)   10/27/17 122.2 kg (269 lb 4.8 oz)   10/18/17 124.3 kg (274 lb)   17 121.8 kg (268 lb 8 oz)      Today's Wt: 231# (105 kg) IBW(210%IBW): 110 lb BMI: 42.3  Category: Obesity Class III      Exercise/Physical Actvity:  None    Estimated Nutritional Needs:  7712-2750 kcal/day (MSJ x 1.2-1.3 - 750 to promote 1.5# wt loss per week); 75g protein (20% of recommended calories)      Reported Diet History:   24 Hour Diet Recall  Breakfast  Oatmeal or grits, eggs or smoothie (2c milk, cottage cheese, banana)   Lunch  Protein, veggie, starch   Dinner  Protein, veggie, starch   Snacks  fruit   Beverages  [place], tea, water       NUTRITION DIAGNOSIS:  Obesity r/t excessive caloric intake, lack of planning meals, emotional eating, lack of exercise as evidenced by pt with BMI of 44      NUTRITION ASSESSMENT/ INTERVENTION:  Pt seen for obesity follow up. Pt has lost 10# in past 2 months; has been planning/prepping meals and watching portion sizes. States recently (past 2 weeks) not planning meals as well but will restart today. Pt has not been exercising- indicates mood is playing a factor. Would like to start adding chair exercises once per week (plans on talking with a friend who does personal training for chair exercises for the abdomen). Talking with individual from [employer] who suggested pt write down feelings and listen to motivational tapes; feels like conversations with this person are beneficial.     Pt appears to be in the action stage of change in relation to goals r/t food/meals; in the contemplative stage of change in relation to exercise. PATIENT GOALS:  1. Use plate method for meals. Plan Week 1 menus and shop accordingly  - Progressing toward goal; continue with goal    2. Pack healthy snacks when away from home (gym)  - Goal not met; discontinue goal     3. Reduce portion sizes to one portion (measure food portions)  - Progressing towards goal, continue with goal     4. Pool exercises 2x week; chair exercises at home 3x week. Adjust goal to chair exercises once per week  - Goal not met, continue with goal with adjustment     5.  Prep bfast and lunch on Sun-Wed night for the following day so able to go to gym after work  - Progressing towards goal; continue with goal    Specific tips and techniques to facilitate compliance with above recommendations were provided and discussed. Pt was strongly encourage to begin making necessary changes now, and re-visit the dietitian prn. If further details are desired please feel free to contact me at [phone]. This phone number was also provided to the patient for any further questions or concerns.             Mayra Rodriguez RD

## 2019-04-11 ENCOUNTER — DOCUMENTATION ONLY (OUTPATIENT)
Dept: INTERNAL MEDICINE CLINIC | Age: 59
End: 2019-04-11

## 2019-04-15 ENCOUNTER — HOSPITAL ENCOUNTER (OUTPATIENT)
Dept: MAMMOGRAPHY | Age: 59
Discharge: HOME | End: 2019-04-15
Attending: INTERNAL MEDICINE
Payer: MEDICARE

## 2019-04-15 DIAGNOSIS — Z12.31 VISIT FOR SCREENING MAMMOGRAM: ICD-10-CM

## 2019-04-15 PROCEDURE — 77063 BREAST TOMOSYNTHESIS BI: CPT

## 2019-05-29 ENCOUNTER — OFFICE VISIT (OUTPATIENT)
Dept: INTERNAL MEDICINE CLINIC | Age: 59
End: 2019-05-29

## 2019-05-29 VITALS
OXYGEN SATURATION: 98 % | DIASTOLIC BLOOD PRESSURE: 80 MMHG | HEART RATE: 68 BPM | TEMPERATURE: 98 F | BODY MASS INDEX: 40.15 KG/M2 | RESPIRATION RATE: 18 BRPM | SYSTOLIC BLOOD PRESSURE: 110 MMHG | WEIGHT: 218.2 LBS | HEIGHT: 62 IN

## 2019-05-29 DIAGNOSIS — E03.9 ACQUIRED HYPOTHYROIDISM: ICD-10-CM

## 2019-05-29 DIAGNOSIS — M89.9 DISORDER OF BONE AND CARTILAGE: ICD-10-CM

## 2019-05-29 DIAGNOSIS — M94.9 DISORDER OF BONE AND CARTILAGE: ICD-10-CM

## 2019-05-29 DIAGNOSIS — M51.37 DDD (DEGENERATIVE DISC DISEASE), LUMBOSACRAL: ICD-10-CM

## 2019-05-29 DIAGNOSIS — Z00.00 MEDICARE ANNUAL WELLNESS VISIT, SUBSEQUENT: Primary | ICD-10-CM

## 2019-05-29 DIAGNOSIS — F31.4 BIPOLAR I DISORDER, MOST RECENT EPISODE DEPRESSED, SEVERE WITHOUT PSYCHOTIC FEATURES (HCC): ICD-10-CM

## 2019-05-29 DIAGNOSIS — Z23 ENCOUNTER FOR IMMUNIZATION: ICD-10-CM

## 2019-05-29 DIAGNOSIS — E78.00 PURE HYPERCHOLESTEROLEMIA: ICD-10-CM

## 2019-05-29 DIAGNOSIS — G47.33 OSA (OBSTRUCTIVE SLEEP APNEA): ICD-10-CM

## 2019-05-29 DIAGNOSIS — E66.01 MORBID OBESITY (HCC): ICD-10-CM

## 2019-05-29 RX ORDER — LEVOTHYROXINE SODIUM 25 UG/1
25 TABLET ORAL
Qty: 90 TAB | Refills: 1 | Status: SHIPPED | OUTPATIENT
Start: 2019-05-29 | End: 2020-02-07 | Stop reason: SDUPTHER

## 2019-05-29 NOTE — PROGRESS NOTES
This is the Subsequent Medicare Annual Wellness Exam, performed 12 months or more after the Initial AWV or the last Subsequent AWV    I have reviewed the patient's medical history in detail and updated the computerized patient record. She reports feeling well but tired as she was not able to sleep well last night. Reviewed labs, stable thyroid, cholesterol. Overdue for colonoscopy--due in 2017. She will call Dr. Zuleima Winslow for follow up. She has seen Gyn in April (Dr. Kassidy Roberts). All was normal.  No issues. Will see Dr. Aime Sandoval, has bilateral painful bunions. She has seen her previously. Considering a procedure    Discussed shingles vaccine, she is not interested today but will consider. History     Past Medical History:   Diagnosis Date    Anxiety and depression     [facility]    Bipolar Disorder     [facility]    DDD (degenerative disc disease), lumbosacral     L5-S1 ([facility])    Depression     Foot fracture, right     h/o    Genital herpes     GERD (gastroesophageal reflux disease)     History of gastritis     Dr. Neftaly Pryor Hyperlipidemia     Hypothyroidism (acquired)     Menopause     LMP- 51years old?  OA (osteoarthritis)     knees, Dr. Severiano Fontaine    Obesity     NIKOLE on CPAP 2007    adherent with CPAP use    Symptomatic cholelithiasis 10/18/2017      Past Surgical History:   Procedure Laterality Date    HX COLONOSCOPY  9/20/12    colon polyp, repeat in 5 yrs, Dr. Selvin Darden HX ENDOSCOPY  5/19/16    gastritis, hiatal hernia (Dr. Timothy Nuñez)    HX LAP CHOLECYSTECTOMY  10/18/2018    Laparoscopic cholecystectomy by Dr. Ivan Knight.  HX OTHER SURGICAL      cyst removed from scalp - benign     Current Outpatient Medications   Medication Sig Dispense Refill    cholecalciferol, vitamin D3, (VITAMIN D3) 2,000 unit tab Take 4,000 Units by mouth daily.       acetaminophen (TYLENOL) 325 mg tablet Take 650 mg by mouth every four (4) hours as needed for Pain.  levothyroxine (SYNTHROID) 25 mcg tablet Take 1 Tab by mouth Daily (before breakfast). (Patient taking differently: Take 25 mcg by mouth nightly.) 90 Tab 1    venlafaxine (EFFEXOR) 75 mg tablet Take 25 mg by mouth daily.  venlafaxine-SR (EFFEXOR XR) 150 mg capsule Take 150 mg by mouth daily.  risperiDONE (RISPERDAL) 3 mg tablet Take 3 mg by mouth nightly.  cpap machine kit by Does Not Apply route. Dx 327.23 1 Kit 0    TRAZODONE HCL (TRAZODONE PO) Take 50 mg by mouth nightly as needed.  lamoTRIgine (LAMICTAL) 100 mg tablet Take 200 mg by mouth nightly.        Allergies   Allergen Reactions    Flagyl [Metronidazole] Other (comments)     Ams, HEADACHES AND DISTRESSED     Family History   Problem Relation Age of Onset    Heart Disease Mother     Lung Disease Father     Diabetes Father     Heart Disease Father     COPD Father     Seizures Brother     Cancer Maternal Grandmother         OVARIAN, COLON    Diabetes Paternal Uncle     Diabetes Paternal Grandfather     Colon Cancer Other         materal grandmothers siblings x 3-4  of colon cancer    Celiac Disease Brother     Heart Disease Maternal Grandfather     Anesth Problems Neg Hx      Social History     Tobacco Use    Smoking status: Never Smoker    Smokeless tobacco: Never Used   Substance Use Topics    Alcohol use: No     Alcohol/week: 0.0 oz     Patient Active Problem List   Diagnosis Code    NIKOLE (obstructive sleep apnea) G47.33    Bipolar I disorder, most recent episode depressed, severe without psychotic features ([facility] [address] F31.4    Pure hypercholesterolemia E78.00    Hypothyroidism E03.9    Vitamin D deficiency E55.9    GERD (gastroesophageal reflux disease) K21.9    OA (osteoarthritis) M19.90    DDD (degenerative disc disease), lumbosacral M51.37    Symptomatic cholelithiasis K80.20    Morbid obesity ([facility] [address] E66.01       Depression Risk Factor Screening:     3 most recent PHQ Screens 3/28/2019   Little interest or pleasure in doing things More than half the days   Feeling down, depressed, irritable, or hopeless More than half the days   Total Score PHQ 2 4   Trouble falling or staying asleep, or sleeping too much Several days   Feeling tired or having little energy Several days   Poor appetite, weight loss, or overeating Not at all   Feeling bad about yourself - or that you are a failure or have let yourself or your family down Several days   Trouble concentrating on things such as school, work, reading, or watching TV Several days   Moving or speaking so slowly that other people could have noticed; or the opposite being so fidgety that others notice Not at all   Thoughts of being better off dead, or hurting yourself in some way Not at all   PHQ 9 Score 8   How difficult have these problems made it for you to do your work, take care of your home and get along with others Somewhat difficult     Alcohol Risk Factor Screening: You do not drink alcohol or very rarely. Functional Ability and Level of Safety:   Hearing Loss  She has some mild hearing loss--not that bad per the patient. Activities of Daily Living  The home contains: no safety equipment. Patient does total self care    Fall Risk  Fall Risk Assessment, last 12 mths 5/29/2019   Able to walk? Yes   Fall in past 12 months?  No       Abuse Screen  Patient is not abused    Cognitive Screening   Evaluation of Cognitive Function:  Has your family/caregiver stated any concerns about your memory: no  Normal    Patient Care Team   Patient Care Team:  Dayana Ridley MD as PCP - General (Internal Medicine)  Javad Thompson MD (Gastroenterology)  Madelyn Sanchez MD (Ophthalmology)  Al Navarro MD (Obstetrics & Gynecology)  Emma Collet, MD as Surgeon (General Surgery)  Hardeep Mercado NP as Nurse Practitioner (General Surgery)    Assessment/Plan   Education and counseling provided:  Are appropriate based on today's review and evaluation    Diagnoses and all orders for this visit:    1. Medicare annual wellness visit, subsequent    2. Acquired hypothyroidism  -     levothyroxine (SYNTHROID) 25 mcg tablet; Take 1 Tab by mouth nightly. 3. Disorder of bone and cartilage  -     DEXA BONE DENSITY STUDY AXIAL; Future    4. Encounter for immunization  -     varicella-zoster recombinant, PF, (SHINGRIX, PF,) 50 mcg/0.5 mL susr injection; 0.5 mL by IntraMUSCular route once for 1 dose. 5. Morbid obesity ([address]    6. NIKOLE (obstructive sleep apnea)    7. Bipolar I disorder, most recent episode depressed, severe without psychotic features ([address]    8. DDD (degenerative disc disease), lumbosacral    9. Pure hypercholesterolemia        Health Maintenance Due   Topic Date Due    Shingrix Vaccine Age 49> (1 of 2) 05/29/2009    DTaP/Tdap/Td series (1 - Tdap) 11/24/2010    COLONOSCOPY  09/20/2017    PAP AKA CERVICAL CYTOLOGY  05/12/2018    MEDICARE YEARLY EXAM  06/28/2018     Follow-up and Dispositions    · Return in about 6 months (around 11/29/2019), or if symptoms worsen or fail to improve.

## 2019-05-29 NOTE — PATIENT INSTRUCTIONS
Medicare Wellness Visit, Female The best way to live healthy is to have a lifestyle where you eat a well-balanced diet, exercise regularly, limit alcohol use, and quit all forms of tobacco/nicotine, if applicable. Regular preventive services are another way to keep healthy. Preventive services (vaccines, screening tests, monitoring & exams) can help personalize your care plan, which helps you manage your own care. Screening tests can find health problems at the earliest stages, when they are easiest to treat. [facility] follows the current, evidence-based guidelines published by the [facility] Garret [facility] ([facility]) when recommending preventive services for our patients. Because we follow these guidelines, sometimes recommendations change over time as research supports it. (For example, mammograms used to be recommended annually. Even though Medicare will still pay for an annual mammogram, the newer guidelines recommend a mammogram every two years for women of average risk.) Of course, you and your doctor may decide to screen more often for some diseases, based on your risk and your health status. Preventive services for you include: - Medicare offers their members a free annual wellness visit, which is time for you and your primary care provider to discuss and plan for your preventive service needs. Take advantage of this benefit every year! 
-All adults over the age of 72 should receive the recommended pneumonia vaccines. Current USPSTF guidelines recommend a series of two vaccines for the best pneumonia protection.  
-All adults should have a flu vaccine yearly and a tetanus vaccine every 10 years. All adults age 61 and older should receive a shingles vaccine once in their lifetime.   
-A bone mass density test is recommended when a woman turns 65 to screen for osteoporosis. This test is only recommended one time, as a screening. Some providers will use this same test as a disease monitoring tool if you already have osteoporosis. -All adults age 38-68 who are overweight should have a diabetes screening test once every three years.  
-Other screening tests and preventive services for persons with diabetes include: an eye exam to screen for diabetic retinopathy, a kidney function test, a foot exam, and stricter control over your cholesterol.  
-Cardiovascular screening for adults with routine risk involves an electrocardiogram (ECG) at intervals determined by your doctor.  
-Colorectal cancer screenings should be done for adults age 54-65 with no increased risk factors for colorectal cancer. There are a number of acceptable methods of screening for this type of cancer. Each test has its own benefits and drawbacks. Discuss with your doctor what is most appropriate for you during your annual wellness visit. The different tests include: colonoscopy (considered the best screening method), a fecal occult blood test, a fecal DNA test, and sigmoidoscopy. -Breast cancer screenings are recommended every other year for women of normal risk, age 54-69. 
-Cervical cancer screenings for women over age 72 are only recommended with certain risk factors.  
-All adults born between [facility] should be screened once for Hepatitis C. Here is a list of your current Health Maintenance items (your personalized list of preventive services) with a due date: 
Health Maintenance Due Topic Date Due  Shingles Vaccine (1 of 2) 05/29/2009  DTaP/Tdap/Td  (1 - Tdap) 11/24/2010  Colonoscopy  09/20/2017  Pap Test  05/12/2018 [facility] Annual Well Visit  06/28/2018

## 2019-06-07 ENCOUNTER — TELEPHONE (OUTPATIENT)
Dept: CARDIAC REHAB | Age: 59
End: 2019-06-07

## 2019-06-07 NOTE — TELEPHONE ENCOUNTER
6/7/2019 Cardiac Rehab: Called Ms. Edita Ingram to reschedule her nutrition appointment (6/14/19) and make her aware of the insurance coverage. Left voicemail message.  Jose Pitts

## 2019-06-11 ENCOUNTER — TELEPHONE (OUTPATIENT)
Dept: CARDIAC REHAB | Age: 59
End: 2019-06-11

## 2019-06-11 NOTE — TELEPHONE ENCOUNTER
6/11/2019 Cardiac Wellness: Called Ms. Kristi Guzmán to reschedule her appointment that is scheduled for 6/14/19. Left message with her mother, which I called her work number and her mother answered the phone. She said she will pass the message on to [facility] to call us. Sean Gordon    6/7/2019 Cardiac Rehab: Called Ms. Kristi Guzmán to reschedule her nutrition appointment (6/14/19) and make her aware of the insurance coverage. Left voicemail message.  Sean Gordon          Electronically signed by Yasmin Rivera at 06/07/19 [id number]

## 2019-06-14 ENCOUNTER — APPOINTMENT (OUTPATIENT)
Dept: CARDIAC REHAB | Age: 59
End: 2019-06-14
Attending: INTERNAL MEDICINE

## 2019-07-16 ENCOUNTER — HOSPITAL ENCOUNTER (OUTPATIENT)
Dept: MAMMOGRAPHY | Age: 59
Discharge: HOME | End: 2019-07-16
Attending: INTERNAL MEDICINE
Payer: MEDICARE

## 2019-07-16 DIAGNOSIS — M94.9 DISORDER OF BONE AND CARTILAGE: ICD-10-CM

## 2019-07-16 DIAGNOSIS — M89.9 DISORDER OF BONE AND CARTILAGE: ICD-10-CM

## 2019-07-16 PROCEDURE — 77080 DXA BONE DENSITY AXIAL: CPT

## 2019-09-17 ENCOUNTER — HOSPITAL ENCOUNTER (OUTPATIENT)
Dept: NUTRITION | Age: 59
Discharge: HOME | End: 2019-09-17
Attending: INTERNAL MEDICINE
Payer: MEDICARE

## 2019-09-17 VITALS — BODY MASS INDEX: 39.75 KG/M2 | HEIGHT: 62 IN | WEIGHT: 216 LBS

## 2019-09-17 DIAGNOSIS — E66.9 OBESITY: ICD-10-CM

## 2019-09-17 PROCEDURE — 97802 MEDICAL NUTRITION INDIV IN: CPT

## 2019-09-17 NOTE — PROGRESS NOTES
NOEMI Perkins 37 - Follow Up Nutrition Counseling  2019    Referring Physician/Surgeon:  Name: Antonia Rahman : 1960 Age: 60 y.o. Gender: female  Reason for visit:    ASSESSMENT:    NUTRITION DIAGNOSIS:  Obesity r/t excessive caloric intake, lack of planning meals, emotional eating, lack of exercise as evidenced by pt with BMI of 39        NUTRITION ASSESSMENT/ INTERVENTION:  Pt last seen in 2019-  Pt seen for obesity follow up. Pt has lost 14# in past 6 months. Pt had been doing well for a while with meal planning/prepping meals and watching portion sizes. She then had a hospital admission in early August (mental health) and has not been doing as well since then. Pt states she weighed 208 lbs in early August prior to hospital stay. Pt states her sponsor \"fired\" her fom overeaters anonymous. She had not been telling sponsor when she would change intakes from planned meals. Pt is part of [employer] and states she will start exercising this week. Pt works 9-12 M-W, - and should theoretically have plenty of time to meal prep. Pt spends \"hours and hours\" reading mystery novels. Encouraged pt to use 1 hr a day for meal prepping for meals for the next day as she believes this works best for her.     PATIENT GOALS:  1. Use plate method for meals. Plan Week 1 menus and shop accordingly  - Progressing toward goal; continue with goal     2. Reduce portion sizes to one portion (measure food portions)  - Progressing towards goal, continue with goal     3. Pool exercises 1x week; chair exercises at home 2x week. - Goal not met, continue with goal with adjustment     4. Prep bfast and lunch on Sun-Wed night for the following day   - Progressing towards goal; continue with goal     Specific tips and techniques to facilitate compliance with above recommendations were provided and discussed.   Pt was strongly encourage to begin making necessary changes now, and re-visit the dietitian 10/15/19. Pt wanted to set up follow up for 1 month from today's date. If further details are desired please feel free to contact me at [phone]. This phone number was also provided to the patient for any further questions or concerns. Medications/Supplements:   Prior to Admission medications    Medication Sig Start Date End Date Taking? Authorizing Provider   levothyroxine (SYNTHROID) 25 mcg tablet Take 1 Tab by mouth nightly. 5/29/19   Waqar Dallas MD   cholecalciferol, vitamin D3, (VITAMIN D3) 2,000 unit tab Take 4,000 Units by mouth daily. Provider, Historical   acetaminophen (TYLENOL) 325 mg tablet Take 650 mg by mouth every four (4) hours as needed for Pain. Provider, Historical   venlafaxine (EFFEXOR) 75 mg tablet Take 25 mg by mouth daily. Provider, Historical   venlafaxine-SR (EFFEXOR XR) 150 mg capsule Take 150 mg by mouth daily. Provider, Historical   risperiDONE (RISPERDAL) 3 mg tablet Take 3 mg by mouth nightly. Provider, Historical   cpap machine kit by Does Not Apply route. Dx 327.23 7/13/15   LEONOR Valdez   TRAZODONE HCL (TRAZODONE PO) Take 50 mg by mouth nightly as needed. Provider, Historical   lamoTRIgine (LAMICTAL) 100 mg tablet Take 200 mg by mouth nightly. Provider, Historical         Anthropometrics:    Ht Readings from Last 1 Encounters:   05/29/19 5' 2\" (1.575 m)       Wt Readings from Last 10 Encounters:   09/17/19 98 kg (216 lb)   05/29/19 99 kg (218 lb 3.2 oz)   03/28/19 104.6 kg (230 lb 9.6 oz)   11/05/18 119.3 kg (263 lb)   10/18/18 117.5 kg (259 lb)   10/10/18 117.5 kg (259 lb)   10/03/18 119.7 kg (264 lb)   05/03/18 120 kg (264 lb 9.6 oz)   04/19/18 121.9 kg (268 lb 11.2 oz)   10/27/17 122.2 kg (269 lb 4.8 oz)      Today's Wt: 216 lbs IBW(%IBW):  110 lbs  Body mass index is 39.51 kg/m².  Category: Obese      Exercise/Physical Actvity: none     Estimated Nutritional Needs: 1400 calories for weight loss of 1-2 lbs per week.     Reported Diet History:      24 Hour Diet Recall  Breakfast  2 pamela bars   Lunch  Chicken heath with 1 cup brown rice   Dinner  Large smoothie or spinach salad   Snacks     Beverages  Diet soda,      Environmental/Social: lives alone. Visits mother every weekend. Specific tips and techniques to facilitate compliance with above recommendations were provided and discussed. Pt was strongly encourage to begin making necessary changes now, and re-visit the dietitian prn. If further details are desired please feel free to contact me at [phone]. This phone number was also provided to the patient for any further questions or concerns.             Aubree Tobar

## 2019-09-30 ENCOUNTER — OFFICE VISIT (OUTPATIENT)
Dept: INTERNAL MEDICINE CLINIC | Age: 59
End: 2019-09-30

## 2019-09-30 VITALS
OXYGEN SATURATION: 95 % | TEMPERATURE: 98.7 F | BODY MASS INDEX: 39.93 KG/M2 | HEART RATE: 86 BPM | WEIGHT: 217 LBS | SYSTOLIC BLOOD PRESSURE: 120 MMHG | DIASTOLIC BLOOD PRESSURE: 80 MMHG | RESPIRATION RATE: 18 BRPM | HEIGHT: 62 IN

## 2019-09-30 DIAGNOSIS — H60.333 ACUTE SWIMMER'S EAR OF BOTH SIDES: Primary | ICD-10-CM

## 2019-09-30 RX ORDER — CIPROFLOXACIN AND DEXAMETHASONE 3; 1 MG/ML; MG/ML
4 SUSPENSION/ DROPS AURICULAR (OTIC) 2 TIMES DAILY
Qty: 7.5 ML | Refills: 0 | Status: SHIPPED | OUTPATIENT
Start: 2019-09-30 | End: 2020-05-20 | Stop reason: ALTCHOICE

## 2019-09-30 NOTE — PATIENT INSTRUCTIONS

## 2019-09-30 NOTE — PROGRESS NOTES
Acute Care Note    Irina Proctor is 60 y.o. female. she presents for evaluation of Ear Pain and Ear Drainage    The patient has noted clear drainage from the left ear canal.  This has been present for a week. She noted that this improved slightly and then worsened. Denies fever. She also has some pain that she has noted in the right ear. Prior to Admission medications    Medication Sig Start Date End Date Taking? Authorizing Provider   levothyroxine (SYNTHROID) 25 mcg tablet Take 1 Tab by mouth nightly. 5/29/19  Yes George Longoria MD   cholecalciferol, vitamin D3, (VITAMIN D3) 2,000 unit tab Take 4,000 Units by mouth daily. Yes Provider, Historical   acetaminophen (TYLENOL) 325 mg tablet Take 650 mg by mouth every four (4) hours as needed for Pain. Yes Provider, Historical   venlafaxine (EFFEXOR) 75 mg tablet Take 25 mg by mouth daily. Yes Provider, Historical   venlafaxine-SR (EFFEXOR XR) 150 mg capsule Take 150 mg by mouth daily. Yes Provider, Historical   risperiDONE (RISPERDAL) 3 mg tablet Take 3 mg by mouth nightly. Yes Provider, Historical   cpap machine kit by Does Not Apply route. Dx 327.23 7/13/15  Yes Margaret Jaime PA   TRAZODONE HCL (TRAZODONE PO) Take 50 mg by mouth nightly as needed. Yes Provider, Historical   lamoTRIgine (LAMICTAL) 100 mg tablet Take 200 mg by mouth nightly. Yes Provider, Historical         Patient Active Problem List   Diagnosis Code    NIKOLE (obstructive sleep apnea) G47.33    Bipolar I disorder, most recent episode depressed, severe without psychotic features ([facility] [address] F31.4    Pure hypercholesterolemia E78.00    Hypothyroidism E03.9    Vitamin D deficiency E55.9    GERD (gastroesophageal reflux disease) K21.9    OA (osteoarthritis) M19.90    DDD (degenerative disc disease), lumbosacral M51.37    Symptomatic cholelithiasis K80.20    Morbid obesity ([address] E66.01         Review of Systems   Constitutional: Negative.     Respiratory: Negative. Cardiovascular: Negative. Gastrointestinal: Negative. Visit Vitals  /80 (BP 1 Location: Left arm, BP Patient Position: Sitting)   Pulse 86   Temp 98.7 °F (37.1 °C) (Oral)   Resp 18   Ht 5' 2\" (1.575 m)   Wt 217 lb (98.4 kg)   SpO2 95%   BMI 39.69 kg/m²       Physical Exam   HENT:   Right Ear: There is swelling (with redness in the canal) and tenderness. Left Ear: There is swelling (with mild canal redness) and tenderness. Mouth/Throat: Oropharynx is clear and moist.           ASSESSMENT/PLAN  Diagnoses and all orders for this visit:    1. Acute swimmer's ear of both sides  -     ciprofloxacin-dexamethasone (CIPRODEX) 0.3-0.1 % otic suspension; Administer 4 Drops into each ear two (2) times a day. Advised the patient to call back or return to office if symptoms worsen/change/persist.   Discussed expected course/resolution/complications of diagnosis in detail with patient. Medication risks/benefits/costs/interactions/alternatives discussed with patient. The patient was given an after visit summary which includes diagnoses, current medications, & vitals. They expressed understanding with the diagnosis and plan.

## 2020-02-07 DIAGNOSIS — E03.9 ACQUIRED HYPOTHYROIDISM: ICD-10-CM

## 2020-02-07 RX ORDER — LEVOTHYROXINE SODIUM 25 UG/1
25 TABLET ORAL
Qty: 90 TAB | Refills: 0 | Status: SHIPPED | OUTPATIENT
Start: 2020-02-07 | End: 2020-05-20 | Stop reason: SDUPTHER

## 2020-05-20 ENCOUNTER — TELEPHONE (OUTPATIENT)
Dept: INTERNAL MEDICINE CLINIC | Age: 60
End: 2020-05-20

## 2020-05-20 ENCOUNTER — VIRTUAL VISIT (OUTPATIENT)
Dept: INTERNAL MEDICINE CLINIC | Age: 60
End: 2020-05-20

## 2020-05-20 VITALS — WEIGHT: 253 LBS | BODY MASS INDEX: 46.27 KG/M2

## 2020-05-20 DIAGNOSIS — K21.9 GASTROESOPHAGEAL REFLUX DISEASE, ESOPHAGITIS PRESENCE NOT SPECIFIED: ICD-10-CM

## 2020-05-20 DIAGNOSIS — E78.00 PURE HYPERCHOLESTEROLEMIA: Primary | ICD-10-CM

## 2020-05-20 DIAGNOSIS — E03.9 ACQUIRED HYPOTHYROIDISM: ICD-10-CM

## 2020-05-20 RX ORDER — LEVOTHYROXINE SODIUM 25 UG/1
25 TABLET ORAL
Qty: 90 TAB | Refills: 1 | Status: SHIPPED | OUTPATIENT
Start: 2020-05-20 | End: 2020-12-29 | Stop reason: SDUPTHER

## 2020-05-20 NOTE — TELEPHONE ENCOUNTER
Spoke with patient, informed need to have CPAP supplies ordered by sleep doctor. Provided patient with contact # to call and schedule appointment. Patient scheduled a follow up today for refill on thyroid medication.

## 2020-05-20 NOTE — PROGRESS NOTES
Chief Complaint   Patient presents with    Hypothyroidism     1. Have you been to the ER, urgent care clinic since your last visit? Hospitalized since your last visit? No    2. Have you seen or consulted any other health care providers outside of the [address] [place] [address] since your last visit? Include any pap smears or colon screening.  No

## 2020-05-20 NOTE — PROGRESS NOTES
Kisha Diane is a 60 y.o. female who was seen by synchronous (real-time) audio-video technology on 5/20/2020. She confirmed that, for purposes of billing, this is a virtual visit with her provider for which we will submit a claim for reimbursement to her insurance company. She is aware that she will be responsible for any copays, coinsurance amounts or other amounts not covered by her insurance company. Do you accept - YES    This visit was completed was completed virtually using [employer]        Subjective:   Kisha Diane was seen for Hypothyroidism      Endocrine Review  Patient is seen for followup of hypothyroidism. Since last visit: she has been doing well. She reports medication compliance: compliant all of the time. She reports the following concerns/problems/med side effects: none. Lab review: orders written for new lab studies as appropriate; see orders. Prior to Admission medications    Medication Sig Start Date End Date Taking? Authorizing Provider   levothyroxine (SYNTHROID) 25 mcg tablet Take 1 Tab by mouth nightly. APPT REQUIRED PRIOR TO NEXT REFILL 2/7/20  Yes Geena Zhang MD   cholecalciferol, vitamin D3, (VITAMIN D3) 2,000 unit tab Take 4,000 Units by mouth daily. Yes Provider, Historical   acetaminophen (TYLENOL) 325 mg tablet Take 650 mg by mouth every four (4) hours as needed for Pain. Yes Provider, Historical   venlafaxine (EFFEXOR) 75 mg tablet Take 75 mg by mouth daily. Yes Provider, Historical   venlafaxine-SR (EFFEXOR XR) 150 mg capsule Take 150 mg by mouth daily. Yes Provider, Historical   risperiDONE (RISPERDAL) 3 mg tablet Take 3 mg by mouth nightly. Yes Provider, Historical   cpap machine kit by Does Not Apply route. Dx 327.23 7/13/15  Yes Daly Jaime PA   TRAZODONE HCL (TRAZODONE PO) Take 50 mg by mouth nightly as needed. Yes Provider, Historical   lamoTRIgine (LAMICTAL) 100 mg tablet Take 200 mg by mouth nightly.    Yes Provider, Historical   ciprofloxacin-dexamethasone (CIPRODEX) 0.3-0.1 % otic suspension Administer 4 Drops into each ear two (2) times a day. 9/30/19 5/20/20  Sisi Thomson MD       Allergies   Allergen Reactions    Flagyl [Metronidazole] Other (comments)     Ams, HEADACHES AND DISTRESSED       Patient Active Problem List    Diagnosis Date Noted    Morbid obesity ([facility] 04/19/2018    Symptomatic cholelithiasis 10/18/2017    DDD (degenerative disc disease), lumbosacral     OA (osteoarthritis)     GERD (gastroesophageal reflux disease) 08/06/2014    Vitamin D deficiency 09/24/2013    Hypothyroidism 02/11/2013    NIKOLE (obstructive sleep apnea) 05/07/2010    Bipolar I disorder, most recent episode depressed, severe without psychotic features [facility] 05/07/2010    Pure hypercholesterolemia 05/07/2010     Current Outpatient Medications   Medication Sig Dispense Refill    levothyroxine (SYNTHROID) 25 mcg tablet Take 1 Tab by mouth nightly. APPT REQUIRED PRIOR TO NEXT REFILL 90 Tab 0    cholecalciferol, vitamin D3, (VITAMIN D3) 2,000 unit tab Take 4,000 Units by mouth daily.  acetaminophen (TYLENOL) 325 mg tablet Take 650 mg by mouth every four (4) hours as needed for Pain.  venlafaxine (EFFEXOR) 75 mg tablet Take 75 mg by mouth daily.  venlafaxine-SR (EFFEXOR XR) 150 mg capsule Take 150 mg by mouth daily.  risperiDONE (RISPERDAL) 3 mg tablet Take 3 mg by mouth nightly.  cpap machine kit by Does Not Apply route. Dx 327.23 1 Kit 0    TRAZODONE HCL (TRAZODONE PO) Take 50 mg by mouth nightly as needed.  lamoTRIgine (LAMICTAL) 100 mg tablet Take 200 mg by mouth nightly.        Allergies   Allergen Reactions    Flagyl [Metronidazole] Other (comments)     Ams, HEADACHES AND DISTRESSED     Past Medical History:   Diagnosis Date    Anxiety and depression     [facility]    Bipolar Disorder     [facility]    DDD (degenerative disc disease), lumbosacral L5-S1 ([facility])    Depression     Foot fracture, right     h/o    Genital herpes     GERD (gastroesophageal reflux disease)     History of gastritis     Dr. Sue Mondragon Hyperlipidemia     Hypothyroidism (acquired)     Menopause     LMP- 51years old?  OA (osteoarthritis)     knees, Dr. Josseline Silver    Obesity     NIKOLE on CPAP     adherent with CPAP use    Symptomatic cholelithiasis 10/18/2017     Past Surgical History:   Procedure Laterality Date    HX COLONOSCOPY  12    colon polyp, repeat in 5 yrs, Dr. Celestine Yung HX ENDOSCOPY  16    gastritis, hiatal hernia (Dr. Lanie Jon)    HX LAP CHOLECYSTECTOMY  10/18/2018    Laparoscopic cholecystectomy by Dr. Casandra Stacy.  HX OTHER SURGICAL      cyst removed from scalp - benign     Family History   Problem Relation Age of Onset    Heart Disease Mother     Lung Disease Father     Diabetes Father     Heart Disease Father     COPD Father     Seizures Brother     Cancer Maternal Grandmother         OVARIAN, COLON    Diabetes Paternal Uncle     Diabetes Paternal Grandfather     Colon Cancer Other         materal grandmothers siblings x 3-4  of colon cancer    Celiac Disease Brother     Heart Disease Maternal Grandfather     Anesth Problems Neg Hx           ROS - per HPI      Objective:     General: alert, no distress   Mental  status: pe mental status_general use: normal mood, behavior, speech, dress, motor activity, and thought processes   Eyes: EOM intact, normal sclera   Mouth: mucous membranes moist   Neck: no visualized mass   Resp: PULM - obs findings: normal effort and no respiratory distress   Neuro: neuro - obs: no gross deficits   Musculoskeletal: normal ROM of neck   Skin: skin exam: no discoloration or lesions of concern on visible areas   Psychiatric: normal affect           Assessment & Plan:   Diagnoses and all orders for this visit:    1.  Pure hypercholesterolemia  -     LIPID PANEL  -     METABOLIC PANEL, COMPREHENSIVE    2. Acquired hypothyroidism  -     levothyroxine (SYNTHROID) 25 mcg tablet; Take 1 Tab by mouth nightly.  -     TSH 3RD GENERATION    3. Gastroesophageal reflux disease, esophagitis presence not specified  -     CBC WITH AUTOMATED DIFF      Follow-up and Dispositions    · Return in about 3 months (around 8/20/2020) for Follow up. CPT Codes 24601-14366 for Established Patients may apply to this Telehealth Visit        Due to this being a TeleHealth evaluation, many elements of the physical examination are unable to be assessed. Pursuant to the emergency declaration under the [address] [place] [address], [facility] waiver authority and the [employer] Act, this Virtual  Visit was conducted, with patient's consent, to reduce the patient's risk of exposure to COVID-19 and provide continuity of care for an established patient. Services were provided through a video synchronous discussion virtually to substitute for in-person clinic visit. We discussed the expected course, resolution and complications of the diagnosis(es) in detail. Medication risks, benefits, costs, interactions, and alternatives were discussed as indicated. I advised her to contact the office if her condition worsens, changes or fails to improve as anticipated. She expressed understanding with the diagnosis(es) and plan.      Carmela Solano MD

## 2020-07-30 ENCOUNTER — VIRTUAL VISIT (OUTPATIENT)
Dept: SLEEP MEDICINE | Age: 60
End: 2020-07-30

## 2020-07-30 DIAGNOSIS — E07.9 THYROID DISORDER: ICD-10-CM

## 2020-07-30 DIAGNOSIS — F31.70 BIPOLAR DISORDER IN REMISSION (HCC): ICD-10-CM

## 2020-07-30 DIAGNOSIS — G47.33 OSA (OBSTRUCTIVE SLEEP APNEA): Primary | ICD-10-CM

## 2020-07-30 NOTE — PROGRESS NOTES
[address] [facility] Angel Luis. [place], [address]  Tel.  [phone]  Fax. [address] [facility]  [place], [address] [facility]  Tel.  [phone]  Fax. [phone] [address] [facility] Ulisses Villegas  Tel.  [phone]  Fax. [phone]         Subjective:    Kimo Perry is a 63 y.o. female who was seen by synchronous (real-time) audio-video technology on 7/30/2020. Consent:  She is aware that this patient-initiated Telehealth encounter is a billable service, with coverage as determined by her insurance carrier. She is aware that she may receive a bill and has provided verbal consent to proceed: Yes    I was in the office while conducting this encounter. Patient verified with 's license. She complains of snoring associated with snorting, periods of not breathing, excessive daytime sleepiness. Symptoms began several years ago, she was diagnosed with NIKOLE in 2007 and has been on PAP Therapy (see media) since that time. She usually can fall asleep in 30 minutes. Family or house members do note snoring on PAP. She denies completely or partially paralyzed while falling asleep or waking up. Kimo Perry  does wake up frequently at night. She is bothered by waking up too early and left unable to get back to sleep. She actually sleeps about 6-8 hours at night and wakes up about 3-4 times during the night for no specific. She does not work shifts. [facility] Staff indicates she does get too little sleep at night. Her bedtime is 10:30. She awakens at 6:00 am. She does take naps. She takes 1-2 naps a week lasting 2 hours. She has the following observed behaviors:  ;  .  Other remarks:  She states that she is up reading all night on some nights and may lay awake in bed with her PAP mask in place.       [place] Sleepiness Score: 7   and Modified F.O.S.Q. Score Total / 2: 20       Allergies   Allergen Reactions    Flagyl [Metronidazole] Other (comments) Ams, HEADACHES AND DISTRESSED         Current Outpatient Medications:     levothyroxine (SYNTHROID) 25 mcg tablet, Take 1 Tab by mouth nightly., Disp: 90 Tab, Rfl: 1    cholecalciferol, vitamin D3, (VITAMIN D3) 2,000 unit tab, Take 4,000 Units by mouth daily. , Disp: , Rfl:     acetaminophen (TYLENOL) 325 mg tablet, Take 650 mg by mouth every four (4) hours as needed for Pain., Disp: , Rfl:     venlafaxine (EFFEXOR) 75 mg tablet, Take 75 mg by mouth daily. , Disp: , Rfl:     venlafaxine-SR (EFFEXOR XR) 150 mg capsule, Take 150 mg by mouth daily. , Disp: , Rfl:     risperiDONE (RISPERDAL) 3 mg tablet, Take 3 mg by mouth nightly., Disp: , Rfl:     cpap machine kit, by Does Not Apply route. Dx 327.23, Disp: 1 Kit, Rfl: 0    TRAZODONE HCL (TRAZODONE PO), Take 50 mg by mouth nightly as needed. , Disp: , Rfl:     lamoTRIgine (LAMICTAL) 100 mg tablet, Take 200 mg by mouth nightly., Disp: , Rfl:      She  has a past medical history of Anxiety and depression, Bipolar Disorder, DDD (degenerative disc disease), lumbosacral, Depression, Foot fracture, right, Genital herpes, GERD (gastroesophageal reflux disease), History of gastritis, Hyperlipidemia, Hypothyroidism (acquired), Menopause, OA (osteoarthritis), Obesity, NIKOLE on CPAP (2007), and Symptomatic cholelithiasis (10/18/2017). She also has no past medical history of Adverse effect of anesthesia. She  has a past surgical history that includes hx colonoscopy (9/20/12); hx endoscopy (5/19/16); hx other surgical; and hx lap cholecystectomy (10/18/2018). She family history includes COPD in her father; Cancer in her maternal grandmother; Celiac Disease in her brother; Colon Cancer in an other family member; Diabetes in her father, paternal grandfather, and paternal uncle; Heart Disease in her father, maternal grandfather, and mother; Lung Disease in her father; Seizures in her brother. She  reports that she has never smoked.  She has never used smokeless tobacco. She reports that she does not drink alcohol or use drugs. Review of Systems:  Constitutional:  No significant weight loss or weight gain  Eyes:  No blurred vision  CVS:  No significant chest pain  Pulm:  No significant shortness of breath  GI:  No significant nausea or vomiting  :  No significant nocturia  Musculoskeletal:  No significant joint pain at night  Skin:  No significant rashes  Neuro:  No significant dizziness   Psych:  No active mood issues    Sleep Review of Systems: notable for no difficulty falling asleep; frequent awakenings at night;  regular dreaming noted; no nightmares ; no early morning headaches; no memory problems; no concentration issues; no history of any automobile or occupational accidents due to daytime drowsiness. Objective:     Patient-Reported Vitals 7/30/2020   Patient-Reported Weight 253 lbs   Patient-Reported Height 5' 2\"       Physical Exam completed by visual and auditory observation of patient with verbal input from patient. General:   Alert, oriented, not in acute distress   Eyes:  Anicteric Sclerae; no obvious strabismus   Nose:  No obvious nasal septum deviation    Neck:   Midline trachea, no visible mass   Chest/Lungs:  Respiratory effort normal, no visualized signs of difficulty breathing or respiratory distress   CVS:  No JVD   Extremities:  No obvious rashes noted on face, neck, or hands   Neuro:  No facial asymmetry, no focal deficits; no obvious tremor    Psych:  Normal affect,  normal countenance       Assessment:       ICD-10-CM ICD-9-CM    1. NIKOLE (obstructive sleep apnea)  G47.33 327.23 POLYSOMNOGRAPHY 1 NIGHT   2. Bipolar disorder in remission ([facility]  F31.70 296.80    3. BMI 45.0-49.9, adult ([facility])  Z68.42 V85.42    4. Thyroid disorder  E07.9 246.9          Plan:        Sleep testing was ordered for initial evaluation.  She was provided information on sleep apnea including coresponding risk factors and the importance of proper treatment.  Treatment options if indicated were reviewed today. Patient agrees to a trial of PAP therapy if indicated.  Counseling was provided regarding proper sleep hygiene (impact of short sleep on brain function and memory discussed) , appropriate sleep schedule (use of bed only for sleeping / paradoxical intention technique reviewed), need for sleep environment safety and safe driving.  Recommended a dedicated weight loss program through appropriate diet and exercise regimen as significant weight reduction has been shown to reduce severity of obstructive sleep apnea. Patient's phone number [phone] (cell) was reviewed and confirmed for accuracy. She gives permission for messages regarding results and appointments to be left at that number. Pursuant to the emergency declaration under the [facility], [facility] waiver authority and the [employer] Act, this Virtual Visit was conducted, with patient's consent, to reduce the patient's risk of exposure to COVID-19 and provide continuity of care for an established patient. Services were provided through a video synchronous discussion virtually to substitute for in-person clinic visit. Avinash Schwartz MD, FAASM  Electronically signed.  07/30/20

## 2020-07-30 NOTE — PATIENT INSTRUCTIONS
217 [facility] [place]. 1668 [facility], [address] Tel.  [phone] Fax. 100 [facility] [address] [place], [address] [facility] Tel.  [phone] Fax. [phone] [address] [place] [address]o Tel.  [phone] Fax. [phone] Sleep Apnea: After Your Visit Your Care Instructions Sleep apnea occurs when you frequently stop breathing for 10 seconds or longer during sleep. It can be mild to severe, based on the number of times per hour that you stop breathing or have slowed breathing. Blocked or narrowed airways in your nose, mouth, or throat can cause sleep apnea. Your airway can become blocked when your throat muscles and tongue relax during sleep. Sleep apnea is common, occurring in 1 out of 20 individuals. Individuals having any of the following characteristics should be evaluated and treated right away due to high risk and detrimental consequences from untreated sleep apnea: 
1. Obesity 2. Congestive Heart failure 3. Atrial Fibrillation 4. Uncontrolled Hypertension 5. Type II Diabetes 6. Night-time Arrhythmias 7. Stroke 8. Pulmonary Hypertension 9. High-risk Driving Populations (pilots, truck drivers, etc.) 10. Patients Considering Weight-loss Surgery How do you know you have sleep apnea? You probably have sleep apnea if you answer 'yes' to 3 or more of the following questions: S - Have you been told that you Snore? T - Are you often Tired during the day? O - Has anyone Observed you stop breathing while sleeping? P- Do you have (or are being treated for) high blood Pressure? B - Are you obese (Body Mass Index > 35)? A - Is your Age 48years old or older? N - Is your Neck size greater than 16 inches? G - Are you male Gender? A sleep physician can prescribe a breathing device that prevents tissues in the throat from blocking your airway.  Or your doctor may recommend using a dental device (oral breathing device) to help keep your airway open. In some cases, surgery may be needed to remove enlarged tissues in the throat. Follow-up care is a key part of your treatment and safety. Be sure to make and go to all appointments, and call your doctor if you are having problems. It's also a good idea to know your test results and keep a list of the medicines you take. How can you care for yourself at home? · Lose weight, if needed. It may reduce the number of times you stop breathing or have slowed breathing. · Go to bed at the same time every night. · Sleep on your side. It may stop mild apnea. If you tend to roll onto your back, sew a pocket in the back of your pajama top. Put a tennis ball into the pocket, and stitch the pocket shut. This will help keep you from sleeping on your back. · Avoid alcohol and medicines such as sleeping pills and sedatives before bed. · Do not smoke. Smoking can make sleep apnea worse. If you need help quitting, talk to your doctor about stop-smoking programs and medicines. These can increase your chances of quitting for good. · Prop up the head of your bed 4 to 6 inches by putting bricks under the legs of the bed. · Treat breathing problems, such as a stuffy nose, caused by a cold or allergies. · Use a continuous positive airway pressure (CPAP) breathing machine if lifestyle changes do not help your apnea and your doctor recommends it. The machine keeps your airway from closing when you sleep. · If CPAP does not help you, ask your doctor whether you should try other breathing machines. A bilevel positive airway pressure machine has two types of air pressureâone for breathing in and one for breathing out. Another device raises or lowers air pressure as needed while you breathe. · If your nose feels dry or bleeds when using one of these machines, talk with your doctor about increasing moisture in the air. A humidifier may help.  
· If your nose is runny or stuffy from using a breathing machine, talk with your doctor about using decongestants or a corticosteroid nasal spray. When should you call for help? Watch closely for changes in your health, and be sure to contact your doctor if: 
· You still have sleep apnea even though you have made lifestyle changes. · You are thinking of trying a device such as CPAP. · You are having problems using a CPAP or similar machine. Where can you learn more? Go to [employer]. Enter J745 in the search box to learn more about \"Sleep Apnea: After Your Visit. \"  
© 7059-5011 Healthwise, Incorporated. Care instructions adapted under license by [facility] (which disclaims liability or warranty for this information). This care instruction is for use with your licensed healthcare professional. If you have questions about a medical condition or this instruction, always ask your healthcare professional. Dong Mccormack any warranty or liability for your use of this information. PROPER SLEEP HYGIENE What to avoid · Do not have drinks with caffeine, such as coffee or black tea, for 8 hours before bed. · Do not smoke or use other types of tobacco near bedtime. Nicotine is a stimulant and can keep you awake. · Avoid drinking alcohol late in the evening, because it can cause you to wake in the middle of the night. · Do not eat a big meal close to bedtime. If you are hungry, eat a light snack. · Do not drink a lot of water close to bedtime, because the need to urinate may wake you up during the night. · Do not read or watch TV in bed. Use the bed only for sleeping and sexual activity. What to try · Go to bed at the same time every night, and wake up at the same time every morning. Do not take naps during the day. · Keep your bedroom quiet, dark, and cool. · Get regular exercise, but not within 3 to 4 hours of your bedtime. Gerldine Prost · Sleep on a comfortable pillow and mattress. · If watching the clock makes you anxious, turn it facing away from you so you cannot see the time. · If you worry when you lie down, start a worry book. Well before bedtime, write down your worries, and then set the book and your concerns aside. · Try meditation or other relaxation techniques before you go to bed. · If you cannot fall asleep, get up and go to another room until you feel sleepy. Do something relaxing. Repeat your bedtime routine before you go to bed again. · Make your house quiet and calm about an hour before bedtime. Turn down the lights, turn off the TV, log off the computer, and turn down the volume on music. This can help you relax after a busy day. Drowsy Driving The [employer]fered[employer].com cites drowsiness as a causing factor in more than 491,139 police reported crashes annually, resulting in 76,000 injuries and 1,500 deaths. Other surveys suggest 55% of people polled have driven while drowsy in the past year, 23% had fallen asleep but not crashed, 3% crashed, and 2% had and accident due to drowsy driving. Who is at risk? Young Drivers: One study of drowsy driving accidents states that 55% of the drivers were under 25 years. Of those, 75% were male. Shift Workers and Travelers: People who work overnight or travel across time zones frequently are at higher risk of experiencing Circadian Rhythm Disorders. They are trying to work and function when their body is programed to sleep. Sleep Deprived: Lack of sleep has a serious impact on your ability to pay attention or focus on a task. Consistently getting less than the average of 8 hours your body needs creates partial or cumulative sleep deprivation. Untreated Sleep Disorders: Sleep Apnea, Narcolepsy, R.L.S., and other sleep disorders (untreated) prevent a person from getting enough restful sleep.  This leads to excessive daytime sleepiness and increases the risk for drowsy driving accidents by up to 7 times. Medications / Alcohol: Even over the counter medications can cause drowsiness. Medications that impair a drivers attention should have a warning label. Alcohol naturally makes you sleepy and on its own can cause accidents. Combined with excessive drowsiness its effects are amplified. Signs of Drowsy Driving: * You don't remember driving the last few miles * You may drift out of your cheri * You are unable to focus and your thoughts wander * You may yawn more often than normal 
 * You have difficulty keeping your eyes open / nodding off * Missing traffic signs, speeding, or tailgating Prevention-  
Good sleep hygiene, lifestyle and behavioral choices have the most impact on drowsy driving. There is no substitute for sleep and the average person requires 8 hours nightly. If you find yourself driving drowsy, stop and sleep. Consider the sleep hygiene tips provided during your visit as well. Medication Refill Policy: Refills for all medications require 1 week advance notice. Please have your pharmacy fax a refill request. We are unable to fax, or call in \"controled substance\" medications and you will need to pick these prescriptions up from our office. NetPayment Activation Thank you for requesting access to NetPayment. Please follow the instructions below to securely access and download your online medical record. NetPayment allows you to send messages to your doctor, view your test results, renew your prescriptions, schedule appointments, and more. How Do I Sign Up? 1. In your internet browser, go to [email or web address]. 2. Click on the First Time User? Click Here link in the Sign In box. You will see the New Member Sign Up page. 3. Enter your NetPayment Access Code exactly as it appears below. You will not need to use this code after youve completed the sign-up process.  If you do not sign up before the expiration date, you must request a new code. U-Systems Access Code: Activation code not generated Current U-Systems Status: Active (This is the date your U-Systems access code will ) 4. Enter the last four digits of your Social Security Number (xxxx) and Date of Birth (mm/dd/yyyy) as indicated and click Submit. You will be taken to the next sign-up page. 5. Create a Apartamat ID. This will be your U-Systems login ID and cannot be changed, so think of one that is secure and easy to remember. 6. Create a U-Systems password. You can change your password at any time. 7. Enter your Password Reset Question and Answer. This can be used at a later time if you forget your password. 8. Enter your e-mail address. You will receive e-mail notification when new information is available in [address] 9. Click Sign Up. You can now view and download portions of your medical record. 10. Click the Download Summary menu link to download a portable copy of your medical information. Additional Information If you have questions, please call [phone]. Remember, U-Systems is NOT to be used for urgent needs. For medical emergencies, dial 911.

## 2020-07-31 ENCOUNTER — DOCUMENTATION ONLY (OUTPATIENT)
Dept: SLEEP MEDICINE | Age: 60
End: 2020-07-31

## 2020-08-19 ENCOUNTER — TELEPHONE (OUTPATIENT)
Dept: INTERNAL MEDICINE CLINIC | Age: 60
End: 2020-08-19

## 2020-08-19 NOTE — TELEPHONE ENCOUNTER
Patient states sx not as bad, woke up headache. Better now. Patient had episode last weekend. Feel deep blood vessels pulsing, soft spot under sternum and under left breast. No pain. Could be related, overdue for esophageal scan with Dr Maria M Garcia. Patient scheduled follow up to discuss. She will complete labs tomorrow.

## 2020-08-19 NOTE — TELEPHONE ENCOUNTER
----- Message from Skytree Hermila sent at 8/18/2020  5:56 PM EDT -----  Regarding: Dr. Darrin Cesar first and last name: Seymour López      Reason for call:confusion headache diarrhea and possible UTI    19  Callback required yes/no and why:yes      Best contact number(s):[phone]      Details to clarify the request: patient would like to come in tomorrow 8/19/20      Reggie Cleaning

## 2020-08-21 ENCOUNTER — HOSPITAL ENCOUNTER (OUTPATIENT)
Dept: LAB | Age: 60
Discharge: HOME | End: 2020-08-21
Payer: MEDICARE

## 2020-08-21 PROCEDURE — 36415 COLL VENOUS BLD VENIPUNCTURE: CPT

## 2020-08-21 PROCEDURE — 80053 COMPREHEN METABOLIC PANEL: CPT

## 2020-08-21 PROCEDURE — 80061 LIPID PANEL: CPT

## 2020-08-21 PROCEDURE — 84443 ASSAY THYROID STIM HORMONE: CPT

## 2020-08-21 PROCEDURE — 85025 COMPLETE CBC W/AUTO DIFF WBC: CPT

## 2020-08-22 LAB
ALBUMIN SERPL-MCNC: 4.4 G/DL (ref 3.8–4.8)
ALBUMIN/GLOB SERPL: 2.4 {RATIO} (ref 1.2–2.2)
ALP SERPL-CCNC: 111 IU/L (ref 39–117)
ALT SERPL-CCNC: 25 IU/L (ref 0–32)
AST SERPL-CCNC: 14 IU/L (ref 0–40)
BASOPHILS # BLD AUTO: 0 X10E3/UL (ref 0–0.2)
BASOPHILS NFR BLD AUTO: 0 %
BILIRUB SERPL-MCNC: 0.2 MG/DL (ref 0–1.2)
BUN SERPL-MCNC: 17 MG/DL (ref 8–27)
BUN/CREAT SERPL: 21 (ref 12–28)
CALCIUM SERPL-MCNC: 8.8 MG/DL (ref 8.7–10.3)
CHLORIDE SERPL-SCNC: 102 MMOL/L (ref 96–106)
CHOLEST SERPL-MCNC: 194 MG/DL (ref 100–199)
CO2 SERPL-SCNC: 25 MMOL/L (ref 20–29)
CREAT SERPL-MCNC: 0.82 MG/DL (ref 0.57–1)
EOSINOPHIL # BLD AUTO: 0.1 X10E3/UL (ref 0–0.4)
EOSINOPHIL NFR BLD AUTO: 1 %
ERYTHROCYTE [DISTWIDTH] IN BLOOD BY AUTOMATED COUNT: 13.5 % (ref 11.7–15.4)
GLOBULIN SER CALC-MCNC: 1.8 G/DL (ref 1.5–4.5)
GLUCOSE SERPL-MCNC: 146 MG/DL (ref 65–99)
HCT VFR BLD AUTO: 41 % (ref 34–46.6)
HDLC SERPL-MCNC: 39 MG/DL
HGB BLD-MCNC: 13.7 G/DL (ref 11.1–15.9)
IMM GRANULOCYTES # BLD AUTO: 0 X10E3/UL (ref 0–0.1)
IMM GRANULOCYTES NFR BLD AUTO: 1 %
LDLC SERPL CALC-MCNC: 114 MG/DL (ref 0–99)
LYMPHOCYTES # BLD AUTO: 1.6 X10E3/UL (ref 0.7–3.1)
LYMPHOCYTES NFR BLD AUTO: 27 %
MCH RBC QN AUTO: 29.2 PG (ref 26.6–33)
MCHC RBC AUTO-ENTMCNC: 33.4 G/DL (ref 31.5–35.7)
MCV RBC AUTO: 87 FL (ref 79–97)
MONOCYTES # BLD AUTO: 0.5 X10E3/UL (ref 0.1–0.9)
MONOCYTES NFR BLD AUTO: 9 %
NEUTROPHILS # BLD AUTO: 3.6 X10E3/UL (ref 1.4–7)
NEUTROPHILS NFR BLD AUTO: 62 %
PLATELET # BLD AUTO: 189 X10E3/UL (ref 150–450)
POTASSIUM SERPL-SCNC: 4 MMOL/L (ref 3.5–5.2)
PROT SERPL-MCNC: 6.2 G/DL (ref 6–8.5)
RBC # BLD AUTO: 4.69 X10E6/UL (ref 3.77–5.28)
SODIUM SERPL-SCNC: 141 MMOL/L (ref 134–144)
TRIGL SERPL-MCNC: 203 MG/DL (ref 0–149)
TSH SERPL DL<=0.005 MIU/L-ACNC: 2.84 UIU/ML (ref 0.45–4.5)
VLDLC SERPL CALC-MCNC: 41 MG/DL (ref 5–40)
WBC # BLD AUTO: 5.9 X10E3/UL (ref 3.4–10.8)

## 2020-08-24 ENCOUNTER — OFFICE VISIT (OUTPATIENT)
Dept: INTERNAL MEDICINE CLINIC | Age: 60
End: 2020-08-24
Payer: MEDICARE

## 2020-08-24 VITALS
HEIGHT: 62 IN | OXYGEN SATURATION: 96 % | DIASTOLIC BLOOD PRESSURE: 90 MMHG | BODY MASS INDEX: 48.4 KG/M2 | SYSTOLIC BLOOD PRESSURE: 140 MMHG | TEMPERATURE: 95.4 F | WEIGHT: 263 LBS | HEART RATE: 121 BPM

## 2020-08-24 DIAGNOSIS — E03.9 ACQUIRED HYPOTHYROIDISM: Primary | ICD-10-CM

## 2020-08-24 DIAGNOSIS — R00.0 TACHYCARDIA: ICD-10-CM

## 2020-08-24 DIAGNOSIS — F31.4 BIPOLAR I DISORDER, MOST RECENT EPISODE DEPRESSED, SEVERE WITHOUT PSYCHOTIC FEATURES (HCC): ICD-10-CM

## 2020-08-24 DIAGNOSIS — E78.00 PURE HYPERCHOLESTEROLEMIA: ICD-10-CM

## 2020-08-24 DIAGNOSIS — E66.01 MORBID OBESITY (HCC): ICD-10-CM

## 2020-08-24 PROCEDURE — 3017F COLORECTAL CA SCREEN DOC REV: CPT | Performed by: INTERNAL MEDICINE

## 2020-08-24 PROCEDURE — G9899 SCRN MAM PERF RSLTS DOC: HCPCS | Performed by: INTERNAL MEDICINE

## 2020-08-24 PROCEDURE — G8417 CALC BMI ABV UP PARAM F/U: HCPCS | Performed by: INTERNAL MEDICINE

## 2020-08-24 PROCEDURE — 93010 ELECTROCARDIOGRAM REPORT: CPT | Performed by: INTERNAL MEDICINE

## 2020-08-24 PROCEDURE — G8427 DOCREV CUR MEDS BY ELIG CLIN: HCPCS | Performed by: INTERNAL MEDICINE

## 2020-08-24 PROCEDURE — G0463 HOSPITAL OUTPT CLINIC VISIT: HCPCS | Performed by: INTERNAL MEDICINE

## 2020-08-24 PROCEDURE — 99213 OFFICE O/P EST LOW 20 MIN: CPT | Performed by: INTERNAL MEDICINE

## 2020-08-24 PROCEDURE — G9717 DOC PT DX DEP/BP F/U NT REQ: HCPCS | Performed by: INTERNAL MEDICINE

## 2020-08-24 PROCEDURE — 93005 ELECTROCARDIOGRAM TRACING: CPT | Performed by: INTERNAL MEDICINE

## 2020-08-24 NOTE — PROGRESS NOTES
Follow Up Visit    Rolando Yip is a 63 y.o. female. she presents for Thyroid Problem (f/u); GERD; Results (labs); and Chest Pain    She had noted an episode of epigastric discomfort a few days ago but notes that this has resolved. She feels normal today. She has had some increased anxiety regarding this concern--she admits to feeling anxious today. She is tachycardic but does not note chest pain or shortness of breath. Patient Active Problem List   Diagnosis Code    NIKOLE (obstructive sleep apnea) G47.33    Bipolar I disorder, most recent episode depressed, severe without psychotic features ([facility] F31.4    Pure hypercholesterolemia E78.00    Hypothyroidism E03.9    Vitamin D deficiency E55.9    GERD (gastroesophageal reflux disease) K21.9    OA (osteoarthritis) M19.90    DDD (degenerative disc disease), lumbosacral M51.37    Symptomatic cholelithiasis K80.20    Morbid obesity ([facility] E66.01         Prior to Admission medications    Medication Sig Start Date End Date Taking? Authorizing Provider   levothyroxine (SYNTHROID) 25 mcg tablet Take 1 Tab by mouth nightly. 5/20/20  Yes Alphonse Quigley MD   cholecalciferol, vitamin D3, (VITAMIN D3) 2,000 unit tab Take 4,000 Units by mouth daily. Yes Provider, Historical   acetaminophen (TYLENOL) 325 mg tablet Take 650 mg by mouth every four (4) hours as needed for Pain. Yes Provider, Historical   venlafaxine (EFFEXOR) 75 mg tablet Take 75 mg by mouth daily. Yes Provider, Historical   venlafaxine-SR (EFFEXOR XR) 150 mg capsule Take 150 mg by mouth daily. Yes Provider, Historical   risperiDONE (RISPERDAL) 3 mg tablet Take 3 mg by mouth nightly. Yes Provider, Historical   cpap machine kit by Does Not Apply route. Dx 327.23 7/13/15  Yes Geneva Jaime PA   TRAZODONE HCL (TRAZODONE PO) Take 50 mg by mouth nightly as needed. Yes Provider, Historical   lamoTRIgine (LAMICTAL) 100 mg tablet Take 200 mg by mouth nightly.    Yes Provider, Historical         Health Maintenance   Topic Date Due    Shingrix Vaccine Age 49> (1 of 2) 05/29/2009    Colonoscopy  09/20/2017    Medicare Yearly Exam  05/29/2020    Breast Cancer Screen Mammogram  04/15/2021    PAP AKA CERVICAL CYTOLOGY  04/17/2022    Lipid Screen  03/28/2024    DTaP/Tdap/Td series (2 - Td) 05/29/2027    Hepatitis C Screening  Completed    Bone Densitometry (Dexa) Screening  Completed    Pneumococcal 0-64 years  Aged Out       Review of Systems   Constitutional: Negative. Respiratory: Negative. Cardiovascular: Negative. Genitourinary: Negative. Visit Vitals  /90 (BP 1 Location: Left arm, BP Patient Position: Sitting)   Pulse (!) 121   Temp (!) 95.4 °F (35.2 °C) (Oral)   Ht 5' 2\" (1.575 m)   Wt 263 lb (119.3 kg)   SpO2 96%   BMI 48.10 kg/m²       Physical Exam  Constitutional:       Appearance: Normal appearance. Cardiovascular:      Rate and Rhythm: Regular rhythm. Tachycardia present. Heart sounds: Normal heart sounds. Pulmonary:      Effort: Pulmonary effort is normal.      Breath sounds: Normal breath sounds. Neurological:      Mental Status: She is alert. EKG- sinus tachycardia    ASSESSMENT/PLAN    Diagnoses and all orders for this visit:    1. Acquired hypothyroidism    2. Pure hypercholesterolemia    3. Morbid obesity ([address]  -     REFERRAL TO NUTRITION    4. Bipolar I disorder, most recent episode depressed, severe without psychotic features ([address]    5. Tachycardia  -     AMB POC EKG ROUTINE W/ 12 LEADS, INTER & REP        Follow-up and Dispositions    · Return in about 3 months (around 11/24/2020) for Follow up.

## 2020-08-24 NOTE — PROGRESS NOTES
HIPAA verified by two patient identifiers. Amanda Hoyt is a 63 y.o. female    Chief Complaint   Patient presents with    Thyroid Problem     f/u    GERD    Results     labs    Chest Pain       Visit Vitals  /90 (BP 1 Location: Left arm, BP Patient Position: Sitting)   Pulse (!) 121   Temp (!) 95.4 °F (35.2 °C) (Oral)   Ht 5' 2\" (1.575 m)   Wt 263 lb (119.3 kg)   SpO2 96%   BMI 48.10 kg/m²       Pain Scale: 6/10  Pain Location: Knee      Health Maintenance Due   Topic Date Due    Shingrix Vaccine Age 49> (1 of 2) 05/29/2009    Colonoscopy  09/20/2017    Medicare Yearly Exam  05/29/2020         Coordination of Care Questionnaire:  :   1) Have you been to an emergency room, urgent care, or hospitalized since your last visit? If yes, where when, and reason for visit? no       2. Have seen or consulted any other health care provider since your last visit? If yes, where when, and reason for visit? NO      Patient is accompanied by self I have received verbal consent from Amanda Hoyt to discuss any/all medical information while they are present in the room.

## 2020-08-27 ENCOUNTER — TELEPHONE (OUTPATIENT)
Dept: SLEEP MEDICINE | Age: 60
End: 2020-08-27

## 2020-08-27 DIAGNOSIS — G47.33 OSA (OBSTRUCTIVE SLEEP APNEA): Primary | ICD-10-CM

## 2020-08-27 NOTE — TELEPHONE ENCOUNTER
Contacted patient to schedule PSG. Per patient, Dr. Penelope Brannon recommended that she be scheduled for HSAT without PAP therapy and a split study thereafter. Will clarify this with provider and call patient back.

## 2020-08-28 NOTE — TELEPHONE ENCOUNTER
No need for HSAT, proceed with Split. Orders Placed This Encounter    NOVEL CORONAVIRUS (COVID-19)     Standing Status:   Future     Standing Expiration Date:   11/28/2020     Scheduling Instructions:      1) Due to current limited availability of the COVID-19 PCR test, tests will be prioritized and may not be completed.              2) Order only if the test result will change clinical management or necessary for a return to mission-critical employment decision.              3) Print and instruct patient to adhere to [facility] home isolation program. (Link Above)              4) Set up or refer patient for a monitoring program.              5) Have patient sign up for and leverage MyChart (if not previously done).      Order Specific Question:   Status     Answer:   Asymptomatic/Surveillance(e.g. pre-op/pre-procedure/pre-delivery/transfer)     Order Specific Question:   Reason for Test     Answer:   Upcoming elective surgery/procedure/delivery, return to work, or discharge to another facility    SPLIT CPAP/PSG     Standing Status:   Future     Standing Expiration Date:   11/28/2020     Order Specific Question:   Reason for Exam     Answer:   NIKOLE

## 2020-08-31 ENCOUNTER — DOCUMENTATION ONLY (OUTPATIENT)
Dept: SLEEP MEDICINE | Age: 60
End: 2020-08-31

## 2020-09-09 ENCOUNTER — DOCUMENTATION ONLY (OUTPATIENT)
Dept: SLEEP MEDICINE | Age: 60
End: 2020-09-09

## 2020-09-14 ENCOUNTER — HOSPITAL ENCOUNTER (OUTPATIENT)
Dept: PREADMISSION TESTING | Age: 60
Discharge: HOME | End: 2020-09-14
Payer: MEDICARE

## 2020-09-14 DIAGNOSIS — Z01.812 PRE-PROCEDURE LAB EXAM: ICD-10-CM

## 2020-09-14 PROCEDURE — 87635 SARS-COV-2 COVID-19 AMP PRB: CPT

## 2020-09-15 LAB — SARS-COV-2, COV2NT: NOT DETECTED

## 2020-09-22 ENCOUNTER — HOSPITAL ENCOUNTER (OUTPATIENT)
Dept: SLEEP MEDICINE | Age: 60
Discharge: HOME | End: 2020-09-22
Payer: MEDICARE

## 2020-09-22 DIAGNOSIS — G47.33 OSA (OBSTRUCTIVE SLEEP APNEA): ICD-10-CM

## 2020-09-22 PROCEDURE — 95811 POLYSOM 6/>YRS CPAP 4/> PARM: CPT | Performed by: INTERNAL MEDICINE

## 2020-09-23 ENCOUNTER — DOCUMENTATION ONLY (OUTPATIENT)
Dept: SLEEP MEDICINE | Age: 60
End: 2020-09-23

## 2020-09-23 VITALS
DIASTOLIC BLOOD PRESSURE: 75 MMHG | BODY MASS INDEX: 48.4 KG/M2 | WEIGHT: 263 LBS | SYSTOLIC BLOOD PRESSURE: 130 MMHG | HEART RATE: 77 BPM | OXYGEN SATURATION: 96 % | HEIGHT: 62 IN | TEMPERATURE: 97.5 F

## 2020-09-23 NOTE — PROGRESS NOTES
[address] [facility] [address] [facility] [address] [place]. [place], [address]  Tel.  [phone]  Fax. 100 [place] [facility] [address]  [place], [address] [facility]  Tel.  [phone]  Fax. [phone] [address] [facility] [address]   Tel.  [phone]  Fax. [phone]     Sleep Study Technical Notes        PRE-Test:  Reinaldo Byrne (: 1960) arrived in the lobby. Patient was greeted, temperature checked (97.5°) and screening questions asked. The patient was taken to the Sleep Center and taken directly to his/her room. BP (130/75) and SaO2 (96) were taken. Scale currently not available. Procedure explained to the patient and questions were answered. The patient expressed understanding of the procedure. Electrodes were applied without incident. The patient was placed in bed and the study was started. Acquisition Notes:   Lights off: 11:39pm     Respiratory events: Obstructive, Hypopnea, Central, Mixed   ECG:  NSR   PAP titration: CPAP   Other comments:    Desensitization Mask(s) Used: F&P Simplus full face mask - Small cushion, Medium Headgear    POST Test:   Patient was awakened. Electrodes were removed. The patient was discharged after answering the Post Questionnaire. Patient stated that he/she was alert and ok to drive.  Equipment and room cleaned per infection control policy.

## 2020-09-28 ENCOUNTER — DOCUMENTATION ONLY (OUTPATIENT)
Dept: SLEEP MEDICINE | Age: 60
End: 2020-09-28

## 2020-10-02 ENCOUNTER — TELEPHONE (OUTPATIENT)
Dept: SLEEP MEDICINE | Age: 60
End: 2020-10-02

## 2020-10-02 DIAGNOSIS — G47.33 OSA (OBSTRUCTIVE SLEEP APNEA): Primary | ICD-10-CM

## 2020-10-02 NOTE — TELEPHONE ENCOUNTER
Parisa Bhardwaj is to be contacted by lead sleep technologist regarding results of Sleep Testing which was indicative of an average AHI of 9.4 per hour with an SpO2 eduardo of 90% and SpO2 of < 88% being 0 minutes. Patient met criteria for a PAP titration which was performed successfully. A prescription for a PAP device has been written (see below) and patient will be contacted by office staff regarding follow-up  in 2-3 months after initiation of therapy. Encounter Diagnosis   Name Primary?  NIKOLE (obstructive sleep apnea) Yes       Orders Placed This Encounter    AMB SUPPLY ORDER     Diagnosis: Obstructive Sleep Apnea ICD-10 Code (G47.33)    Positive Airway Pressure Therapy: Duration of need: 99 months. ResMed APAP Device with Heated Humidifer [id number] / [id number]. Minimum Pressure: 12 cmH2O, Maximum Pressure: 15 cmH2O.  Nasal Cushion (Replace) 2 per month.  Nasal Interface Mask 1 every 3 months.  Headgear 1 every 6 months.  Filter(s) Disposable 2 per month.  Filter(s) Non-Disposable 1 every 6 months. [address] [facility] for Locked [place] (Replace) 1 every 6 months.  Tubing with heating element 1 every 3 months. Perform Mask Fitting per patient preference and comfort - replace as above. Sara Sherwood MD, FAASM; NPI: [id number]  Electronically signed. 10/02/20

## 2020-10-06 ENCOUNTER — DOCUMENTATION ONLY (OUTPATIENT)
Dept: SLEEP MEDICINE | Age: 60
End: 2020-10-06

## 2020-10-06 DIAGNOSIS — G47.33 OSA (OBSTRUCTIVE SLEEP APNEA): Primary | ICD-10-CM

## 2020-10-06 NOTE — PROGRESS NOTES
Orders Placed This Encounter    AMB SUPPLY ORDER     Diagnosis: (G47.33) NIKOLE (obstructive sleep apnea)  (primary encounter diagnosis)     Replacement Supplies for Positive Airway Pressure Therapy Device:   Duration of need: 99 months.  Nasal Pillows Combo Mask (Replace) 2 per month.  Nasal Pillows (Replace) 2 per month.  Full Face Mask 1 every 3 months.  Full Face Mask Cushion 1 per month.  Nasal Cushion (Replace) 2 per month.  Nasal Interface Mask 1 every 3 months.  Headgear 1 every 6 months.  Chinstrap 1 every 6 months.  Tubing 1 every 3 months.  Tubing with heating element 1 every 3 months.  Filter(s) Disposable 2 per month.  Filter(s) Non-Disposable 1 every 6 months. .   [address] [facility] for Humidifier (Replace) 1 every 6 months. Barby Muro MD, FAASM; NPI: [id number]    Electronically signed.  Date:- 10/06/20

## 2020-10-07 ENCOUNTER — DOCUMENTATION ONLY (OUTPATIENT)
Dept: SLEEP MEDICINE | Age: 60
End: 2020-10-07

## 2020-12-08 ENCOUNTER — HOSPITAL ENCOUNTER (OUTPATIENT)
Dept: NUTRITION | Age: 60
Discharge: HOME | End: 2020-12-08
Payer: MEDICARE

## 2020-12-08 VITALS — HEIGHT: 62 IN | WEIGHT: 277 LBS | BODY MASS INDEX: 50.97 KG/M2

## 2020-12-08 PROCEDURE — 97802 MEDICAL NUTRITION INDIV IN: CPT

## 2020-12-08 NOTE — PROGRESS NOTES
1211  Assessment - Initial Nutrition Evaluation   DATE: 2020    Wolfgang Pierre was informed of the inherent limitations of a virtual visit,  and has consented to a virtual therapy visit on 2020. Information regarding emergency contact information for this patient during this visit is to contact: pt Stevie at # [phone] in addition to calling 911. The patient was informed that at any time during the virtual visit, they can decide to stop the virtual visit. The patient verified that they are physically located in the [facility] for this virtual visit. REFERRING PHYSICIAN: Eligio Roes MD  NAME: Wolfgang Pierre : 1960 AGE: 63 y.o. GENDER: female  REASON FOR VISIT: weight management    ASSESSMENT:  Past Medical Hx:  has a past medical history of Anxiety and depression, Bipolar Disorder, DDD, lumbosacral, Foot fracture, right, GERD, History of gastritis, Hyperlipidemia, Hypothyroidism, OA, Morbid Obesity, NIKOLE on CPAP (), and Symptomatic cholelithiasis. Pt seen 2019. Pt to restart weight management today (2020). Pt was 216 lbs in 2019. Pt has gained weight to current weight as of 2020 to 277 lbs. Pt up 61 lbs over the last 15 months. Pt has been eating a lot of \"take out\" and \"delivery\" foods. Pt often doesn't eat breakfast and eats a large lunch and then dinner and ~2x per week with consume 1/2 gallon ice cream at night. Pt had tried to start going to the pool at the [facility] last year, but since COVID-19 started, pt has not been to the pool or done any other exercise. Pt has joint pain preventing a lot of movement and household chores have been neglected which in turn prevents pt from cooking dinner at home. Discussed setting small goals to clean small areas at a time and feel a sense of accomplishment and success and fill \"bored\" time with productive tasks.      Pt has restarted attending phone call based O[employer]reat[employer]. Pt has done this program in the past with some success and is not starting back up with them. Encouraged pt to attend the Zoom based meetings with local members to provide a better sense of community and to do it daily to develop new better habits. LABS:   Lab Results   Component Value Date/Time    Hemoglobin A1c 5.4 10/21/2016 11:04 AM       MEDICATIONS/SUPPLEMENTS:   [unfilled]  Prior to Admission medications    Medication Sig Start Date End Date Taking? Authorizing Provider   levothyroxine (SYNTHROID) 25 mcg tablet Take 1 Tab by mouth nightly. 5/20/20   Jeffrey Modi MD   cholecalciferol, vitamin D3, (VITAMIN D3) 2,000 unit tab Take 4,000 Units by mouth daily. Provider, Historical   acetaminophen (TYLENOL) 325 mg tablet Take 650 mg by mouth every four (4) hours as needed for Pain. Provider, Historical   venlafaxine (EFFEXOR) 75 mg tablet Take 75 mg by mouth daily. Provider, Historical   venlafaxine-SR (EFFEXOR XR) 150 mg capsule Take 150 mg by mouth daily. Provider, Historical   risperiDONE (RISPERDAL) 3 mg tablet Take 3 mg by mouth nightly. Provider, Historical   cpap machine kit by Does Not Apply route. Dx 327.23 7/13/15   LEONOR Tamayo   TRAZODONE HCL (TRAZODONE PO) Take 50 mg by mouth nightly as needed. Provider, Historical   lamoTRIgine (LAMICTAL) 100 mg tablet Take 200 mg by mouth nightly. Provider, Historical       FOOD ALLERGIES/INTOLERANCES: none    ANTHROPOMETRICS:    Ht Readings from Last 1 Encounters:   12/08/20 5' 2.01\" (1.575 m)      Wt Readings from Last 1 Encounters:   12/08/20 125.6 kg (277 lb)         BMI: Body mass index is 50.65 kg/m².  Category: morbid obesity    Reported Wt Hx:    Estimated Nutritional Needs:   Kcals/day: 1807 (BMR x 1.3) -500  Protein: 125    Fluid: 1800        Reported Diet Hx:     24 Hour Diet Recall  Breakfast  Sometimes none - 2 cinnamon buns or cookies   Lunch  Large lunch- stromboli, subs, pizza   Dinner  [facility] food leftovers (beef w veg and rice)   Snacks  Chips, cookies, ice cream, brownies   Beverages  Diet soda, seltzer water     Exercise/Physical Actvity: none    Environmental/Social: 1/2 the week she lives alone, other 1/2 with mother        NUTRITION DIAGNOSIS: Overweight/obesity related to excessive energy intake as evidenced by BMI      NUTRITION INTERVENTION:   Reduce kcal and carbohydrate intake. Limit sugary sweets/drinks. PATIENT GOALS:  Lose 10 lbs over next month  Start cooking meals at home  Do chair exercises daily  Clean up 1 small section of house. Specific tips and techniques to facilitate compliance with above recommendations were provided and discussed. Pt was strongly encourage to begin making necessary changes now, and re-visit the dietitian prn. If further details are desired please feel free to contact me at [phone]. This phone number was also provided to the patient for any further questions or concerns. Michelle Cruz is a 63 yr old female being evaluated by a Virtual Visit (video visit) encounter to address concerns as mentioned above. A caregiver was present when appropriate. Due to this being a TeleHealth encounter (During OTREB-83 public health emergency), evaluation of the following areas was limited: Nutrition Focused Physical Exam. Pursuant to the emergency declaration under the [facility] [address] authority and the [employer] Act, this Virtual Visit was conducted with patient's (and/or legal guardian's) consent, to reduce the risk of exposure to COVID-19 and provide necessary medical care. Services were provided through a video synchronous discussion virtually to substitute for in-person encounter.           Lavinia Randall RD

## 2020-12-29 DIAGNOSIS — E03.9 ACQUIRED HYPOTHYROIDISM: ICD-10-CM

## 2020-12-29 RX ORDER — LEVOTHYROXINE SODIUM 25 UG/1
25 TABLET ORAL
Qty: 90 TAB | Refills: 0 | Status: SHIPPED | OUTPATIENT
Start: 2020-12-29 | End: 2021-09-08 | Stop reason: SDUPTHER

## 2021-01-13 ENCOUNTER — TELEPHONE (OUTPATIENT)
Dept: SLEEP MEDICINE | Age: 61
End: 2021-01-13

## 2021-01-13 NOTE — TELEPHONE ENCOUNTER
Returned the patients VM requesting a return call. She stated that she did not receive a new device and does not need and adherence visit. Patient feels that her current PAP machine is working fine. Appointment canceled.

## 2021-01-19 ENCOUNTER — HOSPITAL ENCOUNTER (OUTPATIENT)
Dept: NUTRITION | Age: 61
Discharge: HOME | End: 2021-01-19
Payer: MEDICARE

## 2021-01-19 VITALS — HEIGHT: 62 IN | WEIGHT: 265 LBS | BODY MASS INDEX: 48.76 KG/M2

## 2021-01-19 PROCEDURE — 97803 MED NUTRITION INDIV SUBSEQ: CPT

## 2021-01-19 NOTE — PROGRESS NOTES
1211   Assessment - Follow up Nutrition Evaluation   DATE: 2021     Dom Banda was informed of the inherent limitations of a virtual visit,  and has consented to a virtual therapy visit on 2020.  Information regarding emergency contact information for this patient during this visit is to contact: kenneth Stevie at # [phone] in addition to calling 911.  The patient was informed that at any time during the virtual visit, they can decide to stop the virtual visit. Franco Lin patient verified that they are physically located in the [facility] for this virtual visit.        REFERRING PHYSICIAN: Marizol Baltazar MD  NAME: Dom Banda : 1960 AGE: 63 y.o. GENDER: female  REASON FOR VISIT: weight management     ASSESSMENT:  Past Medical Hx:  has a past medical history of Anxiety and depression, Bipolar Disorder, DDD, lumbosacral, Foot fracture, right, GERD, History of gastritis, Hyperlipidemia, Hypothyroidism, OA, Morbid Obesity, NIKOLE on CPAP (), and Symptomatic cholelithiasis.     Pt seen Dec 2020. Pt to restart weight management today (2020). Pt was 216 lbs in 2019. Pt lost ~12 lbs over the last month since previous visit. Most recent weight on ~2021 was 265 lbs. Pt up 61 lbs over the last 15 months. Pt has been doing a lot more cooking and meal planning. Pt eating a lot less \"take out\" and \"delivery\" foods. Pt has been better most days of eating 3 normal meals and skipping meals less often. Pt was able to accomplish her household chores that had been neglected which had been preventing her from cooking at home. Pt met small goals to clean small areas at a time and feel a sense of accomplishment and success and fill \"bored\" time with productive tasks.  Pt has been writing a lot for her Overeaters Anonymous class to go through the 12 step program. Pt has continued attending phone call based and Zoom meetings for Overeaters Anonymous.        Pt states she has cut out added sugar along with excessive carbohydrates. Pt removed soda from her diet as well. Pt attempting to get emotional eating and cravings under control. Pt has made good changes since last month. Next visit 2/17/2021 at 2:30 pm for follow up.        LABS:         Lab Results   Component Value Date/Time     Hemoglobin A1c 5.4 10/21/2016 11:04 AM         MEDICATIONS/SUPPLEMENTS:   [unfilled]          Prior to Admission medications    Medication Sig Start Date End Date Taking? Authorizing Provider   levothyroxine (SYNTHROID) 25 mcg tablet Take 1 Tab by mouth nightly. 5/20/20     Ezekiel Dakin, MD   cholecalciferol, vitamin D3, (VITAMIN D3) 2,000 unit tab Take 4,000 Units by mouth daily.       Provider, Historical   acetaminophen (TYLENOL) 325 mg tablet Take 650 mg by mouth every four (4) hours as needed for Pain.       Provider, Historical   venlafaxine (EFFEXOR) 75 mg tablet Take 75 mg by mouth daily.       Provider, Historical   venlafaxine-SR (EFFEXOR XR) 150 mg capsule Take 150 mg by mouth daily.       Provider, Historical   risperiDONE (RISPERDAL) 3 mg tablet Take 3 mg by mouth nightly.       Provider, Historical   cpap machine kit by Does Not Apply route. Dx 327.23 7/13/15     Nam Jaime PA   TRAZODONE HCL (TRAZODONE PO) Take 50 mg by mouth nightly as needed.       Provider, Historical   lamoTRIgine (LAMICTAL) 100 mg tablet Take 200 mg by mouth nightly.       Provider, Historical         FOOD ALLERGIES/INTOLERANCES: none     ANTHROPOMETRICS:        Ht Readings from Last 1 Encounters:   12/08/20 5' 2.01\" (1.575 m)          Wt Readings from Last 1 Encounters:   1/19/21 120.2 kg (265 lb)                               BMI: Body mass index is 48.46 kg/m².            Category: morbid obesity     Reported Wt Hx:     Estimated Nutritional Needs:   Kcals/day: 1807 (BMR x 1.3) -500  Protein: 125    Fluid: 1800           Reported Diet Hx:     Jan 9   Breakfast- 2 boiled eggs, 1 1/2 C oatmeal, 2 clementines  lunch? dinner? Lunch was 4 oz canned tuna, raw spinach, carrots, celery, mustard, olive oil,garlic    Dinner sm pork chop, 4 oz sweet potato,  4 oz white potato, veggies w TBSP Meat sauce left over from portioning into freezer bags. Slept all day Terry 10 (up all night Jan 9)    Jan 11    I had no breakfast this a.m. & 1 1/2 C oatmeal made w 1 1/2 C milk & 1 C pineapple for dinner last night. That & 2 - 1C servings of pineapple were all I ate yesterday. Lunch 1-1/2 C mashed potato, Broccolli & 6 oz canned chicken     Dinner 1C meat sauce 1C brown rice, green beans     Jan 14    Breakfast 1 1/2 C oatmeal made w 1 1/2C milk    Ate Banana 1, 1:30 kleber    Dinner Will either be [place] or [employer] on [address]. Sloppy Tomas[employer]  tomato, veggies vinegar & spices. Mom will take out portion for me before adding other ingred. Not sure how Mom is figuring out portions. If 6 oz meat, 1 C rice, If 4 oz meat 1 1/2 C rice. Have 2 fruit left for day but may only eat one. Terry 15  Fri-- Up all night Thursday slept Fri day. Breakfast 2 boiled eggs, 1 apple. Dinner 1C brown rice, hamburger 6 oz, peas & carrots. Jan 16 Saturday Breakfast 2 boiled eggs 10:30/11 a.m. Dinner chicken breast,  brown rice, broccoli, carrots, cauliflower. Weighed Self 1st time in 6 weeks last week. Lost between 11 & 13 lbs. Jan 17    Sun-- 2 boiled eggs    Lg chicken breast, v small sweet potato yellow & green beans & carrots     Hamburger,  salad oil & balsamic vinegar , banana    Orange 1 hr after lunch        Exercise/Physical Actvity: none (\"300 steps\" per day as noted by step tracker)     Environmental/Social: 1/2 the week she lives alone, other 1/2 with mother           NUTRITION DIAGNOSIS: Overweight/obesity related to excessive energy intake as evidenced by BMI        NUTRITION INTERVENTION:   Reduce kcal and carbohydrate intake.  Limit sugary sweets/drinks.        PATIENT GOALS:  Lose 10 lbs over next month - met  Start cooking meals at home - mat  Do chair exercises daily - not met  Clean up 1 small section of house - met      Specific tips and techniques to facilitate compliance with above recommendations were provided and discussed. Pt was strongly encourage to begin making necessary changes now, and re-visit the dietitian prn. If further details are desired please feel free to contact me at [phone]. This phone number was also provided to the patient for any further questions or concerns.             Maryam Esquivel is a 63 yr old female being evaluated by a Virtual Visit (video visit) encounter to address concerns as mentioned above.  A caregiver was present when appropriate.  Due to this being a TeleHealth encounter (During QLAZV-90 public health emergency), evaluation of the following areas was limited: Nutrition Focused Physical Exam. Pursuant to the emergency declaration under the [facility] [address] waiver authority and the [employer] Act, this Virtual Visit was conducted with patient's (and/or legal guardian's) consent, to reduce the risk of exposure to COVID-19 and provide necessary medical care.      Services were provided through a video synchronous discussion virtually to substitute for in-person encounter.              Deborah Xavier, [address]

## 2021-01-26 ENCOUNTER — OFFICE VISIT (OUTPATIENT)
Dept: INTERNAL MEDICINE CLINIC | Age: 61
End: 2021-01-26
Payer: MEDICARE

## 2021-01-26 VITALS
OXYGEN SATURATION: 95 % | RESPIRATION RATE: 16 BRPM | HEART RATE: 101 BPM | DIASTOLIC BLOOD PRESSURE: 82 MMHG | BODY MASS INDEX: 46.96 KG/M2 | WEIGHT: 255.2 LBS | SYSTOLIC BLOOD PRESSURE: 130 MMHG | HEIGHT: 62 IN | TEMPERATURE: 97.5 F

## 2021-01-26 DIAGNOSIS — H60.11 CELLULITIS OF EARLOBE, RIGHT: Primary | ICD-10-CM

## 2021-01-26 PROCEDURE — G9717 DOC PT DX DEP/BP F/U NT REQ: HCPCS | Performed by: INTERNAL MEDICINE

## 2021-01-26 PROCEDURE — 99214 OFFICE O/P EST MOD 30 MIN: CPT | Performed by: INTERNAL MEDICINE

## 2021-01-26 PROCEDURE — G8427 DOCREV CUR MEDS BY ELIG CLIN: HCPCS | Performed by: INTERNAL MEDICINE

## 2021-01-26 PROCEDURE — 3017F COLORECTAL CA SCREEN DOC REV: CPT | Performed by: INTERNAL MEDICINE

## 2021-01-26 PROCEDURE — G0463 HOSPITAL OUTPT CLINIC VISIT: HCPCS | Performed by: INTERNAL MEDICINE

## 2021-01-26 PROCEDURE — G8417 CALC BMI ABV UP PARAM F/U: HCPCS | Performed by: INTERNAL MEDICINE

## 2021-01-26 RX ORDER — CEPHALEXIN 500 MG/1
500 CAPSULE ORAL 2 TIMES DAILY
Qty: 14 CAP | Refills: 0 | Status: SHIPPED | OUTPATIENT
Start: 2021-01-26 | End: 2021-02-02

## 2021-01-26 NOTE — PROGRESS NOTES
Chief Complaint   Patient presents with    Cyst     back of ear (right)     Reviewed record in preparation for visit and have obtained necessary documentation. Identified pt with two pt identifiers(name and ).       Health Maintenance Due   Topic    COVID-19 Vaccine (1 of 2)    Shingrix Vaccine Age 49> (1 of 2)    Colorectal Cancer Screening Combo     Medicare Yearly Exam     Flu Vaccine (1)         Chief Complaint   Patient presents with    Cyst     back of ear (right)        Wt Readings from Last 3 Encounters:   21 255 lb 3.2 oz (115.8 kg)   21 265 lb (120.2 kg)   20 277 lb (125.6 kg)     Temp Readings from Last 3 Encounters:   21 97.5 °F (36.4 °C) (Temporal)   20 97.5 °F (36.4 °C)   20 (!) 95.4 °F (35.2 °C) (Oral)     BP Readings from Last 3 Encounters:   21 130/82   20 130/75   20 140/90     Pulse Readings from Last 3 Encounters:   21 (!) 101   20 77   20 (!) 121           Learning Assessment:  :     Learning Assessment 10/18/2017 2014   PRIMARY LEARNER Patient Patient   HIGHEST LEVEL OF EDUCATION - PRIMARY LEARNER  > 4 YEARS OF COLLEGE > 4 YEARS OF COLLEGE   BARRIERS PRIMARY LEARNER NONE OTHER   CO-LEARNER CAREGIVER Yes No   CO-LEARNER NAME mark Winston -   CO-LEARNER HIGHEST LEVEL OF EDUCATION 4 YEARS OF COLLEGE -   1 Paramjit Hernandez -   PRIMARY LANGUAGE ENGLISH ENGLISH   PRIMARY LANGUAGE CO-LEARNER ENGLISH -    NEED No No   LEARNER PREFERENCE PRIMARY DEMONSTRATION DEMONSTRATION     - LISTENING   LEARNING SPECIAL TOPICS - no   ANSWERED BY self patient   RELATIONSHIP SELF SELF   ASSESSMENT COMMENT none n/a       Depression Screening:  :     3 most recent PHQ Screens 2020   Little interest or pleasure in doing things Nearly every day   Feeling down, depressed, irritable, or hopeless Nearly every day   Total Score PHQ 2 6   Trouble falling or staying asleep, or sleeping too much Not at all   Feeling tired or having little energy Nearly every day   Poor appetite, weight loss, or overeating Nearly every day   Feeling bad about yourself - or that you are a failure or have let yourself or your family down Nearly every day   Trouble concentrating on things such as school, work, reading, or watching TV Several days   Moving or speaking so slowly that other people could have noticed; or the opposite being so fidgety that others notice More than half the days   Thoughts of being better off dead, or hurting yourself in some way Not at all   PHQ 9 Score 18   How difficult have these problems made it for you to do your work, take care of your home and get along with others -       Fall Risk Assessment:  :     Fall Risk Assessment, last 12 mths 5/29/2019   Able to walk? Yes   Fall in past 12 months? No       Abuse Screening:  :     Abuse Screening Questionnaire 6/27/2017 8/6/2014   Do you ever feel afraid of your partner? N N   Are you in a relationship with someone who physically or mentally threatens you? N N   Is it safe for you to go home? Y Y       Coordination of Care Questionnaire:  :     1) Have you been to an emergency room, urgent care clinic since your last visit? no   Hospitalized since your last visit? no             2) Have you seen or consulted any other health care providers outside of [address] [place] [address] since your last visit? no  (Include any pap smears or colon screenings in this section.)    3) Do you have an Advance Directive on file? no    4) Are you interested in receiving information on Advance Directives? NO      Patient is accompanied by self I have received verbal consent from Hue Chandler to discuss any/all medical information while they are present in the room. Reviewed record  In preparation for visit and have obtained necessary documentation.

## 2021-02-03 NOTE — PROGRESS NOTES
Acute Care Note    Karthik Lopez is 63 y.o. female. she presents for evaluation of Cyst (back of ear (right))      The patient presents with complaints of a pustule at the posterior right ear. She noted that this presented a few days ago and has since gotten tender and somewhat swollen. She has attempted to squeeze the lesion but has not had much success in doing so. She presents for evaluation of this. Prior to Admission medications    Medication Sig Start Date End Date Taking? Authorizing Provider   cephALEXin (KEFLEX) 500 mg capsule Take 1 Cap by mouth two (2) times a day for 7 days. 1/26/21 2/2/21 Yes Richard Cheng MD   levothyroxine (SYNTHROID) 25 mcg tablet Take 1 Tab by mouth nightly. 12/29/20  Yes Richard Cheng MD   acetaminophen (TYLENOL) 325 mg tablet Take 650 mg by mouth every four (4) hours as needed for Pain. Yes Provider, Historical   venlafaxine (EFFEXOR) 75 mg tablet Take 75 mg by mouth daily. Yes Provider, Historical   venlafaxine-SR (EFFEXOR XR) 150 mg capsule Take 150 mg by mouth daily. Yes Provider, Historical   risperiDONE (RISPERDAL) 3 mg tablet Take 3 mg by mouth nightly. Yes Provider, Historical   cpap machine kit by Does Not Apply route. Dx 327.23 7/13/15  Yes Ernie Jaime PA   TRAZODONE HCL (TRAZODONE PO) Take 50 mg by mouth nightly as needed. Yes Provider, Historical   lamoTRIgine (LAMICTAL) 100 mg tablet Take 200 mg by mouth nightly. Yes Provider, Historical   cholecalciferol, vitamin D3, (VITAMIN D3) 2,000 unit tab Take 4,000 Units by mouth daily.     Provider, Historical         Patient Active Problem List   Diagnosis Code    NIKOLE (obstructive sleep apnea) G47.33    Bipolar I disorder, most recent episode depressed, severe without psychotic features ([facility] [address] F31.4    Pure hypercholesterolemia E78.00    Hypothyroidism E03.9    Vitamin D deficiency E55.9    GERD (gastroesophageal reflux disease) K21.9    OA (osteoarthritis) M19.90    DDD (degenerative disc disease), lumbosacral M51.37    Symptomatic cholelithiasis K80.20    Morbid obesity (HCC) E66.01         ROS  As per HPI    Visit Vitals  /82 (BP 1 Location: Left arm, BP Patient Position: Sitting)   Pulse (!) 101   Temp 97.5 °F (36.4 °C) (Temporal)   Resp 16   Ht 5' 2.01\" (1.575 m)   Wt 255 lb 3.2 oz (115.8 kg)   SpO2 95%   BMI 46.66 kg/m²       Physical Exam  HENT:      Ears:      Comments: Swollen area behind the right auricle. Reddened and moderately tender to palpation. Appearance of a pustule noted. Attempted to express pus and only able to remove a very small amount. ASSESSMENT/PLAN  Diagnoses and all orders for this visit:    1. Cellulitis of earlobe, right  -     cephALEXin (KEFLEX) 500 mg capsule; Take 1 Cap by mouth two (2) times a day for 7 days. Advised the patient to call back or return to office if symptoms worsen/change/persist.   Discussed expected course/resolution/complications of diagnosis in detail with patient. Medication risks/benefits/costs/interactions/alternatives discussed with patient. The patient was given an after visit summary which includes diagnoses, current medications, & vitals. They expressed understanding with the diagnosis and plan.

## 2021-02-17 ENCOUNTER — HOSPITAL ENCOUNTER (OUTPATIENT)
Dept: NUTRITION | Age: 61
Discharge: HOME | End: 2021-02-17
Payer: MEDICARE

## 2021-02-17 PROCEDURE — 97803 MED NUTRITION INDIV SUBSEQ: CPT

## 2021-02-17 NOTE — PROGRESS NOTES
[facility] - Follow up Nutrition Evaluation   DATE: 2021 Time IN: 2:30pm   Time OUT: 3:00pm    Chanel Vides was informed of the inherent limitations of a virtual visit,  and has consented to a virtual therapy visit on 2021. The patient was informed that at any time during the virtual visit, they can decide to stop the virtual visit.  The patient verified that they are physically located in the [facility] for this virtual visit.      REFERRING PHYSICIAN: Daniel Foster  NAME: Chanel Vides : 1960 AGE: 60 y.o.  GENDER: female  REASON FOR VISIT: Follow up    ASSESSMENT:  Past Medical Hx:  has a past medical history of Anxiety and depression, Bipolar Disorder, DDD, lumbosacral, Foot fracture, right, GERD, History of gastritis, Hyperlipidemia, Hypothyroidism, OA, Morbid Obesity, NIKOLE on CPAP (), and Symptomatic cholelithiasis.     Pt seen 2021. Pt did not have a good month. Pt states \"things fell apart Feb 3.\" Pt was doing fair with [employer] and then we going a \"intesive\" program called \"HOW\" (Honest, Open minded and Willing). Pt \"slipped up 3 times,\" meaning she indulged in something she wasn't supposed to and was \"kicked out.\" Pt was 216 lbs in 2019. Pt had lost ~12 lbs at previous visit. Pt has not weighed since last visit in 2021. Unsure if pt has gained wt. 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 Pt states she's been \"binging off and on.\"  Most recent weight on ~2021 was 265 lbs. Pt up 61 lbs over the last 15 months. Pt had been doing a lot more cooking and meal planning, but less so since previous visit. Motivation low after being \"dropped by the sponsor.\" Pt to restart attending regular OA Zoom calls. Pt will email food logs to RD daily and RD will check weekly food log for accountability. Encouraged pt to restart planning and doing things that make her feel successful. Encouraged  short walks outside. Pt has been mostly inside due to [employer]. \" Unable to visit her mother like she usually does due to mother quarantining and weather preventing visits. This has been difficult for pt. Pt had large portion of diet soda this morning and had 6 cupcakes and then 4 oz of ground beef today.       LABS:   Lab Results   Component Value Date/Time    Hemoglobin A1c 5.4 10/21/2016 11:04 AM       MEDICATIONS/SUPPLEMENTS:   [unfilled]  Prior to Admission medications    Medication Sig Start Date End Date Taking? Authorizing Provider   levothyroxine (SYNTHROID) 25 mcg tablet Take 1 Tab by mouth nightly. 12/29/20   Nisa Esquivel MD   cholecalciferol, vitamin D3, (VITAMIN D3) 2,000 unit tab Take 4,000 Units by mouth daily. Provider, Historical   acetaminophen (TYLENOL) 325 mg tablet Take 650 mg by mouth every four (4) hours as needed for Pain. Provider, Historical   venlafaxine (EFFEXOR) 75 mg tablet Take 75 mg by mouth daily. Provider, Historical   venlafaxine-SR (EFFEXOR XR) 150 mg capsule Take 150 mg by mouth daily. Provider, Historical   risperiDONE (RISPERDAL) 3 mg tablet Take 3 mg by mouth nightly. Provider, Historical   cpap machine kit by Does Not Apply route. Dx 327.23 7/13/15   LEONOR Graham   TRAZODONE HCL (TRAZODONE PO) Take 50 mg by mouth nightly as needed. Provider, Historical   lamoTRIgine (LAMICTAL) 100 mg tablet Take 200 mg by mouth nightly. Provider, Historical       FOOD ALLERGIES/INTOLERANCES: none    ANTHROPOMETRICS:    Ht Readings from Last 1 Encounters:   01/26/21 5' 2.01\" (1.575 m)      Wt Readings from Last 1 Encounters:   01/26/21 115.8 kg (255 lb 3.2 oz)         BMI: There is no height or weight on file to calculate BMI.  Category: Obese    Reported Wt Hx:    Estimated Nutritional Needs:   Kcals/day: 1807 (BMR x 1.3) -500  Protein: 125    Fluid: 1800      Reported Diet Hx:     24 Hour Diet Recall  Breakfast  Diet soda   Lunch  6 cup cakes, 4 oz ground beef   Dinner      Corporation, water     Exercise/Physical Actvity: none    Environmental/Social: mostly alone        NUTRITION DIAGNOSIS: Overweight/obesity related to excessive energy intake as evidenced by BMI    NUTRITION INTERVENTION:   Reduce kcal and carbohydrate intake. Limit sugary sweets/drinks. PATIENT GOALS:  Restart OA Zoom calls  Wt goal- not met  Log food- email to RD. Specific tips and techniques to facilitate compliance with above recommendations were provided and discussed. Pt was strongly encourage to begin making necessary changes now, and re-visit the dietitian prn. If further details are desired please feel free to contact me at [phone]. This phone number was also provided to the patient for any further questions or concerns. Joseph Vides is a 63 yr old female being evaluated by a Virtual Visit (video visit) encounter to address concerns as mentioned above. A caregiver was present when appropriate. Due to this being a TeleHealth encounter (During [facility]- public health emergency), evaluation of the following areas was limited: Nutrition Focused Physical Exam. Pursuant to the emergency declaration under the [facility], [address] [place] [address] authority and the [employer] and [employer] General Act, this Virtual Visit was conducted with patient's (and/or legal guardian's) consent, to reduce the risk of exposure to COVID-19 and provide necessary medical care. Services were provided through a video synchronous discussion virtually to substitute for in-person encounter.           Erin Upton RD

## 2021-04-21 ENCOUNTER — HOSPITAL ENCOUNTER (OUTPATIENT)
Dept: NUTRITION | Age: 61
Discharge: HOME | End: 2021-04-21
Payer: MEDICARE

## 2021-04-21 PROCEDURE — 97803 MED NUTRITION INDIV SUBSEQ: CPT

## 2021-04-21 NOTE — TELEMEDICINE
DATE: 2021 Time IN: 1:15 pm   Time OUT: 1:45pm     Giuliana Sapp was informed of the inherent limitations of a virtual visit,  and has consented to a virtual therapy visit on 2021. The patient was informed that at any time during the virtual visit, they can decide to stop the virtual visit. Valerie Bermudez patient verified that they are physically located in the [facility] for this virtual visit.        REFERRING PHYSICIAN: Hua Fritz  NAME: Giuliana Sapp : 1960 AGE: 63 y.o. GENDER: female  REASON FOR VISIT: Follow up     ASSESSMENT:  Past Medical Hx:  has a past medical history of Anxiety and depression, Bipolar Disorder, DDD, lumbosacral, Foot fracture, right, GERD, History of gastritis, Hyperlipidemia, Hypothyroidism, OA, Morbid Obesity, NIKOLE on CPAP (), and Symptomatic cholelithiasis.     Pt seen today (2021) after missing one appointment. Pt has been attending Zoom meetings for [employer]. Pt states she is down 3 lbs from weight ~1 month ago. Currently 254 lbs \"yesterday\" (2021). Pt states she went for a walk yesterday- forgot her phone at work so she had to walk ~8 blocks to get her phone. Pt up 61 lbs over the last 15 months, down 3 over last month. Pt has been eating \"whatever I want, whenever I want. \" Pt has been eating more foods from mother/aunt/sister prepared. Pt will email food logs to RD weekly and RD will check food log for accountability. Encouraged pt to restart planning and doing things that make her feel successful. Encouraged short walks outside. Goal is to walk 3 days a week. Pts mother was in the hospital and other family member was ill. Better now. This has been difficult for pt. Pt not sleeping well.  Stayed up \"all night\" last night.     Pt had protein bar for breakfast w diet soda, sandwich (meat, cheese, tom), homemade meatloaf.       LABS:         Lab Results   Component Value Date/Time     Hemoglobin A1c 5.4 10/21/2016 11:04 AM       MEDICATIONS/SUPPLEMENTS:   [unfilled]          Prior to Admission medications    Medication Sig Start Date End Date Taking? Authorizing Provider   levothyroxine (SYNTHROID) 25 mcg tablet Take 1 Tab by mouth nightly. 12/29/20     Yasmine Coronel MD   cholecalciferol, vitamin D3, (VITAMIN D3) 2,000 unit tab Take 4,000 Units by mouth daily.       Provider, Historical   acetaminophen (TYLENOL) 325 mg tablet Take 650 mg by mouth every four (4) hours as needed for Pain.       Provider, Historical   venlafaxine (EFFEXOR) 75 mg tablet Take 75 mg by mouth daily.       Provider, Historical   venlafaxine-SR (EFFEXOR XR) 150 mg capsule Take 150 mg by mouth daily.       Provider, Historical   risperiDONE (RISPERDAL) 3 mg tablet Take 3 mg by mouth nightly.       Provider, Historical   cpap machine kit by Does Not Apply route. Dx 327.23 7/13/15     [place]   TRAZODONE HCL (TRAZODONE PO) Take 50 mg by mouth nightly as needed.       Provider, Historical   lamoTRIgine (LAMICTAL) 100 mg tablet Take 200 mg by mouth nightly.       Provider, Historical         FOOD ALLERGIES/INTOLERANCES: none     ANTHROPOMETRICS:        Ht Readings from Last 1 Encounters:   01/26/21 5' 2.01\" (1.575 m)          Wt Readings from Last 1 Encounters:   04/21/2021 115.8 kg (254 lb 3.2 oz)                               BMI: 46.6. Category: Obese     Reported Wt Hx: \"down 3 lbs\"     Estimated Nutritional Needs:   Kcals/day: 1807 (BMR x 1.3) -500  Protein: 125    Fluid: 1800        Reported Diet Hx:                 24 Hour Diet Recall  Breakfast   Protein bar, Diet soda   Lunch   sandwich   Dinner    meatloaf   Snacks       Beverages   Soda, water      Exercise/Physical Actvity: walk yesterday     Environmental/Social: mostly alone           NUTRITION DIAGNOSIS: Overweight/obesity related to excessive energy intake as evidenced by BMI     NUTRITION INTERVENTION:   Reduce kcal and carbohydrate intake.  Limit sugary sweets/drinks.     PATIENT GOALS:  Start walking 3x per week. Wt goal- down 3 lbs. Log food- email to RD - restart     Specific tips and techniques to facilitate compliance with above recommendations were provided and discussed. Pt was strongly encourage to begin making necessary changes now, and re-visit the dietitian prn. If further details are desired please feel free to contact me at [phone]. This phone number was also provided to the patient for any further questions or concerns.             Chantelle Rossi a 63 yr old female being evaluated by a Virtual Visit (video visit) encounter to address concerns as mentioned above.  A caregiver was present when appropriate.  Due to this being a TeleHealth encounter (During [facility] public health emergency), evaluation of the following areas was limited: Nutrition Focused Physical Exam. Pursuant to the emergency declaration under the [facility], [facility]5 waiver authority and the [employer] and [employer] General Act, this Virtual Visit was conducted with patient's (and/or legal guardian's) consent, to reduce the risk of exposure to COVID-19 and provide necessary medical care.      Services were provided through a video synchronous discussion virtually to substitute for in-person encounter.              Afua Lynch, [address]

## 2021-05-26 ENCOUNTER — HOSPITAL ENCOUNTER (OUTPATIENT)
Dept: NUTRITION | Age: 61
Discharge: HOME | End: 2021-05-26
Payer: MEDICARE

## 2021-05-26 PROCEDURE — 97803 MED NUTRITION INDIV SUBSEQ: CPT

## 2021-05-26 NOTE — TELEMEDICINE
1211   Assessment - Follow up Nutrition Evaluation   DATE: 2021 Time IN: 11:05   Time OUT: 11:35    Seble Bernabe informed of the inherent limitations of a virtual visit,  and has consented to a virtual therapy visit on 2021. The patient was informed that at any time during the virtual visit, they can decide to stop the virtual visit. Kenia Smith patient verified that they are physically located in the [facility] for this virtual visit. REFERRING PHYSICIAN: Maddie Stern  NAME: Irina Huffman : 1960 AGE: 63 y.o. GENDER: female  REASON FOR VISIT: Follow up    ASSESSMENT:  Past Medical Hx: has a past medical history of Anxiety and depression, Bipolar Disorder, DDD, lumbosacral, Foot fracture, right, GERD, History of gastritis, Hyperlipidemia, Hypothyroidism, OA, Morbid Obesity, NIKOLE on CPAP (), and Symptomatic cholelithiasis. Pt seen or follow up. Pt has a new sponsor for [email or web address]. 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 Pt has been doing more daily movement only related to job location move. No additional exercise or intentional walking recently. Pt with same work/weekend schedule. Pt stays up very late into the morning due to inability to sleep from mind racing. Pt does a little better on work days due to needing to get up and be at work at a certain time. Recommend continuing sleep schedule all week and avoiding excessive screen time at time. Pt tends to watch TV or read late into the night as form of escape. Pt continues with jostin eating ice cream at home or crackers/bread products at home/at moms house. Pt has been missing meals sometimes due to sleeping late. Focused on getting good sleep schedule established which will help with decision making, energy, and avoiding trigger foods.     LABS:   Lab Results   Component Value Date/Time    Hemoglobin A1c 5.4 10/21/2016 11:04 AM       MEDICATIONS/SUPPLEMENTS:   [unfilled]  Prior to Admission medications    Medication Sig Start Date End Date Taking? Authorizing Provider   levothyroxine (SYNTHROID) 25 mcg tablet Take 1 Tab by mouth nightly. 12/29/20   Eunice Dobosn MD   cholecalciferol, vitamin D3, (VITAMIN D3) 2,000 unit tab Take 4,000 Units by mouth daily. Provider, Historical   acetaminophen (TYLENOL) 325 mg tablet Take 650 mg by mouth every four (4) hours as needed for Pain. Provider, Historical   venlafaxine (EFFEXOR) 75 mg tablet Take 75 mg by mouth daily. Provider, Historical   venlafaxine-SR (EFFEXOR XR) 150 mg capsule Take 150 mg by mouth daily. Provider, Historical   risperiDONE (RISPERDAL) 3 mg tablet Take 3 mg by mouth nightly. Provider, Historical   cpap machine kit by Does Not Apply route. Dx 327.23 7/13/15   LEONOR Mclaughlin   TRAZODONE HCL (TRAZODONE PO) Take 50 mg by mouth nightly as needed. Provider, Historical   lamoTRIgine (LAMICTAL) 100 mg tablet Take 200 mg by mouth nightly. Provider, Historical       FOOD ALLERGIES/INTOLERANCES: none    ANTHROPOMETRICS:    Ht Readings from Last 1 Encounters:   01/26/21 5' 2.01\" (1.575 m)      Wt Readings from Last 1 Encounters:   01/26/21 254 lb          BMI: 46.6 Category: Obese    Reported Wt Hx:    Estimated Nutritional Needs:   Kcals/day: 1807 (BMR x 1.3) -500  Protein: 125    Fluid: 1800      Exercise/Physical Actvity: none    Environmental/Social: lonely,         NUTRITION DIAGNOSIS:   Overweight/obesity related to excessive energy intake as evidenced by BMI    NUTRITION INTERVENTION:   Focus on sleep habits. 7-8 hrs per night, bed/wake time same all week long    PATIENT GOALS:  Get on normalized sleep schedule  Log food and email to RD  Weigh self x2 per week. Specific tips and techniques to facilitate compliance with above recommendations were provided and discussed.   Pt was strongly encourage to begin making necessary changes now, and re-visit the dietitian prn. If further details are desired please feel free to contact me at [phone]. This phone number was also provided to the patient for any further questions or concerns. Erik Vides is a 63 yr old being evaluated by a Virtual Visit (video visit) encounter to address concerns as mentioned above. A caregiver was present when appropriate. Due to this being a TeleHealth encounter (During [facility]-11 public health emergency), evaluation of the following areas was limited: Nutrition Focused Physical Exam. Pursuant to the emergency declaration under the [facility], [address] [place] [address] authority and the [employer] and [employer] Act, this Virtual Visit was conducted with patient's (and/or legal guardian's) consent, to reduce the risk of exposure to COVID-19 and provide necessary medical care. Services were provided through a video synchronous discussion virtually to substitute for in-person encounter.           Jordan Huggins RD

## 2021-09-08 DIAGNOSIS — E03.9 ACQUIRED HYPOTHYROIDISM: ICD-10-CM

## 2021-09-09 RX ORDER — LEVOTHYROXINE SODIUM 25 UG/1
25 TABLET ORAL
Qty: 90 TABLET | Refills: 0 | Status: SHIPPED | OUTPATIENT
Start: 2021-09-09 | End: 2022-06-06 | Stop reason: SDUPTHER

## 2021-10-04 ENCOUNTER — OFFICE VISIT (OUTPATIENT)
Dept: INTERNAL MEDICINE CLINIC | Age: 61
End: 2021-10-04
Payer: MEDICARE

## 2021-10-04 VITALS
HEART RATE: 67 BPM | SYSTOLIC BLOOD PRESSURE: 140 MMHG | RESPIRATION RATE: 16 BRPM | WEIGHT: 250 LBS | DIASTOLIC BLOOD PRESSURE: 78 MMHG | HEIGHT: 62 IN | OXYGEN SATURATION: 96 % | TEMPERATURE: 97.5 F | BODY MASS INDEX: 46.01 KG/M2

## 2021-10-04 DIAGNOSIS — Z23 ENCOUNTER FOR IMMUNIZATION: ICD-10-CM

## 2021-10-04 DIAGNOSIS — Z01.818 PREOP EXAM FOR INTERNAL MEDICINE: Primary | ICD-10-CM

## 2021-10-04 DIAGNOSIS — F31.4 BIPOLAR I DISORDER, MOST RECENT EPISODE DEPRESSED, SEVERE WITHOUT PSYCHOTIC FEATURES (HCC): ICD-10-CM

## 2021-10-04 DIAGNOSIS — B36.9 FUNGAL RASH OF TRUNK: ICD-10-CM

## 2021-10-04 DIAGNOSIS — E66.01 MORBID OBESITY WITH BMI OF 45.0-49.9, ADULT (HCC): ICD-10-CM

## 2021-10-04 DIAGNOSIS — Z12.31 SCREENING MAMMOGRAM FOR BREAST CANCER: ICD-10-CM

## 2021-10-04 PROCEDURE — G9717 DOC PT DX DEP/BP F/U NT REQ: HCPCS | Performed by: INTERNAL MEDICINE

## 2021-10-04 PROCEDURE — G9899 SCRN MAM PERF RSLTS DOC: HCPCS | Performed by: INTERNAL MEDICINE

## 2021-10-04 PROCEDURE — 99213 OFFICE O/P EST LOW 20 MIN: CPT | Performed by: INTERNAL MEDICINE

## 2021-10-04 PROCEDURE — G8417 CALC BMI ABV UP PARAM F/U: HCPCS | Performed by: INTERNAL MEDICINE

## 2021-10-04 PROCEDURE — 90686 IIV4 VACC NO PRSV 0.5 ML IM: CPT | Performed by: INTERNAL MEDICINE

## 2021-10-04 PROCEDURE — G8427 DOCREV CUR MEDS BY ELIG CLIN: HCPCS | Performed by: INTERNAL MEDICINE

## 2021-10-04 PROCEDURE — G0463 HOSPITAL OUTPT CLINIC VISIT: HCPCS | Performed by: INTERNAL MEDICINE

## 2021-10-04 PROCEDURE — 3017F COLORECTAL CA SCREEN DOC REV: CPT | Performed by: INTERNAL MEDICINE

## 2021-10-04 RX ORDER — NYSTATIN 100000 [USP'U]/G
POWDER TOPICAL 3 TIMES DAILY
Qty: 60 G | Refills: 0 | Status: SHIPPED | OUTPATIENT
Start: 2021-10-04 | End: 2022-03-16

## 2021-10-04 NOTE — PROGRESS NOTES
Preoperative Asessment    Marquise Quiles is 57 y.o. female (1960) who presents for preoperative evaluation. Procedure/Surgery: Left knee replacement   Date of Procedure/Surgery: 10/25/21   Surgeon: Dr. Ines Saldivar: [facility]  Primary Physician: Dorotha Kussmaul. MD    She has a rash underneath her pannus. Noted that this has been there for the past month. Painful at times. She is obese--we discussed how this could represent a fungal rash. She has no other complaints other than left knee pain    No chest pain, no shortness of breath. Patient Active Problem List   Diagnosis Code    NIKOLE (obstructive sleep apnea) G47.33    Bipolar I disorder, most recent episode depressed, severe without psychotic features ([address] F31.4    Pure hypercholesterolemia E78.00    Hypothyroidism E03.9    Vitamin D deficiency E55.9    GERD (gastroesophageal reflux disease) K21.9    OA (osteoarthritis) M19.90    DDD (degenerative disc disease), lumbosacral M51.37    Symptomatic cholelithiasis K80.20    Morbid obesity ([address] E66.01       Prior to Admission medications    Medication Sig Start Date End Date Taking? Authorizing Provider   levothyroxine (SYNTHROID) 25 mcg tablet Take 1 Tablet by mouth nightly. 9/9/21  Yes Kathyanne Collet, MD   cholecalciferol, vitamin D3, (VITAMIN D3) 2,000 unit tab Take 4,000 Units by mouth daily. Yes Provider, Historical   acetaminophen (TYLENOL) 325 mg tablet Take 650 mg by mouth every four (4) hours as needed for Pain. Yes Provider, Historical   venlafaxine (EFFEXOR) 75 mg tablet Take 75 mg by mouth daily. Yes Provider, Historical   venlafaxine-SR (EFFEXOR XR) 150 mg capsule Take 150 mg by mouth daily. Yes Provider, Historical   risperiDONE (RISPERDAL) 3 mg tablet Take 3 mg by mouth nightly. Yes Provider, Historical   cpap machine kit by Does Not Apply route.  Dx 327.23 7/13/15  Yes LEONOR Rodriguez HCL (TRAZODONE PO) Take 50 mg by mouth nightly as needed. Yes Provider, Historical   lamoTRIgine (LAMICTAL) 100 mg tablet Take 200 mg by mouth nightly. Yes Provider, Historical       Review of Systems   Skin: Positive for rash. Latex Allergy: None  Recent use of: No recent use of aspirin (ASA), NSAIDS or steroids  Tetanus up to date: last tetanus booster within 10 years  Anesthesia Complications: None  History of abnormal bleeding : None  History of Blood Transfusions: no  Health Care Directive or Living Will: no    Visit Vitals  BP (!) 140/78 (BP 1 Location: Left arm, BP Patient Position: Sitting, BP Cuff Size: Large adult)   Pulse 67   Temp 97.5 °F (36.4 °C) (Temporal)   Resp 16   Ht 5' 2\" (1.575 m)   Wt 250 lb (113.4 kg)   SpO2 96%   BMI 45.73 kg/m²         Physical Exam  Constitutional:       Appearance: Normal appearance. Cardiovascular:      Rate and Rhythm: Normal rate and regular rhythm. Pulmonary:      Effort: Pulmonary effort is normal.      Breath sounds: Normal breath sounds. Skin:     Findings: Rash (mildly reddish rash on her pannus) present. Neurological:      Mental Status: She is alert. **Pre procedure evaluation to be done at [facility] per documentation--to include labs and EKG      Diagnoses and all orders for this visit:    1. Preop exam for internal medicine    2. Encounter for immunization  -     INFLUENZA VIRUS VAC QUAD,SPLIT,PRESV FREE SYRINGE IM    3. Screening mammogram for breast cancer  -     [facility] 3D SONY W MAMMO BI SCREENING INCL CAD; Future    4. Fungal rash of trunk  -     nystatin (MYCOSTATIN) powder; Apply  to affected area three (3) times daily. 5. Bipolar I disorder, most recent episode depressed, severe without psychotic features ([address]    6. Morbid obesity with BMI of 45.0-49.9, adult ([address]        After reviewing the patient's history & exam she is low risk for cardiac complications.   No contraindications to planned surgery provided her hospital preop labs and EKG are unremarkable.

## 2021-10-04 NOTE — PROGRESS NOTES
Verified name and birth date for privacy precautions. Chart reviewed in preparation for today's visit.      Chief Complaint   Patient presents with    Pre-op Exam     left knee replacement 10/25/21            Health Maintenance Due   Topic    COVID-19 Vaccine (1)    Cervical cancer screen     Shingrix Vaccine Age 50> (1 of 2)    Colorectal Cancer Screening Combo     Medicare Yearly Exam     Breast Cancer Screen Mammogram     Flu Vaccine (1)         Wt Readings from Last 3 Encounters:   10/04/21 250 lb (113.4 kg)   01/26/21 255 lb 3.2 oz (115.8 kg)   01/19/21 265 lb (120.2 kg)     Temp Readings from Last 3 Encounters:   10/04/21 97.5 °F (36.4 °C) (Temporal)   01/26/21 97.5 °F (36.4 °C) (Temporal)   09/23/20 97.5 °F (36.4 °C)     BP Readings from Last 3 Encounters:   10/04/21 (!) 140/78   01/26/21 130/82   09/23/20 130/75     Pulse Readings from Last 3 Encounters:   10/04/21 67   01/26/21 (!) 101   09/23/20 77         Learning Assessment:  :     Learning Assessment 10/18/2017 8/6/2014   PRIMARY LEARNER Patient Patient   HIGHEST LEVEL OF EDUCATION - PRIMARY LEARNER  > 4 YEARS OF COLLEGE > 4 YEARS OF COLLEGE   BARRIERS PRIMARY LEARNER NONE OTHER   CO-LEARNER CAREGIVER Yes No   CO-LEARNER NAME mark Winston -   CO-LEARNER HIGHEST LEVEL OF EDUCATION 4 YEARS OF COLLEGE -   1 Paramjit Hernandez -   PRIMARY LANGUAGE ENGLISH ENGLISH   PRIMARY LANGUAGE CO-LEARNER ENGLISH -    NEED No No   LEARNER PREFERENCE PRIMARY DEMONSTRATION DEMONSTRATION     - LISTENING   LEARNING SPECIAL TOPICS - no   ANSWERED BY self patient   RELATIONSHIP SELF SELF   ASSESSMENT COMMENT none n/a       Depression Screening:  :     3 most recent PHQ Screens 10/4/2021   Little interest or pleasure in doing things Not at all   Feeling down, depressed, irritable, or hopeless Not at all   Total Score PHQ 2 0   Trouble falling or staying asleep, or sleeping too much -   Feeling tired or having little energy -   Poor appetite, weight loss, or overeating -   Feeling bad about yourself - or that you are a failure or have let yourself or your family down -   Trouble concentrating on things such as school, work, reading, or watching TV -   Moving or speaking so slowly that other people could have noticed; or the opposite being so fidgety that others notice -   Thoughts of being better off dead, or hurting yourself in some way -   PHQ 9 Score -   How difficult have these problems made it for you to do your work, take care of your home and get along with others -       Fall Risk Assessment:  :     Fall Risk Assessment, last 12 mths 5/29/2019   Able to walk? Yes   Fall in past 12 months? No       Abuse Screening:  :     Abuse Screening Questionnaire 10/4/2021 6/27/2017 8/6/2014   Do you ever feel afraid of your partner? N N N   Are you in a relationship with someone who physically or mentally threatens you? N N N   Is it safe for you to go home?  Marlon Reese

## 2021-10-12 ENCOUNTER — TRANSCRIBE ORDER (OUTPATIENT)
Dept: REGISTRATION | Age: 61
End: 2021-10-12

## 2021-10-12 DIAGNOSIS — Z01.812 PRE-PROCEDURE LAB EXAM: Primary | ICD-10-CM

## 2021-10-18 ENCOUNTER — TELEPHONE (OUTPATIENT)
Dept: INTERNAL MEDICINE CLINIC | Age: 61
End: 2021-10-18

## 2021-10-18 NOTE — TELEPHONE ENCOUNTER
Reason for call:  Please fax pre op info fax [phone]  atten Dr Lyman Nones    Is this a new problem: yes     Date of last appointment:  10/4/2021     Can we respond via MyChart: no    Best call back number:  Latosha Weiner

## 2021-10-18 NOTE — H&P
Freddy Huff  : 1960  Single / Language: Antion[place]key English / Race: White  Female      History of Present Illness  The patient is a 57year old female who presents with a complaint of Knee Pain. The onset of the knee pain has been gradual and has been occurring in a persistent pattern for years. The course has been worsening. The knee pain is severe. The knee pain is characterized as a sharp stabbing. The knee pain is described as being located in the entire knee (bilateral). The knee pain is aggravated by squatting, kneeling, climbing, stairs and prolonged standing. The knee pain is relieved by rest and ice. Note for \"Knee Pain\": Patient is here today to discuss knee replacement.  Both knees are seriously arthritic.  I have seen her for 2 years. Cici Fajardo is gotten to the point now she has to use a walker to walk and things are very painful.  Has a sedentary job. "[employer] not work from home. Cici Fajardo was referred by her primary care to discuss her options.       Problem List/Past Medical   DIETARY COUNSELING (V65.3)    Disc degeneration of lumbar region (722.52  M51.36)    Hip pain, acute, right (719.45  M25.551)    Lumbar radiculitis (724.4  M54.16)    Sciatica of right side associated with disorder of lumbosacral spine (724.3  M54.31)    Knee pain, left anterior (719.46  M25.562)    Sciatica associated with disorder of lumbar spine (724.3  M53.86)    REVIEW OF SYSTEMS: Systems were reviewed by the provider.    Left foot pain (729.5  M79.672)    Primary localized osteoarthritis of both knees (715.16  M17.0)    Bilateral knee effusions (719.06  M25.461, M25.462)    Right Partial Rotator Cuff Tear (840.4  M75.111)    Rotator cuff impingement syndrome of right shoulder (726.10  M75.41)    Corns and callus (700  L84)    OA, KNEE (715.16)    Morbidly obese (278.01  E66.01)    Bilateral chronic knee pain (719.46  M25.561, M25.562)    Pain of right shoulder region (719.41  M25.511)    Weight above 97th percentile (V49.89  Z78.9)    Acquired deformities of toe, left (735.9  M20.62)      Allergies  Flagyl *ANTI-INFECTIVE AGENTS - MISC. *   severe mental distress, headaches and irritability  Allergies Reconciled      Family History  Seizure disorder   Brother. Respiratory Condition   Father. Depression   Father. Heart disease in female family member before age 72    Heart disease in male family member before age 54    Hypercholesterolemia   Father. Heart Disease   Father. Alcohol Abuse   Father. Arthritis   Father, Mother. Social History   Marital status   single  Seat Belt Use   always  Current work status   disabled  Sun Exposure   rarely  Illicit drug use   none  Exercise   04/15/2015: Swims 1-2 times per week. Caffeine use   04/15/2015: Drinks soft drinks, 3-4 drinks per day. Alcohol use   04/15/2015: Never drinks. Tobacco use   Never smoker. Medication History   Ciprodex  (0.3-0.1% Suspension, Otic) Active. RisperiDONE  (3MG Tablet, Oral) Active. Pravastatin Sodium  (10MG Tablet, Oral) Active. Pantoprazole Sodium  (40MG Tablet DR, Oral) Active. DULoxetine HCl  (30MG Capsule DR Part, Oral) Active. TraZODone HCl  (50MG Tablet, Oral) Active. Venlafaxine HCl ER  (150MG Capsule ER 24HR, Oral) Active. Medications Reconciled     Pregnancy / Birth History  Pregnant   no    Past Surgical History   No pertinent past surgical history    Colon Polyp Removal - Colonoscopy      Other Problems  Gastroesophageal Reflux Disease    Bladder Problems    Psychiatric disorder    Anxiety Disorder    Hypercholesterolemia    Depression    Unspecified Diagnosis    Treatment options were discussed with the patient in full.          Review of Systems  General Present- Fatigue. Not Present- Appetite Loss, Chills, Fever, Night Sweats, Weight Gain and Weight Loss. Female Genitourinary Present- Pelvic Pain and Urgency.  Not Present- Blood in Urine, Frequency, Painful Urination and Trouble Starting Urinary Stream.  Musculoskeletal Present- Back Pain, Joint Pain and Joint Stiffness. Not Present- Joint Swelling. Neurological Present- Unsteadiness. Not Present- Fainting, Headaches, Memory Loss, Numbness, Seizures, Tingling, Tremor and Weakness. Psychiatric Present- Anxiety and Depression. Not Present- Bipolar. Vitals  Weight: 256 lb   Height: 62 in   Weight was reported by patient. Height was reported by patient. Body Surface Area: 2.12 m²   Body Mass Index: 46.82 kg/m²      Physical Exam  Musculoskeletal  Global Assessment  Examination of related systems reveals - well-developed, well-nourished, in no acute distress, alert and oriented x 3. Right Lower Extremity - Note: Hip was examined and has painless range of motion with negative impingement and apprehension sign. Straight leg raise and femoral nerve stretch test are negative. Lower extremity has palpable dorsalis pedis pulse. Skin is intact and foot is sensate and well-perfused. Skin is intact over her knee. Knee extends near full flexes to 120 degrees. Varus malalignment. Medial joint line tenderness. Motor testing reveals 5 out of 5 strength of the hip flexors, quads, ankle plantar flexors, and ankle dorsiflexors. Left Lower Extremity - Note: Hip was examined and has painless range of motion with negative impingement and apprehension sign. Straight leg raise and femoral nerve stretch test are negative. Lower extremity has palpable dorsalis pedis pulse. Skin is intact and foot is sensate and well-perfused. Skin is intact over her knee. Knee extends near full flexes to 120 degrees. Varus malalignment. Medial joint line tenderness. Motor testing reveals 5 out of 5 strength of the hip flexors, quads, ankle plantar flexors, and ankle dorsiflexors. Assessment & Plan    Primary localized osteoarthritis of both knees (715.16  M17.0)  Impression: Severe medial compartment but tricompartmental osteoarthritis. She has subluxed and has tibial erosions now.  New x-rays today 4 views show severe bone-on-bone with subluxation large osteophytes and medial tibial erosion. Questions were answered she scheduled total knee replacement she will need both but only one of the time. Current Plans  Pt Education - How to [address] using Patient Portal and 3rd Party Apps: discussed with patient and provided information. Pt Education - Educational materials were provided.: discussed with patient and provided information. X-RAY EXAM OF KNEE 3 VIEWS (96541) (Left PA flex, Lateral and bilateral [place] Patella views were taken today using Digital Radiography.  4 x-rays total. Severe bone-on-bone.)  X-RAY EXAM OF KNEE 3 VIEWS (46258) (Right PA flex, Lateral and bilateral [place] Patella views were taken today using Digital Radiography.)    REVIEW OF SYSTEMS: Systems were reviewed by the provider.(V49.9)      Weight above 97th percentile (V49.89  Z78.9)    Current Plans  LIFESTYLE EDUCATION REGARDING DIET (58202)

## 2021-10-21 ENCOUNTER — HOSPITAL ENCOUNTER (OUTPATIENT)
Dept: PREADMISSION TESTING | Age: 61
Discharge: HOME | End: 2021-10-21
Payer: MEDICARE

## 2021-10-21 VITALS
HEIGHT: 62 IN | DIASTOLIC BLOOD PRESSURE: 71 MMHG | SYSTOLIC BLOOD PRESSURE: 119 MMHG | BODY MASS INDEX: 47.42 KG/M2 | WEIGHT: 257.7 LBS | TEMPERATURE: 98 F | RESPIRATION RATE: 16 BRPM | HEART RATE: 76 BPM

## 2021-10-21 DIAGNOSIS — Z01.812 PRE-PROCEDURE LAB EXAM: ICD-10-CM

## 2021-10-21 LAB
ABO + RH BLD: NORMAL
ANION GAP SERPL CALC-SCNC: 4 MMOL/L (ref 5–15)
APPEARANCE UR: CLEAR
ATRIAL RATE: 76 BPM
BACTERIA URNS QL MICRO: NEGATIVE /HPF
BILIRUB UR QL: NEGATIVE
BLOOD GROUP ANTIBODIES SERPL: NORMAL
BUN SERPL-MCNC: 12 MG/DL (ref 6–20)
BUN/CREAT SERPL: 14 (ref 12–20)
CALCIUM SERPL-MCNC: 9 MG/DL (ref 8.5–10.1)
CALCULATED P AXIS, ECG09: 47 DEGREES
CALCULATED R AXIS, ECG10: 7 DEGREES
CALCULATED T AXIS, ECG11: 43 DEGREES
CHLORIDE SERPL-SCNC: 107 MMOL/L (ref 97–108)
CO2 SERPL-SCNC: 28 MMOL/L (ref 21–32)
COLOR UR: ABNORMAL
CREAT SERPL-MCNC: 0.87 MG/DL (ref 0.55–1.02)
DIAGNOSIS, 93000: NORMAL
EPITH CASTS URNS QL MICRO: ABNORMAL /LPF
ERYTHROCYTE [DISTWIDTH] IN BLOOD BY AUTOMATED COUNT: 13.8 % (ref 11.5–14.5)
EST. AVERAGE GLUCOSE BLD GHB EST-MCNC: 103 MG/DL
GLUCOSE SERPL-MCNC: 106 MG/DL (ref 65–100)
GLUCOSE UR STRIP.AUTO-MCNC: NEGATIVE MG/DL
HBA1C MFR BLD: 5.2 % (ref 4–5.6)
HCT VFR BLD AUTO: 39.2 % (ref 35–47)
HGB BLD-MCNC: 13 G/DL (ref 11.5–16)
HGB UR QL STRIP: NEGATIVE
HYALINE CASTS URNS QL MICRO: ABNORMAL /LPF (ref 0–5)
INR PPP: 0.9 (ref 0.9–1.1)
KETONES UR QL STRIP.AUTO: NEGATIVE MG/DL
LEUKOCYTE ESTERASE UR QL STRIP.AUTO: ABNORMAL
MCH RBC QN AUTO: 30 PG (ref 26–34)
MCHC RBC AUTO-ENTMCNC: 33.2 G/DL (ref 30–36.5)
MCV RBC AUTO: 90.3 FL (ref 80–99)
NITRITE UR QL STRIP.AUTO: NEGATIVE
NRBC # BLD: 0 K/UL (ref 0–0.01)
NRBC BLD-RTO: 0 PER 100 WBC
P-R INTERVAL, ECG05: 210 MS
PH UR STRIP: 5.5 [PH] (ref 5–8)
PLATELET # BLD AUTO: 199 K/UL (ref 150–400)
PMV BLD AUTO: 10.5 FL (ref 8.9–12.9)
POTASSIUM SERPL-SCNC: 4.2 MMOL/L (ref 3.5–5.1)
PROT UR STRIP-MCNC: NEGATIVE MG/DL
PROTHROMBIN TIME: 9.8 SEC (ref 9–11.1)
Q-T INTERVAL, ECG07: 394 MS
QRS DURATION, ECG06: 78 MS
QTC CALCULATION (BEZET), ECG08: 443 MS
RBC # BLD AUTO: 4.34 M/UL (ref 3.8–5.2)
RBC #/AREA URNS HPF: ABNORMAL /HPF (ref 0–5)
SODIUM SERPL-SCNC: 139 MMOL/L (ref 136–145)
SP GR UR REFRACTOMETRY: 1 (ref 1–1.03)
SPECIMEN EXP DATE BLD: NORMAL
UA: UC IF INDICATED,UAUC: ABNORMAL
UROBILINOGEN UR QL STRIP.AUTO: 0.2 EU/DL (ref 0.2–1)
VENTRICULAR RATE, ECG03: 76 BPM
WBC # BLD AUTO: 6.7 K/UL (ref 3.6–11)
WBC URNS QL MICRO: ABNORMAL /HPF (ref 0–4)

## 2021-10-21 PROCEDURE — U0005 INFEC AGEN DETEC AMPLI PROBE: HCPCS

## 2021-10-21 PROCEDURE — 83036 HEMOGLOBIN GLYCOSYLATED A1C: CPT

## 2021-10-21 PROCEDURE — 81001 URINALYSIS AUTO W/SCOPE: CPT

## 2021-10-21 PROCEDURE — 86901 BLOOD TYPING SEROLOGIC RH(D): CPT

## 2021-10-21 PROCEDURE — 93005 ELECTROCARDIOGRAM TRACING: CPT

## 2021-10-21 PROCEDURE — 85027 COMPLETE CBC AUTOMATED: CPT

## 2021-10-21 PROCEDURE — 80048 BASIC METABOLIC PNL TOTAL CA: CPT

## 2021-10-21 PROCEDURE — 85610 PROTHROMBIN TIME: CPT

## 2021-10-21 NOTE — PERIOP NOTES
DO NOT EAT  ANYTHING AFTER MIDNIGHT, except as instructed THE NIGHT BEFORE SURGERY. PT GIVEN OPPORTUNITY TO ASK ADDITIONAL QUESTIONS. PRE-OPERATIVE INSTRUCTIONS REVIEWED WITH PATIENT. PATIENT GIVEN 2-PACK OF CHG SOAP. INSTRUCTIONS (TO BE REVIEWED IN CLASS) ON USE OF CHG SOAP. PATIENT GIVEN SSI INFECTION FAQ SHEET. MRSA / MSSA TREATMENT INSTRUCTION SHEET GIVEN WITH AN EXPLANATION TO PATIENT THAT THEY WILL BE NOTIFIED IF TREATMENT INSTRUCTIONS NEED TO BE INITIATED. PATIENT WAS GIVEN THE OPPORTUNITY TO ASK QUESTIONS ON THE INFORMATION PROVIDED. [address] [facility] APPOINTMENTS REVIEWED AND GIVEN TO PATIENT. COVID-19 INSTRUCTION SHEET ALSO REVIEWED AND GIVEN TO PATIENT.

## 2021-10-21 NOTE — PERIOP NOTES
PAT Nurse Practitioner   Pre-Operative Chart Review/Assessment:-ORTHOPEDIC                Patient Name:  Thony Dickerson                                                           Age:   57 y.o.    :  1960     Today's Date:  10/21/2021     Date of PAT:   10/21/2021      Date of Surgery:    10/25/2021      Procedure(s):  Left Total Knee Arthroplasty     Surgeon:   Dr. Jerel Aschoff                       PLAN:      1)  Medical Clearance:  Dr. Hever Gomez      2)  Cardiac Clearance:  EKG and METs reviewed. No further cardiac evaluation requested. 3)  Diabetic Treatment Consult:  Not indicated. A1c-5.2      4)  Sleep Apnea evaluation:   +NIKOLE dx. Pt uses CPAP. 5) Treatment for MRSA/Staph Aureus:  Neg      6) Additional Concerns:  GERD, fatty liver, dep/anx, bipolar, obesity                Vital Signs:         Vitals:    10/21/21 0827   BP: 119/71   Pulse: 76   Resp: 16   Temp: 98 °F (36.7 °C)   Weight: 116.9 kg (257 lb 11.2 oz)   Height: 5' 2\" (1.575 m)            ____________________________________________  PAST MEDICAL HISTORY  Past Medical History:   Diagnosis Date    Anxiety and depression     [facility]    Bipolar Disorder     [facility]    Chronic pain     LEFT KNEE    DDD (degenerative disc disease), lumbosacral     L5-S1 ([facility])    Depression     Fatty liver 2018    Foot fracture, right     h/o    Genital herpes     GERD (gastroesophageal reflux disease)     History of gastritis     Dr. Jac Bustillos Hyperlipidemia     Hypothyroidism (acquired)     Menopause     LMP- 51years old?     OA (osteoarthritis)     knees, Dr. Panda Bradford    Obesity     NIKOLE on CPAP     adherent with CPAP use    Symptomatic cholelithiasis 10/18/2017      ____________________________________________  PAST SURGICAL HISTORY  Past Surgical History:   Procedure Laterality Date    HX COLONOSCOPY  12    colon polyp, repeat in 5 yrs, Dr. Pino Lynn HX ENDOSCOPY  5/19/16    gastritis, hiatal hernia (Dr. Adam Samayoa)    HX LAP CHOLECYSTECTOMY  10/18/2018    Laparoscopic cholecystectomy by Dr. Edide Velasquez.  HX OTHER SURGICAL      cyst removed from scalp - benign      ____________________________________________  HOME MEDICATIONS  Current Outpatient Medications   Medication Sig    nystatin (MYCOSTATIN) powder Apply  to affected area three (3) times daily. (Patient taking differently: Apply  to affected area three (3) times daily as needed.)    levothyroxine (SYNTHROID) 25 mcg tablet Take 1 Tablet by mouth nightly.  cholecalciferol, vitamin D3, (VITAMIN D3) 2,000 unit tab Take 4,000 Units by mouth nightly.  acetaminophen (TYLENOL) 325 mg tablet Take 650 mg by mouth every four (4) hours as needed for Pain.  venlafaxine (EFFEXOR) 75 mg tablet Take 75 mg by mouth daily. PT TAKES A TOTAL  mg EVERY MORNING    venlafaxine-SR (EFFEXOR XR) 150 mg capsule Take 150 mg by mouth daily. PT TAKES A TOTAL OF 225mg EVERY MORNING    risperiDONE (RISPERDAL) 3 mg tablet Take 3 mg by mouth nightly.  cpap machine kit by Does Not Apply route. Dx 327.23    TRAZODONE HCL (TRAZODONE PO) Take 50 mg by mouth nightly as needed.  lamoTRIgine (LAMICTAL) 100 mg tablet Take 200 mg by mouth nightly.      No current facility-administered medications for this encounter.      ____________________________________________  ALLERGIES  Allergies   Allergen Reactions    Flagyl [Metronidazole] Other (comments)     HEADACHES AND MENTAL DISTRESS      ____________________________________________  SOCIAL HISTORY  Social History     Tobacco Use    Smoking status: Never Smoker    Smokeless tobacco: Never Used   Substance Use Topics    Alcohol use: No     Alcohol/week: 0.0 standard drinks      ____________________________________________   Internal Administration   First Dose Covid-19, MODERNA, Mrna, Lnp-s, Pf, 100mcg/0.5mL  02/19/2021   Second Dose Covid-19, MODERNA, Mrna, Lnp-s, Pf, 100mcg/0.5mL  03/23/2021      Last COVID Lab SARS-CoV-2 ( )   Date Value   10/21/2021 Not detected          Labs:     Hospital Outpatient Visit on 10/21/2021   Component Date Value Ref Range Status    Specimen source 10/21/2021 Nasopharyngeal    Final    SARS-CoV-2 10/21/2021 Not detected  NOTD   Final    Comment:      The specimen is NEGATIVE for SARS-CoV-2, the novel coronavirus associated with COVID-19. A negative result does not rule out COVID-19. Jonathan SARS-CoV-2 for use on the Jonathan [employer]0/8800 Systems is a real-time RT-PCR test intended for the qualitative detection of nucleic acids from SARS-CoV-2  in clinician-collected nasal, nasopharyngeal,and oropharyngeal swab specimens from individuals who meet COVID-19 clinical and/or epidemiological criteria. Jonathan SARS-CoV-2 is for use only under Emergency Use Authorization (EUA) in laboratories certified under [address] (CLIA), 42 U. [place].C. 196R, that meet requirements to perform high or moderate complexity tests. An individual without symptoms of COVID-19 and who is not shedding SARS-CoV-2 virus would expect to have a negative (not detected) result in this assay.   Fact sheet for Healthcare Providers: ConventionUpdate.co.nz  Fact sheet for Patients: http://www.herring.[place]/                           56998/download       Methodology: RT-PCR     Hospital Outpatient Visit on 10/21/2021   Component Date Value Ref Range Status    Sodium 10/21/2021 139  136 - 145 mmol/L Final    Potassium 10/21/2021 4.2  3.5 - 5.1 mmol/L Final    Chloride 10/21/2021 107  97 - 108 mmol/L Final    CO2 10/21/2021 28  21 - 32 mmol/L Final    Anion gap 10/21/2021 4* 5 - 15 mmol/L Final    Glucose 10/21/2021 106* 65 - 100 mg/dL Final    BUN 10/21/2021 12  6 - 20 MG/DL Final    Creatinine 10/21/2021 0.87  0.55 - 1.02 MG/DL Final    BUN/Creatinine ratio 10/21/2021 14  12 - 20   Final    GFR est AA 10/21/2021 >60 >60 ml/min/1.73m2 Final    GFR est non-AA 10/21/2021 >60  >60 ml/min/1.73m2 Final    Estimated GFR is calculated using the IDMS-traceable Modification of Diet in Renal Disease (MDRD) Study equation, reported for both  Americans (GFRAA) and non- Americans (GFRNA), and normalized to 1.73m2 body surface area. The physician must decide which value applies to the patient.  Calcium 10/21/2021 9.0  8.5 - 10.1 MG/DL Final    WBC 10/21/2021 6.7  3.6 - 11.0 K/uL Final    RBC 10/21/2021 4.34  3.80 - 5.20 M/uL Final    HGB 10/21/2021 13.0  11.5 - 16.0 g/dL Final    HCT 10/21/2021 39.2  35.0 - 47.0 % Final    MCV 10/21/2021 90.3  80.0 - 99.0 FL Final    MCH 10/21/2021 30.0  26.0 - 34.0 PG Final    MCHC 10/21/2021 33.2  30.0 - 36.5 g/dL Final    RDW 10/21/2021 13.8  11.5 - 14.5 % Final    PLATELET 87/47/6093 038  150 - 400 K/uL Final    MPV 10/21/2021 10.5  8.9 - 12.9 FL Final    NRBC 10/21/2021 0.0  0  WBC Final    ABSOLUTE NRBC 10/21/2021 0.00  0.00 - 0.01 K/uL Final    Crossmatch Expiration 10/21/2021 10/28/2021,2359   Final    ABO/Rh(D) 10/21/2021 AB POSITIVE   Final    Antibody screen 10/21/2021 NEG   Final    INR 10/21/2021 0.9  0.9 - 1.1   Final    A single therapeutic range for Vit K antagonists may not be optimal for all indications - see June, 2008 issue of Chest, American College of Chest Physicians Evidence-Based Clinical Practice Guidelines, 8th Edition.     Prothrombin time 10/21/2021 9.8  9.0 - 11.1 sec Final    Color 10/21/2021 YELLOW/STRAW    Final    Color Reference Range: Straw, Yellow or Dark Yellow    Appearance 10/21/2021 CLEAR  CLEAR   Final    Specific gravity 10/21/2021 1.005  1.003 - 1.030   Final    pH (UA) 10/21/2021 5.5  5.0 - 8.0   Final    Protein 10/21/2021 Negative  NEG mg/dL Final    Glucose 10/21/2021 Negative  NEG mg/dL Final    Ketone 10/21/2021 Negative  NEG mg/dL Final    Bilirubin 10/21/2021 Negative  NEG   Final    Blood 10/21/2021 Negative NEG   Final    Urobilinogen 10/21/2021 0.2  0.2 - 1.0 EU/dL Final    Nitrites 10/21/2021 Negative  NEG   Final    Leukocyte Esterase 10/21/2021 TRACE* NEG   Final    UA:UC IF INDICATED 10/21/2021 CULTURE NOT INDICATED BY UA RESULT  CNI   Final    WBC 10/21/2021 0-4  0 - 4 /hpf Final    RBC 10/21/2021 0-5  0 - 5 /hpf Final    Epithelial cells 10/21/2021 FEW  FEW /lpf Final    Epithelial cell category consists of squamous cells and /or transitional urothelial cells. Renal tubular cells, if present, are separately identified as such.  Bacteria 10/21/2021 Negative  NEG /hpf Final    Hyaline cast 10/21/2021 0-2  0 - 5 /lpf Final    Ventricular Rate 10/21/2021 76  BPM Final    Atrial Rate 10/21/2021 76  BPM Final    P-R Interval 10/21/2021 210  ms Final    QRS Duration 10/21/2021 78  ms Final    Q-T Interval 10/21/2021 394  ms Final    QTC Calculation (Bezet) 10/21/2021 443  ms Final    Calculated P Axis 10/21/2021 47  degrees Final    Calculated R Axis 10/21/2021 7  degrees Final    Calculated T Axis 10/21/2021 43  degrees Final    Diagnosis 10/21/2021    Final                    Value:Sinus rhythm with 1st degree AV block  Otherwise normal ECG  When compared with ECG of 10-OCT-2018 10:25,  No significant change was found  Confirmed by Ulises Taylor MD. ([id number]) on 10/21/2021 10:58:00 AM      Hemoglobin A1c 10/21/2021 5.2  4.0 - 5.6 % Final    Comment: NEW METHOD  PLEASE NOTE NEW REFERENCE RANGE  (NOTE)  HbA1C Interpretive Ranges  <5.7              Normal  5.7 - 6.4         Consider Prediabetes  >6.5              Consider Diabetes      Est. average glucose 10/21/2021 103  mg/dL Final    Special Requests: 10/21/2021 NO SPECIAL REQUESTS    Final    Culture result: 10/21/2021 MRSA NOT PRESENT    Final       Skin:     Denies open wounds, cuts, sores, rashes or other areas of concern in PAT assessment.           Yordan Borden NP

## 2021-10-22 ENCOUNTER — ANESTHESIA EVENT (OUTPATIENT)
Dept: SURGERY | Age: 61
End: 2021-10-22
Payer: MEDICARE

## 2021-10-22 LAB
BACTERIA SPEC CULT: NORMAL
BACTERIA SPEC CULT: NORMAL
SERVICE CMNT-IMP: NORMAL

## 2021-10-23 LAB
SARS-COV-2, XPLCVT: NOT DETECTED
SOURCE, COVRS: NORMAL

## 2021-10-25 ENCOUNTER — HOSPITAL ENCOUNTER (OUTPATIENT)
Age: 61
Discharge: NURSING, REHAB OR LONG-TERM CARE | End: 2021-10-28
Attending: ORTHOPAEDIC SURGERY | Admitting: ORTHOPAEDIC SURGERY
Payer: MEDICARE

## 2021-10-25 ENCOUNTER — ANESTHESIA (OUTPATIENT)
Dept: SURGERY | Age: 61
End: 2021-10-25
Payer: MEDICARE

## 2021-10-25 DIAGNOSIS — M17.0 LOCALIZED OSTEOARTHRITIS OF BOTH KNEES: Primary | ICD-10-CM

## 2021-10-25 LAB
GLUCOSE BLD STRIP.AUTO-MCNC: 104 MG/DL (ref 65–117)
SERVICE CMNT-IMP: NORMAL

## 2021-10-25 PROCEDURE — C1776 JOINT DEVICE (IMPLANTABLE): HCPCS | Performed by: ORTHOPAEDIC SURGERY

## 2021-10-25 PROCEDURE — 74011000258 HC RX REV CODE- 258: Performed by: NURSE ANESTHETIST, CERTIFIED REGISTERED

## 2021-10-25 PROCEDURE — 77030041279 HC DRSG PRMSL AG MDII -B: Performed by: ORTHOPAEDIC SURGERY

## 2021-10-25 PROCEDURE — 77030035236 HC SUT PDS STRATFX BARB J&J -B: Performed by: ORTHOPAEDIC SURGERY

## 2021-10-25 PROCEDURE — 74011250636 HC RX REV CODE- 250/636

## 2021-10-25 PROCEDURE — C1713 ANCHOR/SCREW BN/BN,TIS/BN: HCPCS | Performed by: ORTHOPAEDIC SURGERY

## 2021-10-25 PROCEDURE — 74011000250 HC RX REV CODE- 250: Performed by: PHYSICIAN ASSISTANT

## 2021-10-25 PROCEDURE — 74011250637 HC RX REV CODE- 250/637: Performed by: PHYSICIAN ASSISTANT

## 2021-10-25 PROCEDURE — 74011250636 HC RX REV CODE- 250/636: Performed by: ANESTHESIOLOGY

## 2021-10-25 PROCEDURE — 77030008462 HC STPLR SKN PROX J&J -A: Performed by: ORTHOPAEDIC SURGERY

## 2021-10-25 PROCEDURE — C9290 INJ, BUPIVACAINE LIPOSOME: HCPCS | Performed by: ORTHOPAEDIC SURGERY

## 2021-10-25 PROCEDURE — 74011250637 HC RX REV CODE- 250/637: Performed by: ANESTHESIOLOGY

## 2021-10-25 PROCEDURE — 74011250636 HC RX REV CODE- 250/636: Performed by: ORTHOPAEDIC SURGERY

## 2021-10-25 PROCEDURE — 74011000250 HC RX REV CODE- 250: Performed by: NURSE ANESTHETIST, CERTIFIED REGISTERED

## 2021-10-25 PROCEDURE — 97530 THERAPEUTIC ACTIVITIES: CPT

## 2021-10-25 PROCEDURE — 77030028907 HC WRP KNEE WO BGS SOLM -B

## 2021-10-25 PROCEDURE — 77030020274 HC MISC IMPL ORTHOPEDIC: Performed by: ORTHOPAEDIC SURGERY

## 2021-10-25 PROCEDURE — 74011000250 HC RX REV CODE- 250: Performed by: ANESTHESIOLOGY

## 2021-10-25 PROCEDURE — 77030040922 HC BLNKT HYPOTHRM STRY -A

## 2021-10-25 PROCEDURE — 77030000032 HC CUF TRNQT ZIMM -B: Performed by: ORTHOPAEDIC SURGERY

## 2021-10-25 PROCEDURE — 77030010783 HC BOWL MX BN CEM J&J -B: Performed by: ORTHOPAEDIC SURGERY

## 2021-10-25 PROCEDURE — 77030031139 HC SUT VCRL2 J&J -A: Performed by: ORTHOPAEDIC SURGERY

## 2021-10-25 PROCEDURE — 82962 GLUCOSE BLOOD TEST: CPT

## 2021-10-25 PROCEDURE — 77030008463 HC STPLR SKN PROX J&J -B: Performed by: ORTHOPAEDIC SURGERY

## 2021-10-25 PROCEDURE — 74011250636 HC RX REV CODE- 250/636: Performed by: PHYSICIAN ASSISTANT

## 2021-10-25 PROCEDURE — 77030019905 HC CATH URETH INTMIT MDII -A: Performed by: ORTHOPAEDIC SURGERY

## 2021-10-25 PROCEDURE — 2709999900 HC NON-CHARGEABLE SUPPLY: Performed by: ORTHOPAEDIC SURGERY

## 2021-10-25 PROCEDURE — 77030007866 HC KT SPN ANES BBMI -B: Performed by: ANESTHESIOLOGY

## 2021-10-25 PROCEDURE — 77030003601 HC NDL NRV BLK BBMI -A

## 2021-10-25 PROCEDURE — 76210000006 HC OR PH I REC 0.5 TO 1 HR: Performed by: ORTHOPAEDIC SURGERY

## 2021-10-25 PROCEDURE — 74011000250 HC RX REV CODE- 250

## 2021-10-25 PROCEDURE — 76010000171 HC OR TIME 2 TO 2.5 HR INTENSV-TIER 1: Performed by: ORTHOPAEDIC SURGERY

## 2021-10-25 PROCEDURE — 2709999900 HC NON-CHARGEABLE SUPPLY

## 2021-10-25 PROCEDURE — 76060000035 HC ANESTHESIA 2 TO 2.5 HR: Performed by: ORTHOPAEDIC SURGERY

## 2021-10-25 PROCEDURE — 74011000258 HC RX REV CODE- 258: Performed by: ORTHOPAEDIC SURGERY

## 2021-10-25 PROCEDURE — 74011250636 HC RX REV CODE- 250/636: Performed by: NURSE ANESTHETIST, CERTIFIED REGISTERED

## 2021-10-25 PROCEDURE — 74011000250 HC RX REV CODE- 250: Performed by: ORTHOPAEDIC SURGERY

## 2021-10-25 PROCEDURE — 97161 PT EVAL LOW COMPLEX 20 MIN: CPT

## 2021-10-25 PROCEDURE — 77030027138 HC INCENT SPIROMETER -A

## 2021-10-25 PROCEDURE — 77030005513 HC CATH URETH FOL11 MDII -B: Performed by: ORTHOPAEDIC SURGERY

## 2021-10-25 PROCEDURE — 74011250637 HC RX REV CODE- 250/637: Performed by: ORTHOPAEDIC SURGERY

## 2021-10-25 PROCEDURE — 77030006822 HC BLD SAW SAG BRSM -B: Performed by: ORTHOPAEDIC SURGERY

## 2021-10-25 DEVICE — IMPLANTABLE DEVICE
Type: IMPLANTABLE DEVICE | Site: KNEE | Status: FUNCTIONAL
Brand: PERSONA® NATURAL TIBIA®

## 2021-10-25 DEVICE — IMPLANTABLE DEVICE
Type: IMPLANTABLE DEVICE | Site: KNEE | Status: FUNCTIONAL
Brand: PERSONA® VIVACIT-E®

## 2021-10-25 DEVICE — KNEE K1 TOT HEMI STD CEM IMPL CAPPED K1 ZIM: Type: IMPLANTABLE DEVICE | Status: FUNCTIONAL

## 2021-10-25 DEVICE — IMPLANTABLE DEVICE
Type: IMPLANTABLE DEVICE | Site: KNEE | Status: FUNCTIONAL
Brand: PERSONA®

## 2021-10-25 DEVICE — SMARTSET GHV GENTAMICIN HIGH VISCOSITY BONE CEMENT 40G
Type: IMPLANTABLE DEVICE | Site: KNEE | Status: FUNCTIONAL
Brand: SMARTSET

## 2021-10-25 RX ORDER — OXYCODONE HYDROCHLORIDE 5 MG/1
10 TABLET ORAL
Status: DISCONTINUED | OUTPATIENT
Start: 2021-10-25 | End: 2021-10-28 | Stop reason: HOSPADM

## 2021-10-25 RX ORDER — LAMOTRIGINE 100 MG/1
200 TABLET ORAL
Status: DISCONTINUED | OUTPATIENT
Start: 2021-10-25 | End: 2021-10-28 | Stop reason: HOSPADM

## 2021-10-25 RX ORDER — MIDAZOLAM HYDROCHLORIDE 1 MG/ML
1 INJECTION, SOLUTION INTRAMUSCULAR; INTRAVENOUS
Status: DISCONTINUED | OUTPATIENT
Start: 2021-10-25 | End: 2021-10-25 | Stop reason: HOSPADM

## 2021-10-25 RX ORDER — SODIUM CHLORIDE, SODIUM LACTATE, POTASSIUM CHLORIDE, CALCIUM CHLORIDE 600; 310; 30; 20 MG/100ML; MG/100ML; MG/100ML; MG/100ML
125 INJECTION, SOLUTION INTRAVENOUS CONTINUOUS
Status: DISCONTINUED | OUTPATIENT
Start: 2021-10-25 | End: 2021-10-25 | Stop reason: HOSPADM

## 2021-10-25 RX ORDER — MIDAZOLAM HYDROCHLORIDE 1 MG/ML
1 INJECTION, SOLUTION INTRAMUSCULAR; INTRAVENOUS AS NEEDED
Status: DISCONTINUED | OUTPATIENT
Start: 2021-10-25 | End: 2021-10-25 | Stop reason: HOSPADM

## 2021-10-25 RX ORDER — OXYCODONE HYDROCHLORIDE 5 MG/1
5 TABLET ORAL AS NEEDED
Status: DISCONTINUED | OUTPATIENT
Start: 2021-10-25 | End: 2021-10-25 | Stop reason: HOSPADM

## 2021-10-25 RX ORDER — HYDROXYZINE HYDROCHLORIDE 10 MG/1
10 TABLET, FILM COATED ORAL
Status: DISCONTINUED | OUTPATIENT
Start: 2021-10-25 | End: 2021-10-28 | Stop reason: HOSPADM

## 2021-10-25 RX ORDER — VENLAFAXINE HYDROCHLORIDE 150 MG/1
150 CAPSULE, EXTENDED RELEASE ORAL DAILY
Status: DISCONTINUED | OUTPATIENT
Start: 2021-10-26 | End: 2021-10-25 | Stop reason: DRUGHIGH

## 2021-10-25 RX ORDER — KETAMINE HYDROCHLORIDE 10 MG/ML
INJECTION, SOLUTION INTRAMUSCULAR; INTRAVENOUS AS NEEDED
Status: DISCONTINUED | OUTPATIENT
Start: 2021-10-25 | End: 2021-10-25 | Stop reason: HOSPADM

## 2021-10-25 RX ORDER — FENTANYL CITRATE 50 UG/ML
25 INJECTION, SOLUTION INTRAMUSCULAR; INTRAVENOUS
Status: DISCONTINUED | OUTPATIENT
Start: 2021-10-25 | End: 2021-10-25 | Stop reason: HOSPADM

## 2021-10-25 RX ORDER — POLYETHYLENE GLYCOL 3350 17 G/17G
17 POWDER, FOR SOLUTION ORAL DAILY
Status: DISCONTINUED | OUTPATIENT
Start: 2021-10-26 | End: 2021-10-28 | Stop reason: HOSPADM

## 2021-10-25 RX ORDER — SODIUM CHLORIDE 0.9 % (FLUSH) 0.9 %
5-40 SYRINGE (ML) INJECTION EVERY 8 HOURS
Status: DISCONTINUED | OUTPATIENT
Start: 2021-10-25 | End: 2021-10-25 | Stop reason: HOSPADM

## 2021-10-25 RX ORDER — ONDANSETRON 2 MG/ML
4 INJECTION INTRAMUSCULAR; INTRAVENOUS AS NEEDED
Status: DISCONTINUED | OUTPATIENT
Start: 2021-10-25 | End: 2021-10-25 | Stop reason: HOSPADM

## 2021-10-25 RX ORDER — BUPIVACAINE HYDROCHLORIDE 5 MG/ML
INJECTION, SOLUTION EPIDURAL; INTRACAUDAL AS NEEDED
Status: DISCONTINUED | OUTPATIENT
Start: 2021-10-25 | End: 2021-10-25

## 2021-10-25 RX ORDER — AMOXICILLIN 250 MG
1 CAPSULE ORAL 2 TIMES DAILY
Status: DISCONTINUED | OUTPATIENT
Start: 2021-10-25 | End: 2021-10-28 | Stop reason: HOSPADM

## 2021-10-25 RX ORDER — BUPIVACAINE HYDROCHLORIDE 5 MG/ML
INJECTION, SOLUTION EPIDURAL; INTRACAUDAL
Status: COMPLETED | OUTPATIENT
Start: 2021-10-25 | End: 2021-10-25

## 2021-10-25 RX ORDER — OXYCODONE HYDROCHLORIDE 5 MG/1
5 TABLET ORAL
Status: DISCONTINUED | OUTPATIENT
Start: 2021-10-25 | End: 2021-10-28 | Stop reason: HOSPADM

## 2021-10-25 RX ORDER — SODIUM CHLORIDE 9 MG/ML
INJECTION, SOLUTION INTRAVENOUS
Status: DISCONTINUED | OUTPATIENT
Start: 2021-10-25 | End: 2021-10-25 | Stop reason: HOSPADM

## 2021-10-25 RX ORDER — EPHEDRINE SULFATE/0.9% NACL/PF 50 MG/5 ML
SYRINGE (ML) INTRAVENOUS AS NEEDED
Status: DISCONTINUED | OUTPATIENT
Start: 2021-10-25 | End: 2021-10-25 | Stop reason: HOSPADM

## 2021-10-25 RX ORDER — SODIUM CHLORIDE 0.9 % (FLUSH) 0.9 %
5-40 SYRINGE (ML) INJECTION AS NEEDED
Status: DISCONTINUED | OUTPATIENT
Start: 2021-10-25 | End: 2021-10-25 | Stop reason: HOSPADM

## 2021-10-25 RX ORDER — DIPHENHYDRAMINE HYDROCHLORIDE 50 MG/ML
12.5 INJECTION, SOLUTION INTRAMUSCULAR; INTRAVENOUS AS NEEDED
Status: DISCONTINUED | OUTPATIENT
Start: 2021-10-25 | End: 2021-10-25 | Stop reason: HOSPADM

## 2021-10-25 RX ORDER — SODIUM CHLORIDE 0.9 % (FLUSH) 0.9 %
5-40 SYRINGE (ML) INJECTION AS NEEDED
Status: DISCONTINUED | OUTPATIENT
Start: 2021-10-25 | End: 2021-10-28 | Stop reason: HOSPADM

## 2021-10-25 RX ORDER — ACETAMINOPHEN 500 MG
1000 TABLET ORAL EVERY 6 HOURS
Status: DISCONTINUED | OUTPATIENT
Start: 2021-10-25 | End: 2021-10-28 | Stop reason: HOSPADM

## 2021-10-25 RX ORDER — FACIAL-BODY WIPES
10 EACH TOPICAL DAILY PRN
Status: DISCONTINUED | OUTPATIENT
Start: 2021-10-27 | End: 2021-10-28 | Stop reason: HOSPADM

## 2021-10-25 RX ORDER — ACETAMINOPHEN 325 MG/1
650 TABLET ORAL ONCE
Status: COMPLETED | OUTPATIENT
Start: 2021-10-25 | End: 2021-10-25

## 2021-10-25 RX ORDER — DEXTROSE, SODIUM CHLORIDE, SODIUM LACTATE, POTASSIUM CHLORIDE, AND CALCIUM CHLORIDE 5; .6; .31; .03; .02 G/100ML; G/100ML; G/100ML; G/100ML; G/100ML
125 INJECTION, SOLUTION INTRAVENOUS CONTINUOUS
Status: DISCONTINUED | OUTPATIENT
Start: 2021-10-25 | End: 2021-10-25 | Stop reason: HOSPADM

## 2021-10-25 RX ORDER — PROPOFOL 10 MG/ML
INJECTION, EMULSION INTRAVENOUS
Status: DISCONTINUED | OUTPATIENT
Start: 2021-10-25 | End: 2021-10-25 | Stop reason: HOSPADM

## 2021-10-25 RX ORDER — NALOXONE HYDROCHLORIDE 0.4 MG/ML
0.4 INJECTION, SOLUTION INTRAMUSCULAR; INTRAVENOUS; SUBCUTANEOUS AS NEEDED
Status: DISCONTINUED | OUTPATIENT
Start: 2021-10-25 | End: 2021-10-28 | Stop reason: HOSPADM

## 2021-10-25 RX ORDER — LIDOCAINE HYDROCHLORIDE 10 MG/ML
0.5 INJECTION, SOLUTION EPIDURAL; INFILTRATION; INTRACAUDAL; PERINEURAL AS NEEDED
Status: DISCONTINUED | OUTPATIENT
Start: 2021-10-25 | End: 2021-10-25 | Stop reason: HOSPADM

## 2021-10-25 RX ORDER — VENLAFAXINE 37.5 MG/1
75 TABLET ORAL DAILY
Status: DISCONTINUED | OUTPATIENT
Start: 2021-10-26 | End: 2021-10-25

## 2021-10-25 RX ORDER — WARFARIN SODIUM 5 MG/1
5 TABLET ORAL
Status: COMPLETED | OUTPATIENT
Start: 2021-10-25 | End: 2021-10-25

## 2021-10-25 RX ORDER — LEVOTHYROXINE SODIUM 50 UG/1
25 TABLET ORAL
Status: DISCONTINUED | OUTPATIENT
Start: 2021-10-25 | End: 2021-10-28 | Stop reason: HOSPADM

## 2021-10-25 RX ORDER — PROPOFOL 10 MG/ML
INJECTION, EMULSION INTRAVENOUS AS NEEDED
Status: DISCONTINUED | OUTPATIENT
Start: 2021-10-25 | End: 2021-10-25 | Stop reason: HOSPADM

## 2021-10-25 RX ORDER — MICONAZOLE NITRATE 2 %
POWDER (GRAM) TOPICAL
Status: DISCONTINUED | OUTPATIENT
Start: 2021-10-25 | End: 2021-10-28 | Stop reason: HOSPADM

## 2021-10-25 RX ORDER — ASPIRIN 325 MG
325 TABLET, DELAYED RELEASE (ENTERIC COATED) ORAL 2 TIMES DAILY
Status: CANCELLED | OUTPATIENT
Start: 2021-10-25

## 2021-10-25 RX ORDER — SODIUM CHLORIDE 9 MG/ML
125 INJECTION, SOLUTION INTRAVENOUS CONTINUOUS
Status: DISPENSED | OUTPATIENT
Start: 2021-10-25 | End: 2021-10-26

## 2021-10-25 RX ORDER — ONDANSETRON 2 MG/ML
4 INJECTION INTRAMUSCULAR; INTRAVENOUS
Status: ACTIVE | OUTPATIENT
Start: 2021-10-25 | End: 2021-10-27

## 2021-10-25 RX ORDER — ONDANSETRON 2 MG/ML
INJECTION INTRAMUSCULAR; INTRAVENOUS AS NEEDED
Status: DISCONTINUED | OUTPATIENT
Start: 2021-10-25 | End: 2021-10-25 | Stop reason: HOSPADM

## 2021-10-25 RX ORDER — TRAZODONE HYDROCHLORIDE 50 MG/1
50 TABLET ORAL
Status: DISCONTINUED | OUTPATIENT
Start: 2021-10-25 | End: 2021-10-28 | Stop reason: HOSPADM

## 2021-10-25 RX ORDER — ROPIVACAINE HYDROCHLORIDE 5 MG/ML
INJECTION, SOLUTION EPIDURAL; INFILTRATION; PERINEURAL
Status: COMPLETED | OUTPATIENT
Start: 2021-10-25 | End: 2021-10-25

## 2021-10-25 RX ORDER — HYDROMORPHONE HYDROCHLORIDE 1 MG/ML
1 INJECTION, SOLUTION INTRAMUSCULAR; INTRAVENOUS; SUBCUTANEOUS
Status: ACTIVE | OUTPATIENT
Start: 2021-10-25 | End: 2021-10-26

## 2021-10-25 RX ORDER — RISPERIDONE 1 MG/1
3 TABLET, FILM COATED ORAL
Status: DISCONTINUED | OUTPATIENT
Start: 2021-10-25 | End: 2021-10-28 | Stop reason: HOSPADM

## 2021-10-25 RX ORDER — MORPHINE SULFATE 2 MG/ML
2 INJECTION, SOLUTION INTRAMUSCULAR; INTRAVENOUS
Status: DISCONTINUED | OUTPATIENT
Start: 2021-10-25 | End: 2021-10-25 | Stop reason: HOSPADM

## 2021-10-25 RX ORDER — FENTANYL CITRATE 50 UG/ML
50 INJECTION, SOLUTION INTRAMUSCULAR; INTRAVENOUS AS NEEDED
Status: DISCONTINUED | OUTPATIENT
Start: 2021-10-25 | End: 2021-10-25 | Stop reason: HOSPADM

## 2021-10-25 RX ORDER — SODIUM CHLORIDE 0.9 % (FLUSH) 0.9 %
5-40 SYRINGE (ML) INJECTION EVERY 8 HOURS
Status: DISCONTINUED | OUTPATIENT
Start: 2021-10-25 | End: 2021-10-28 | Stop reason: HOSPADM

## 2021-10-25 RX ADMIN — PROPOFOL 50 MCG/KG/MIN: 10 INJECTION, EMULSION INTRAVENOUS at 10:33

## 2021-10-25 RX ADMIN — KETAMINE HYDROCHLORIDE 20 MG: 10 INJECTION, SOLUTION INTRAMUSCULAR; INTRAVENOUS at 10:33

## 2021-10-25 RX ADMIN — Medication 10 MG: at 12:33

## 2021-10-25 RX ADMIN — Medication 10 MG: at 11:14

## 2021-10-25 RX ADMIN — WARFARIN SODIUM 5 MG: 5 TABLET ORAL at 16:24

## 2021-10-25 RX ADMIN — OXYCODONE 5 MG: 5 TABLET ORAL at 20:36

## 2021-10-25 RX ADMIN — MIDAZOLAM 2 MG: 1 INJECTION INTRAMUSCULAR; INTRAVENOUS at 10:17

## 2021-10-25 RX ADMIN — ONDANSETRON HYDROCHLORIDE 4 MG: 2 INJECTION, SOLUTION INTRAMUSCULAR; INTRAVENOUS at 12:03

## 2021-10-25 RX ADMIN — TRANEXAMIC ACID 1 G: 100 INJECTION, SOLUTION INTRAVENOUS at 10:51

## 2021-10-25 RX ADMIN — FENTANYL CITRATE 50 MCG: 50 INJECTION, SOLUTION INTRAMUSCULAR; INTRAVENOUS at 10:17

## 2021-10-25 RX ADMIN — DOCUSATE SODIUM 50 MG AND SENNOSIDES 8.6 MG 1 TABLET: 8.6; 5 TABLET, FILM COATED ORAL at 18:44

## 2021-10-25 RX ADMIN — ACETAMINOPHEN 1000 MG: 500 TABLET ORAL at 18:44

## 2021-10-25 RX ADMIN — ACETAMINOPHEN 650 MG: 325 TABLET ORAL at 10:00

## 2021-10-25 RX ADMIN — OXYCODONE 5 MG: 5 TABLET ORAL at 16:23

## 2021-10-25 RX ADMIN — ROPIVACAINE HYDROCHLORIDE 30 ML: 5 INJECTION, SOLUTION EPIDURAL; INFILTRATION; PERINEURAL at 10:27

## 2021-10-25 RX ADMIN — PROPOFOL 50 MG: 10 INJECTION, EMULSION INTRAVENOUS at 10:33

## 2021-10-25 RX ADMIN — OXYCODONE 5 MG: 5 TABLET ORAL at 19:27

## 2021-10-25 RX ADMIN — TRAZODONE HYDROCHLORIDE 50 MG: 50 TABLET ORAL at 21:26

## 2021-10-25 RX ADMIN — RISPERIDONE 3 MG: 1 TABLET ORAL at 21:26

## 2021-10-25 RX ADMIN — Medication 10 MG: at 10:52

## 2021-10-25 RX ADMIN — BUPIVACAINE HYDROCHLORIDE 10 MG: 5 INJECTION, SOLUTION EPIDURAL; INTRACAUDAL; PERINEURAL at 10:50

## 2021-10-25 RX ADMIN — SODIUM CHLORIDE: 900 INJECTION, SOLUTION INTRAVENOUS at 12:35

## 2021-10-25 RX ADMIN — SODIUM CHLORIDE, POTASSIUM CHLORIDE, SODIUM LACTATE AND CALCIUM CHLORIDE 125 ML/HR: 600; 310; 30; 20 INJECTION, SOLUTION INTRAVENOUS at 10:13

## 2021-10-25 RX ADMIN — SODIUM CHLORIDE 125 ML/HR: 9 INJECTION, SOLUTION INTRAVENOUS at 13:48

## 2021-10-25 RX ADMIN — LAMOTRIGINE 200 MG: 100 TABLET ORAL at 21:26

## 2021-10-25 RX ADMIN — LEVOTHYROXINE SODIUM 25 MCG: 0.05 TABLET ORAL at 21:26

## 2021-10-25 RX ADMIN — PHENYLEPHRINE HYDROCHLORIDE 40 MCG/MIN: 10 INJECTION INTRAVENOUS at 10:41

## 2021-10-25 RX ADMIN — WATER 2 G: 1 INJECTION INTRAMUSCULAR; INTRAVENOUS; SUBCUTANEOUS at 10:51

## 2021-10-25 RX ADMIN — CEFAZOLIN SODIUM 2 G: 1 INJECTION, POWDER, FOR SOLUTION INTRAMUSCULAR; INTRAVENOUS at 18:44

## 2021-10-25 NOTE — PROGRESS NOTES
Problem: Mobility Impaired (Adult and Pediatric)  Goal: *Acute Goals and Plan of Care (Insert Text)  Description: FUNCTIONAL STATUS PRIOR TO ADMISSION: Patient was independent and active without use of DME.    HOME SUPPORT PRIOR TO ADMISSION: The patient lived alone with sister present in the room and nearby however patient reports to have no one provide assistance. Pt has RW from family however may require jr walker as she is shorter than the RW    Physical Therapy Goals  Initiated 10/25/2021    1. Patient will move from supine to sit and sit to supine , scoot up and down, and roll side to side in bed with modified independence within 4 days. 2. Patient will perform sit to stand with modified independence within 4 days. 3. Patient will ambulate with modified independence for 300 feet with the least restrictive device within 4 days. 4. Patient will ascend/descend 6 stairs with 1 handrail(s) with supervision/set-up within 4 days. 5. Patient will perform home exercise program per protocol with modified independence within 4 days. 6. Patient will demonstrate AROM 0-90 degrees in operative joint within 4 days. Outcome: Progressing Towards Goal     PHYSICAL THERAPY EVALUATION  Patient: Freddy Huff (40 y.o. female)  Date: 10/25/2021  Primary Diagnosis: Localized osteoarthritis of both knees [M17.0]  Procedure(s) (LRB):  LEFT TOTAL KNEE ARTHROPLASTY (Left) Day of Surgery   Precautions: Fall, WBAT      ASSESSMENT  Based on the objective data described below, the patient presents with impaired trunk ROM, spinal precautions, L LE pain, balance, weakness and gait dysfunction/intolerance causing limited independence with mobility s/p recent LTKA POD #0. Pt PLOF: independent with ADLs and IADLs. Patient lives alone in one story apartment with elevator to get inside, 6 steps to enter, one rails. Patient overall supervision to CGA for bed mobility and able to use RLE to lift LLE for  supine to sit.  Pt states that she feels dizzy however noted to have BP stable and no orthostatic hypotension noted. Pt transfers to/from standing with RW and to [facility]. Pt able to toilet independently. Pt participated in  gait training 2 feet with RW and shuffling, small stepped CGA gait pattern. Patient returned to bed at end of session. Patient's mobility was on target for POD#0. Will address more exercises, increase gait distance, assess stairs and assess for discharge at PT sessions tomorrow. Pt states that she is fearful of DC home and requesting rehab. Patient instructed NOT to get up from bed, chair or commode without calling for assistance. Initiated post TKA exercise protocol and wrote same on [employer]. Anticipate discharge after 1-2 more PT sessions tomorrow. Recommend HHPT and ordering of RW prior to discharge    Current Level of Function Impacting Discharge (mobility/balance): contact guard for mobility, no gait at this time    Functional Outcome Measure: The patient scored Total Score: 6/28 on the Tinetti outcome measure which is indicative of high fall risk. Other factors to consider for discharge:      Patient will benefit from skilled therapy intervention to address the above noted impairments. PLAN :  Recommendations and Planned Interventions: bed mobility training, transfer training, gait training, therapeutic exercises, neuromuscular re-education, patient and family training/education, and therapeutic activities      Frequency/Duration: Patient will be followed by physical therapy:  twice daily to address goals.     Recommendation for discharge: (in order for the patient to meet his/her long term goals)  Therapy 3 hours per day 5-7 days per week vs to be assessed pending progress    This discharge recommendation:  Has been made in collaboration with the attending provider and/or case management    IF patient discharges home will need the following DME: may require jr rolling walker SUBJECTIVE:   Patient stated I just dont feel sure about this.     OBJECTIVE DATA SUMMARY:   HISTORY:    Past Medical History:   Diagnosis Date    Anxiety and depression     [facility]    Bipolar Disorder     [facility]    Chronic pain     LEFT KNEE    DDD (degenerative disc disease), lumbosacral     L5-S1 ([facility])    Depression     Fatty liver 2018    Foot fracture, right     h/o    Genital herpes     GERD (gastroesophageal reflux disease)     History of gastritis     Dr. Avi Lucia    Hyperlipidemia     Hypothyroidism (acquired)     Menopause     LMP- 51years old? OA (osteoarthritis)     knees, Dr. Russell Weber    Obesity     NIKOLE on CPAP 2007    adherent with CPAP use    Symptomatic cholelithiasis 10/18/2017     Past Surgical History:   Procedure Laterality Date    HX COLONOSCOPY  9/20/12    colon polyp, repeat in 5 yrs, Dr. Hany Lezama    HX ENDOSCOPY  5/19/16    gastritis, hiatal hernia (Dr. Avi Lucia)    HX LAP CHOLECYSTECTOMY  10/18/2018    Laparoscopic cholecystectomy by Dr. Nithya Sandoval.     HX OTHER SURGICAL      cyst removed from scalp - benign       Personal factors and/or comorbidities impacting plan of care:     Home Situation  Home Environment: Apartment  # Steps to Enter: 6 (Six steps to get into the building, takes elevator to 2nd fl)  Rails to Enter: Yes  Hand Rails : Right  Wheelchair Ramp: No  One/Two Story Residence: One story  Living Alone: Yes  Patient Expects to be Discharged to[de-identified] Rehabilitation facility  Current DME Used/Available at Home: Rory Opal, rolling, Walker, rollator, CPAP  Tub or Shower Type: Tub    EXAMINATION/PRESENTATION/DECISION MAKING:   Critical Behavior:  Neurologic State: Alert  Orientation Level: Oriented X4  Cognition: Appropriate decision making, Appropriate for age attention/concentration, Appropriate safety awareness     Hearing:     Skin:  intact  Edema: none  Range Of Motion:  AROM: Generally decreased, functional           PROM: Generally decreased, functional           Strength:    Strength: Generally decreased, functional                    Tone & Sensation:                                  Coordination:  Coordination: Within functional limits  Vision:      Functional Mobility:  Bed Mobility:  Rolling: Supervision;Contact guard assistance  Supine to Sit: Supervision;Contact guard assistance  Sit to Supine: Supervision;Contact guard assistance  Scooting: Supervision;Contact guard assistance  Transfers:  Sit to Stand: Stand-by assistance;Contact guard assistance  Stand to Sit: Stand-by assistance;Contact guard assistance  Stand Pivot Transfers: Stand-by assistance;Contact guard assistance                    Balance:   Sitting: Intact; Without support  Standing: Impaired; With support  Standing - Static: Fair;Constant support  Standing - Dynamic : Fair;Constant support  Ambulation/Gait Training:                                       Stairs: Therapeutic Exercises:   AP and LAQ, quad sets    Functional Measure:  Tinetti test:    Sitting Balance: 1  Arises: 1  Attempts to Rise: 1  Immediate Standing Balance: 1  Standing Balance: 1  Nudged: 0  Eyes Closed: 0  Turn 360 Degrees - Continuous/Discontinuous: 0  Turn 360 Degrees - Steady/Unsteady: 0  Sitting Down: 1  Balance Score: 6 Balance total score  Indication of Gait: 0  R Step Length/Height: 0  L Step Length/Height: 0  R Foot Clearance: 0  L Foot Clearance: 0  Step Symmetry: 0  Step Continuity: 0  Path: 0  Trunk: 0  Walking Time: 0  Gait Score: 0 Gait total score  Total Score: 6/28 Overall total score         Tinetti Tool Score Risk of Falls  <19 = High Fall Risk  19-24 = Moderate Fall Risk  25-28 = Low Fall Risk  Tinetti ME. Performance-Oriented Assessment of Mobility Problems in Elderly Patients. Pope 66; [id number].  (Scoring Description: PT Bulletin Feb. 10, 1993)    Older adults: Fortunato Duff et al, 2009; n = [address] Ente[address] elderly evaluated with ABC, JAMES, ADL, and IADL)  · Mean JAMES score for males aged 69-68 years = 26.21(3.40)  · Mean JAMES score for females age 69-68 years = 25.16(4.30)  · Mean JAMES score for males over [de-identified] years = 23.29(6.02)  · Mean JAMES score for females over [de-identified] years = 17.20(8.32)        Physical Therapy Evaluation Charge Determination   History Examination Presentation Decision-Making   HIGH Complexity :3+ comorbidities / personal factors will impact the outcome/ POC  HIGH Complexity : 4+ Standardized tests and measures addressing body structure, function, activity limitation and / or participation in recreation  LOW Complexity : Stable, uncomplicated  Other outcome measures tinetti  HIGH       Based on the above components, the patient evaluation is determined to be of the following complexity level: LOW     Pain Rating:  Pain in knee, 8/10 exhaustion    Activity Tolerance:   Good, Fair, and requires rest breaks      After treatment patient left in no apparent distress:   Supine in bed, Call bell within reach, and Caregiver / family present    COMMUNICATION/EDUCATION:   The patients plan of care was discussed with: Registered nurse and Case management. Fall prevention education was provided and the patient/caregiver indicated understanding. and Patient/family have participated as able in goal setting and plan of care.     Thank you for this referral.  Genet Boland, PT   Time Calculation: 35 mins

## 2021-10-25 NOTE — ANESTHESIA PREPROCEDURE EVALUATION
Anesthetic History   No history of anesthetic complications            Review of Systems / Medical History  Patient summary reviewed, nursing notes reviewed and pertinent labs reviewed    Pulmonary        Sleep apnea: CPAP           Neuro/Psych         Psychiatric history     Cardiovascular                  Exercise tolerance: >4 METS     GI/Hepatic/Renal     GERD: well controlled           Endo/Other      Hypothyroidism: well controlled  Obesity and arthritis     Other Findings              Physical Exam    Airway  Mallampati: II  TM Distance: > 6 cm  Neck ROM: normal range of motion   Mouth opening: Normal     Cardiovascular  Regular rate and rhythm,  S1 and S2 normal,  no murmur, click, rub, or gallop             Dental  No notable dental hx       Pulmonary  Breath sounds clear to auscultation               Abdominal  GI exam deferred       Other Findings            Anesthetic Plan    ASA: 3  Anesthesia type: spinal      Post-op pain plan if not by surgeon: peripheral nerve block single    Induction: Intravenous  Anesthetic plan and risks discussed with: Patient

## 2021-10-25 NOTE — PROGRESS NOTES
Bedside and Verbal shift change report given to Leonardo Hunter (oncoming nurse) by Anthony Winslow (offgoing nurse). Report included the following information SBAR, Kardex, Intake/Output and MAR.

## 2021-10-25 NOTE — ANESTHESIA PROCEDURE NOTES
Peripheral Block    Start time: 10/25/2021 10:20 AM  End time: 10/25/2021 10:27 AM  Performed by: Mya Egan MD  Authorized by: Mya Egan MD       Pre-procedure: Indications: at surgeon's request and post-op pain management    Preanesthetic Checklist: patient identified, risks and benefits discussed, site marked, timeout performed and patient being monitored    Timeout Time: 10:27 EDT          Block Type:   Block Type:   Adductor canal  Laterality:  Left  Monitoring:  Standard ASA monitoring, continuous pulse ox, frequent vital sign checks, heart rate, responsive to questions and oxygen  Injection Technique:  Single shot  Procedures: ultrasound guided    Patient Position: supine  Prep: DuraPrep    Needle Type:  Stimuplex  Needle Gauge:  22 G  Needle Localization:  Ultrasound guidance  Medication Injected:  Ropivacaine (PF) (NAROPIN)(0.5%) 5 mg/mL injection, 30 mL  Med Admin Time: 10/25/2021 10:27 AM    Assessment:  Number of attempts:  1  Injection Assessment:  Incremental injection every 5 mL, local visualized surrounding nerve on ultrasound, negative aspiration for blood, no paresthesia and no intravascular symptoms  Patient tolerance:  Patient tolerated the procedure well with no immediate complications

## 2021-10-25 NOTE — PROGRESS NOTES
Pharmacist Note - Warfarin Dosing  Consult provided for this 57 y.o. female to manage warfarin for LEFT TOTAL KNEE ARTHROPLASTY     INR Goal: 1.8-2.2    Therapy Day: 1    Preop Dose: [place]- none    Drugs that may increase INR: None  Drugs that may decrease INR: None  Other current anticoagulants/ drugs that may increase bleeding risk: None  Risk factors: None  Daily INR ordered: YES    No results for input(s): HGB, INR, HGBEXT, INREXT in the last 72 hours. Date               INR                 Dose  10/21  0.9  Pre-op  10/25  --  5 mg    Assessment/ Plan: Will order warfarin 5 mg PO x 1 dose. Pharmacy will continue to monitor daily and adjust therapy as indicated.

## 2021-10-25 NOTE — ANESTHESIA PROCEDURE NOTES
Spinal Block    Start time: 10/25/2021 10:45 AM  End time: 10/25/2021 10:50 AM  Performed by: Klever Santos MD  Authorized by: Klever Santos MD     Pre-procedure:   Indications: primary anesthetic  Preanesthetic Checklist: risks and benefits discussed and timeout performed    Timeout Time: 10:45 EDT          Spinal Block:   Patient Position:  Seated    Prep: Betadine      Location:  L3-4  Technique:  Single shot        Needle:   Needle Type:  Pencil-tip  Needle Gauge:  25 G  Attempts:  1      Events: CSF confirmed, no blood with aspiration and no paresthesia        Assessment:  Insertion:  Uncomplicated  Patient tolerance:  Patient tolerated the procedure well with no immediate complications

## 2021-10-25 NOTE — PERIOP NOTES
TRANSFER - OUT REPORT:    Verbal report given to [address] on Livan Bernardo  being transferred to room 558 for routine post - op       Report consisted of patients Situation, Background, Assessment and   Recommendations(SBAR). Time Pre op antibiotic given:  5470  Anesthesia Stop time:  7839  Valverde Present on Transfer to floor: NO  Order for Valverde on Chart: NO,    Information from the following report(s) SBAR and MAR was reviewed with the receiving nurse. Opportunity for questions and clarification was provided. Is the patient on 02? NO       L/Min r/a       Other     Is the patient on a monitor? NO    Is the nurse transporting with the patient? NO    Surgical Waiting Area notified of patient's transfer from PACU?  YES    Lines:   Peripheral IV 10/25/21 Left;Posterior Hand (Active)   Site Assessment Clean, dry, & intact 10/25/21 1345   Phlebitis Assessment 0 10/25/21 1345   Infiltration Assessment 0 10/25/21 1345   Dressing Status Clean, dry, & intact 10/25/21 1345   Dressing Type Transparent;Tape 10/25/21 1345   Hub Color/Line Status Infusing 10/25/21 1345   Action Taken Open ports on tubing capped 10/25/21 1345   Alcohol Cap Used Yes 10/25/21 1345        The following personal items collected during your admission accompanied patient upon transfer:   Dental Appliance: Dental Appliances: None  Vision:    Hearing Aid:    Jewelry:    Clothing: Clothing: With patient  Other Valuables:    Valuables sent to safe:

## 2021-10-25 NOTE — ANESTHESIA POSTPROCEDURE EVALUATION
Procedure(s):  LEFT TOTAL KNEE ARTHROPLASTY. spinal, MAC    Anesthesia Post Evaluation        Patient location during evaluation: PACU  Patient participation: complete - patient participated  Level of consciousness: awake and alert  Pain management: adequate  Airway patency: patent  Anesthetic complications: no  Cardiovascular status: acceptable  Respiratory status: acceptable  Hydration status: acceptable  Comments: I have seen and evaluated the patient and is ready for discharge. Jenise Mathur MD    Post anesthesia nausea and vomiting:  none      INITIAL Post-op Vital signs:   Vitals Value Taken Time   /60 10/25/21 1315   Temp 36.5 °C (97.7 °F) 10/25/21 1244   Pulse 83 10/25/21 1329   Resp 20 10/25/21 1329   SpO2 99 % 10/25/21 1329   Vitals shown include unvalidated device data.

## 2021-10-25 NOTE — H&P
Date of Surgery Update:  Rakel Palacios was seen and examined. History and physical has been reviewed. The patient has been examined.  There have been no significant clinical changes since the completion of the originally dated History and Physical.    Signed By: Tara Carlos MD     October 25, 2021 7:33 AM

## 2021-10-25 NOTE — OP NOTES
Name: Yin Valentino  MRN:  [id number]  : 1960  Age:  57 y.o. Surgery Date: 10/25/2021      OPERATIVE REPORT - LEFT TOTAL KNEE REPLACEMENT    PREOPERATIVE DIAGNOSIS: Osteoarthritis, left knee. POSTOPERATIVE DIAGNOSIS: Osteoarthritis, left knee. PROCEDURE PERFORMED: Left total knee arthroplasty. SURGEON: Adin Paige MD    FIRST ASSISTANT: Jefe Mojica PA-C    ANESTHESIA: Spinal    PRE-OP ANTIBIOTIC: Ancef 2g    COMPLICATIONS: None. ESTIMATED BLOOD LOSS: 50 mL. SPECIMENS REMOVED: None. COMPONENTS IMPLANTED:   Implant Name Type Inv. Item Serial No.  Lot No. LRB No. Used Action   CEMENT BNE 40GM FULL DOSE PMMA W/ GENT HI VISC RADPQ LNG - SNA  CEMENT BNE 40GM FULL DOSE PMMA W/ GENT HI VISC RADPQ LNG NA JN [employer]_ [id number] Left 2 Implanted   COMPONENT FEM SZ 8 L KNEE CO CHROM SAM CRUCE RET MULUGETA - SNA  COMPONENT FEM SZ 8 L KNEE CO CHROM SAM CRUCE RET MULUGETA NA [employer]ALIN[employer]OTPOTAT[employer] MEDIA  Left 1 Implanted   TIB PRN NP STM 5 DEG SZ DL -- PERSONA - SNA  TIB PRN NP STM 5 DEG SZ DL -- PERSONA NA ROSALINO [id number] Left 1 Implanted   STEM KNE EXT TPR MULUGETA [id number] -- PERSONA - SNA  STEM KNE EXT TPR MULUGETA [id number] -- PERSONA NA ROSALINO [id number] Left 1 Implanted   PSN MC VE ASF L 13MM 8-9/CD - SNA  PSN MC VE ASF L 13MM 8-9/CD NA ROSALINO [employer]_ [id number] Left 1 Implanted   COMPONENT PAT [id number] THK8MM STD VIVACIT-E MULUGETA INSET FOR - SNA  COMPONENT PAT [id number] THK8MM STD VIVACIT-E MULUGETA INSET FOR NA ROSALINO [id number] Left 1 Implanted       INDICATIONS: The patient is an 57 yrs female with progressive debilitating left knee pain due to severe osteoarthritis. Symptoms have progressed despite comprehensive conservative treatment and the patient presents for left total knee replacement. Risks, benefits, alternatives of the procedure were reviewed with the patient in detail and the patient desires to proceed.  The patient understands the risk for perioperative medical complications. DESCRIPTION OF PROCEDURE: Spinal anesthesia was initiated. Preoperative dose of antibiotic was given. Valverde catheter was not placed. The left lower extremity was prepped and draped in the usual sterile fashion. The skin was covered with Ioban occlusive dressing. The left lower extremity was exsanguinated. A medial parapatellar incision was made to the left knee. The left knee was exposed and the distal femur was cut at 5 degrees distal femoral valgus. Femur was sized for a size 8. An AP cutting block was placed, rotated based on bony landmarks. Femoral cuts were completed for a size 8. The tibia was subluxed and a neutral varus/valgus proximal tibial cut was made matching the patient's slope. The meniscal remnants were removed. The posterior osteophytes were removed from the femur. Gaps were examined. The flexion and extension gaps were 13 mm. The femur was finished for a 8N, tibia for a D. Trials were placed. Patella was cut and a 29 mm trial was fitted . The knee tracked well with all trial implants. The trials were then removed. Bony surfaces were drilled, lavaged, and dried. All components were cemented. Excess cement was removed. A 13 mm polyethylene component was placed. After the cement had fully cured, the knee was ranged and  irrigated copiously with pulsatile lavage. All surrounding soft tissues were infiltrated with local anesthetic. The arthrotomy was closed with #2 Vicryl sutures and 0 Vicryl sutures. Skin and subcutaneous tissues were irrigated and closed in standard fashion. Sterile dressing was applied. There were no complications. The procedure was a LEFT TOTAL KNEE REPLACEMENT. A Marsha total knee construct was utilized. No specimens were obtained/sent. The patient was transferred to the recovery room in stable condition. Varus release.     Nikos Celestin PA-C was critical throughout the case to assist with positioning, retraction and closure. There were no other available residents or surgical assistants to assist during this procedure.       Edy Beltran MD

## 2021-10-25 NOTE — PROGRESS NOTES
Primary Nurse Chloe Greenwood and Raj Cagle., RN performed a dual skin assessment on this patient No impairment noted  Dylon score is 20

## 2021-10-25 NOTE — PROGRESS NOTES
TRANSFER - IN REPORT:    Verbal report received from [facility] (name) on Hina Cano  being received from PACU (unit) for routine progression of care      Report consisted of patients Situation, Background, Assessment and   Recommendations(SBAR). Information from the following report(s) SBAR, Kardex, Intake/Output and MAR was reviewed with the receiving nurse. Opportunity for questions and clarification was provided. Assessment completed upon patients arrival to unit and care assumed.

## 2021-10-26 LAB
ANION GAP SERPL CALC-SCNC: 7 MMOL/L (ref 5–15)
BUN SERPL-MCNC: 10 MG/DL (ref 6–20)
BUN/CREAT SERPL: 14 (ref 12–20)
CALCIUM SERPL-MCNC: 8.2 MG/DL (ref 8.5–10.1)
CHLORIDE SERPL-SCNC: 100 MMOL/L (ref 97–108)
CO2 SERPL-SCNC: 25 MMOL/L (ref 21–32)
CREAT SERPL-MCNC: 0.73 MG/DL (ref 0.55–1.02)
GLUCOSE SERPL-MCNC: 151 MG/DL (ref 65–100)
HGB BLD-MCNC: 11.5 G/DL (ref 11.5–16)
INR PPP: 1 (ref 0.9–1.1)
POTASSIUM SERPL-SCNC: 3.6 MMOL/L (ref 3.5–5.1)
PROTHROMBIN TIME: 10.9 SEC (ref 9–11.1)
SODIUM SERPL-SCNC: 132 MMOL/L (ref 136–145)

## 2021-10-26 PROCEDURE — 74011250636 HC RX REV CODE- 250/636: Performed by: PHYSICIAN ASSISTANT

## 2021-10-26 PROCEDURE — 97535 SELF CARE MNGMENT TRAINING: CPT

## 2021-10-26 PROCEDURE — 97116 GAIT TRAINING THERAPY: CPT

## 2021-10-26 PROCEDURE — 74011000250 HC RX REV CODE- 250: Performed by: PHYSICIAN ASSISTANT

## 2021-10-26 PROCEDURE — 74011250637 HC RX REV CODE- 250/637: Performed by: ORTHOPAEDIC SURGERY

## 2021-10-26 PROCEDURE — 85610 PROTHROMBIN TIME: CPT

## 2021-10-26 PROCEDURE — 74011250637 HC RX REV CODE- 250/637: Performed by: PHYSICIAN ASSISTANT

## 2021-10-26 PROCEDURE — 97165 OT EVAL LOW COMPLEX 30 MIN: CPT

## 2021-10-26 PROCEDURE — 36415 COLL VENOUS BLD VENIPUNCTURE: CPT

## 2021-10-26 PROCEDURE — 85018 HEMOGLOBIN: CPT

## 2021-10-26 PROCEDURE — 80048 BASIC METABOLIC PNL TOTAL CA: CPT

## 2021-10-26 RX ORDER — WARFARIN SODIUM 5 MG/1
5 TABLET ORAL
Status: COMPLETED | OUTPATIENT
Start: 2021-10-26 | End: 2021-10-26

## 2021-10-26 RX ADMIN — Medication 10 ML: at 11:35

## 2021-10-26 RX ADMIN — CEFAZOLIN SODIUM 2 G: 1 INJECTION, POWDER, FOR SOLUTION INTRAMUSCULAR; INTRAVENOUS at 02:50

## 2021-10-26 RX ADMIN — ACETAMINOPHEN 1000 MG: 500 TABLET ORAL at 18:15

## 2021-10-26 RX ADMIN — Medication 10 ML: at 21:01

## 2021-10-26 RX ADMIN — VENLAFAXINE HYDROCHLORIDE 225 MG: 150 CAPSULE, EXTENDED RELEASE ORAL at 08:26

## 2021-10-26 RX ADMIN — OXYCODONE 5 MG: 5 TABLET ORAL at 18:15

## 2021-10-26 RX ADMIN — ACETAMINOPHEN 1000 MG: 500 TABLET ORAL at 11:32

## 2021-10-26 RX ADMIN — RISPERIDONE 3 MG: 1 TABLET ORAL at 21:00

## 2021-10-26 RX ADMIN — ACETAMINOPHEN 1000 MG: 500 TABLET ORAL at 06:24

## 2021-10-26 RX ADMIN — OXYCODONE 10 MG: 5 TABLET ORAL at 11:34

## 2021-10-26 RX ADMIN — WARFARIN SODIUM 5 MG: 5 TABLET ORAL at 11:32

## 2021-10-26 RX ADMIN — LEVOTHYROXINE SODIUM 25 MCG: 0.05 TABLET ORAL at 21:00

## 2021-10-26 RX ADMIN — OXYCODONE 10 MG: 5 TABLET ORAL at 02:50

## 2021-10-26 RX ADMIN — Medication 10 ML: at 18:17

## 2021-10-26 RX ADMIN — DOCUSATE SODIUM 50 MG AND SENNOSIDES 8.6 MG 1 TABLET: 8.6; 5 TABLET, FILM COATED ORAL at 18:15

## 2021-10-26 RX ADMIN — LAMOTRIGINE 200 MG: 100 TABLET ORAL at 21:01

## 2021-10-26 RX ADMIN — POLYETHYLENE GLYCOL 3350 17 G: 17 POWDER, FOR SOLUTION ORAL at 08:25

## 2021-10-26 RX ADMIN — DOCUSATE SODIUM 50 MG AND SENNOSIDES 8.6 MG 1 TABLET: 8.6; 5 TABLET, FILM COATED ORAL at 08:25

## 2021-10-26 RX ADMIN — TRAZODONE HYDROCHLORIDE 50 MG: 50 TABLET ORAL at 21:00

## 2021-10-26 NOTE — PROGRESS NOTES
ORTHO PROGRESS NOTE    2021  Admit Date:   10/25/2021        Subjective:    Arron Patiño is a 57 y.o. WHITE/NON- female who is 1 Day Post-Op Procedure(s):  LEFT TOTAL KNEE ARTHROPLASTY. The patient states severe pain. The patient denies CP, SOB, N/V. Vital Signs:    Patient Vitals for the past 8 hrs:   BP Temp Pulse Resp SpO2   10/26/21 0307 (!) 143/69 99.3 °F (37.4 °C) 90 18 96 %     Temp (24hrs), Av.2 °F (36.8 °C), Min:97.5 °F (36.4 °C), Max:99.3 °F (37.4 °C)      Pain Control:   Pain Assessment  Pain Scale 1: Numeric (0 - 10)  Pain Intensity 1: 8  Pain Location 1: Knee  Pain Orientation 1: Left  Pain Description 1: Burning, Aching  Pain Intervention(s) 1: Medication (see MAR)    LAB:    Recent Labs     10/26/21  0448   HGB 11.5   INR 1.0   *   K 3.6      CO2 25   BUN 10   CREA 0.73   *       Assessment & Physician's Comment:  A&O X3, NAD  Respirations unlabored  Abdomen S/NT  DF/EHL/PF intact  Distal pulses intact  Calves S/NT, SILT bilateral lower extremities  Dressing is clean, dry, and intact    Procedure:  Procedure(s):  LEFT TOTAL KNEE ARTHROPLASTY    Plan:  1) Pain Control: Adequate, continue current regimen. 2) Hemodynamics: Stable. Will continue to monitor. 3) Wound: Change dressing if saturated. 4) Activity: WBAT, mobilize with assist.  5) DVT Prophylaxis: Coumadin pharmacy dosing, SCD's, encourage ankle pump exercise, mobilize. 6) Disposition: Case management for discharge planning. The patient lives alone and would like SNF/Rehab placement. Will continue to follow progress closely.         Neo Ross PA-C

## 2021-10-26 NOTE — PROGRESS NOTES
Problem: Mobility Impaired (Adult and Pediatric)  Goal: *Acute Goals and Plan of Care (Insert Text)  Description: FUNCTIONAL STATUS PRIOR TO ADMISSION: Patient was independent and active without use of DME.    HOME SUPPORT PRIOR TO ADMISSION: The patient lived alone with sister present in the room and nearby however patient reports to have no one provide assistance. Pt has RW from family however may require jr walker as she is shorter than the RW    Physical Therapy Goals  Initiated 10/25/2021    1. Patient will move from supine to sit and sit to supine , scoot up and down, and roll side to side in bed with modified independence within 4 days. 2. Patient will perform sit to stand with modified independence within 4 days. 3. Patient will ambulate with modified independence for 300 feet with the least restrictive device within 4 days. 4. Patient will ascend/descend 6 stairs with 1 handrail(s) with supervision/set-up within 4 days. 5. Patient will perform home exercise program per protocol with modified independence within 4 days. 6. Patient will demonstrate AROM 0-90 degrees in operative joint within 4 days. 10/26/2021 1548 by Jennette Meigs, PT  Outcome: Progressing Towards Goal  PHYSICAL THERAPY TREATMENT  Patient: Steph Franklin (74 y.o. female)  Date: 10/26/2021  Diagnosis: Localized osteoarthritis of both knees [M17.0] <principal problem not specified>  Procedure(s) (LRB):  LEFT TOTAL KNEE ARTHROPLASTY (Left) 1 Day Post-Op  Precautions: Fall, WBAT  Chart, physical therapy assessment, plan of care and goals were reviewed. ASSESSMENT  Patient continues with skilled PT services and is progressing towards goals. Patient progressed walking distance x x 140 feet with RW. Wide DANIELLE and decreased step length observed. Patient is methodical and motivated, but continues to be fearful about going home alone. She voices that most of her fears are ADL related, bathing, dressing, cooking etc...  Patient educated and encouraged regarding her quick progress with acute care PT and high functional level. Reviewed LE exercises, and left Patient up in the bedside chair. Of note, if home, Patient will need marcial RW ordered. Current Level of Function Impacting Discharge (mobility/balance): decreased activity tolerance    Other factors to consider for discharge: ramp to enter home/no stairs, minimal community support         PLAN :  Patient continues to benefit from skilled intervention to address the above impairments. Continue treatment per established plan of care. to address goals. Recommendation for discharge: (in order for the patient to meet his/her long term goals)  Physical therapy at least 2 days/week in the home . However, Patient hopeful for SNF rehab as she lives alone. Case management is aware. This discharge recommendation:  Has been made in collaboration with the attending provider and/or case management    IF patient discharges home will need the following DME: marcial RW       SUBJECTIVE:   Patient stated It's not so much the walking, but the bathing, dressing, and getting groceries.  (OT plans to follow up tomorrow)    OBJECTIVE DATA SUMMARY:   Critical Behavior:  Neurologic State: Alert  Orientation Level: Oriented X4  Cognition: Appropriate decision making, Appropriate for age attention/concentration, Appropriate safety awareness, Follows commands  Safety/Judgement: Good awareness of safety precautions    Range of Motion:  AROM: Within functional limits  PROM: Within functional limits      Functional Mobility Training:  Bed Mobility:  Supine to Sit: Supervision  Sit to Supine:  (remained in the chair)  Scooting: Supervision        Transfers:  Sit to Stand: Stand-by assistance  Stand to Sit: Stand-by assistance  Bed to Chair: Contact guard assistance     Balance:  Sitting: Intact  Standing: Intact; With support  Standing - Static: Constant support; Fair  Standing - Dynamic : Constant support; Fair    Ambulation/Gait Training:  Distance (ft): 140 Feet (ft)  Assistive Device: Gait belt;Walker, rolling (marcial walker)  Ambulation - Level of Assistance: Stand-by assistance; Adaptive equipment; Additional time  Gait Abnormalities: Antalgic;Decreased step clearance  Base of Support: Widened  Stance: Left decreased  Speed/Idalmis: Slow  Step Length: Right shortened;Left shortened    Stairs:  Stairs - Level of Assistance:  (no stairs, has ramp)        Therapeutic Exercises:   Patient performed LE exercise before PT came in    Pain Ratin/10, L knee pain    Activity Tolerance:   Good, SpO2 stable on RA, and observed SOB with activity    After treatment patient left in no apparent distress:   Sitting in chair and Call bell within reach    COMMUNICATION/COLLABORATION:   The patients plan of care was discussed with: Occupational therapist and Registered nurse.      Noralee Romberg, PT, DPT  Geriatric Clinical Specialist     Time Calculation: 13 mins

## 2021-10-26 NOTE — PROGRESS NOTES
Problem: Mobility Impaired (Adult and Pediatric)  Goal: *Acute Goals and Plan of Care (Insert Text)  Description: FUNCTIONAL STATUS PRIOR TO ADMISSION: Patient was independent and active without use of DME.    HOME SUPPORT PRIOR TO ADMISSION: The patient lived alone with sister present in the room and nearby however patient reports to have no one provide assistance. Pt has RW from family however may require jr walker as she is shorter than the RW    Physical Therapy Goals  Initiated 10/25/2021    1. Patient will move from supine to sit and sit to supine , scoot up and down, and roll side to side in bed with modified independence within 4 days. 2. Patient will perform sit to stand with modified independence within 4 days. 3. Patient will ambulate with modified independence for 300 feet with the least restrictive device within 4 days. 4. Patient will ascend/descend 6 stairs with 1 handrail(s) with supervision/set-up within 4 days. 5. Patient will perform home exercise program per protocol with modified independence within 4 days. 6. Patient will demonstrate AROM 0-90 degrees in operative joint within 4 days. Outcome: Progressing Towards Goal     PHYSICAL THERAPY TREATMENT  Patient: Freddy Huff (70 y.o. female)  Date: 10/26/2021  Diagnosis: Localized osteoarthritis of both knees [M17.0] <principal problem not specified>  Procedure(s) (LRB):  LEFT TOTAL KNEE ARTHROPLASTY (Left) 1 Day Post-Op  Precautions: Fall, WBAT  Chart, physical therapy assessment, plan of care and goals were reviewed. ASSESSMENT  Patient continues with skilled PT services and is progressing towards goals. Patient able to ambulate into the hallway today with RW. Slow but steady gait pattern. Decreased stance time on the L observed. Educated on [address] with the RW while turning. Reviewed LE exercises in supine. Introduced seated heel slide to promote L knee flexion today into a pain free range.  Patient does not have any stairs, ramp to enter. Follow up this PM.      Current Level of Function Impacting Discharge (mobility/balance): SBA with RW    Other factors to consider for discharge: Lives alone, Patient is fearful of going home alone, asking for rehab         PLAN :  Patient continues to benefit from skilled intervention to address the above impairments. Continue treatment per established plan of care. to address goals. Recommendation for discharge: (in order for the patient to meet his/her long term goals)  Physical therapy at least 2 days/week in the home . Patient asking for rehab as she lives alone. This discharge recommendation:  Has been made in collaboration with the attending provider and/or case management    IF patient discharges home will need the following DME: marcial rolling walker, will need to order if home discharge, still exploring rehab options       SUBJECTIVE:   Patient stated I live alone. I'm not sure how I will manage.  Patient reassured of her capabilities. OBJECTIVE DATA SUMMARY:   Critical Behavior:  Neurologic State: Alert  Orientation Level: Oriented X4  Cognition: Appropriate for age attention/concentration  Safety/Judgement: Good awareness of safety precautions    Range of Motion:  AROM: Within functional limits  PROM: Within functional limits      Functional Mobility Training:  Bed Mobility:  Supine to Sit:  (recieved sitting in the chair)  Sit to Supine:  (remained in the chair)  Scooting: Supervision     Transfers:  Sit to Stand: Contact guard assistance  Stand to Sit: Contact guard assistance                Bed to Chair: Contact guard assistance    Balance:  Sitting: Intact  Standing: Impaired; With support  Standing - Static: Constant support; Fair  Standing - Dynamic : Constant support; Fair    Ambulation/Gait Training:  Distance (ft): 110 Feet (ft)  Assistive Device: Gait belt;Walker, rolling  Ambulation - Level of Assistance: Stand-by assistance;Contact guard assistance; Adaptive equipment  Gait Abnormalities: Antalgic;Decreased step clearance  Base of Support: Widened  Stance: Left decreased  Speed/Idalmis: Slow;Shuffled  Step Length: Right shortened;Left shortened    Stairs:  Stairs - Level of Assistance:  (does not have stairs, ramp to enter)        Therapeutic Exercises:     EXERCISE   Sets   Reps   Active Active Assist   Passive Self ROM   Comments   Ankle Pumps 1 10 [x]                                        []                                        []                                        []                                           Quad Sets 1 5 [x]                                        []                                        []                                        []                                           Hamstring Sets 1 5 [x]                                        []                                        []                                        []                                           Short Arc Quads   []                                        []                                        []                                        []                                           Knee Extension Stretch     []                                          []                                          []                                          []                                           Heel Slides 1 2 [x]                                        []                                        []                                        []                                           Long Arc Quads   []                                        []                                        []                                        []                                           Knee Flexion Stretch   []                                        []                                        []                                        []                                           Straight Leg Raises   [] []                                        []                                        []                                               Pain Ratin/10, L knee pain    Activity Tolerance:   Good, SpO2 stable on RA, and requires rest breaks    After treatment patient left in no apparent distress:   Sitting in chair and Call bell within reach    COMMUNICATION/COLLABORATION:   The patients plan of care was discussed with: Registered nurse and Case management.      Rocky Thao, PT, DPT  Geriatric Clinical Specialist     Time Calculation: 23 mins

## 2021-10-26 NOTE — PROGRESS NOTES
Rounded on [facility] patients and provided Anointing of the Sick at request of patient.     Rebecca Ceja

## 2021-10-26 NOTE — PROGRESS NOTES
Bedside and Verbal shift change report given to Denisse Leyva (oncoming nurse) by Juan Gilman (offgoing nurse). Report included the following information SBAR, Kardex, Intake/Output and MAR.

## 2021-10-26 NOTE — PROGRESS NOTES
Pharmacist Note - Warfarin Dosing  Consult provided for this 57 y.o. female to manage warfarin for LEFT TOTAL KNEE ARTHROPLASTY     INR Goal: 1.8-2.2    Therapy Day: 2    Preop Dose: [place]- none    Drugs that may increase INR: None  Drugs that may decrease INR: None  Other current anticoagulants/ drugs that may increase bleeding risk: None  Risk factors: None  Daily INR ordered: YES    Recent Labs     10/26/21  0448   HGB 11.5   INR 1.0       Date               INR                 Dose  10/21  0.9  Pre-op  10/25  --  5 mg  10/26  1.0  5 mg    Assessment/ Plan: Will order warfarin 5 mg PO x 1 dose. Pharmacy will continue to monitor daily and adjust therapy as indicated.

## 2021-10-26 NOTE — PROGRESS NOTES
Problem: Self Care Deficits Care Plan (Adult)  Goal: *Acute Goals and Plan of Care (Insert Text)  Description: Occupational Therapy Goals  Initiated: 10/26/2021   1. Patient will perform grooming with supervision/set-up standing at sink within 3 day(s). 2.  Patient will perform bathing with supervision/set-up from chair within 3 day(s). 3.  Patient will perform upper body dressing and lower body dressing with supervision/set-up within 3 day(s). 4.  Patient will perform toilet transfers with supervision/set-up within 3 day(s). 5.  Patient will perform all aspects of toileting with supervision/set-up within 3 day(s). FUNCTIONAL STATUS PRIOR TO ADMISSION: Independent with basic ADL activities. She does not drive. She reports she uses a [employer] for all transport needs. She lives alone    HOME SUPPORT: None; reports her sister cannot come and stay with her. Outcome: Progressing Towards Goal     OCCUPATIONAL THERAPY EVALUATION  Patient: Richar Oneil (7 y.o. female)  Date: 10/26/2021  Primary Diagnosis: Localized osteoarthritis of both knees [M17.0]  Procedure(s) (LRB):  LEFT TOTAL KNEE ARTHROPLASTY (Left) 1 Day Post-Op   Precautions:   Fall, WBAT    ASSESSMENT  Based on the objective data described below, the patient presents with decreased independence with self care and functional mobility following admission for L TKR. She was able to progress with bathing from the chair level at mod A, dressing with mod A, toilet transfers with CG and cues. She continues to require reinforcement for hand placement and safety. She uses the walker well and demonstrates appropriate mobility to and from the bathroom but fatigues quickly. She was not able to stand for grooming longer than 1 minute due to fatigue. She reports she has no support at home. She desires to discharge to rehab setting once cleared from physician.   Recommend discharge home with 24/7 support but if family unable to provide support, she may need discharge to rehab facility. Recommend OOB to chair TID for meals. Recommend progression to and from bathroom with use of walker and gait belt for toileting. Current Level of Function Impacting Discharge (ADLs/self-care): mod A for basic ADLs    Functional Outcome Measure: The patient scored 55 on the Barthel Index outcome measure which is indicative of 45% impairment with ADl activities. Other factors to consider for discharge: debility, body habitus impacting LB access, pain     Patient will benefit from skilled therapy intervention to address the above noted impairments. PLAN :  Recommendations and Planned Interventions: self care training, functional mobility training, therapeutic exercise, balance training, therapeutic activities, endurance activities, patient education, home safety training, and family training/education    Frequency/Duration: Patient will be followed by occupational therapy 5 times a week to address goals. Recommendation for discharge: (in order for the patient to meet his/her long term goals)  To be determined: Home v. Rehab pending family support    This discharge recommendation:  Has been made in collaboration with the attending provider and/or case management    IF patient discharges home will need the following DME: none       SUBJECTIVE:   Patient stated I use the [employer].     OBJECTIVE DATA SUMMARY:   HISTORY:   Past Medical History:   Diagnosis Date    Anxiety and depression     [facility]    Bipolar Disorder     [facility]    Chronic pain     LEFT KNEE    DDD (degenerative disc disease), lumbosacral     L5-S1 ([facility])    Depression     Fatty liver 2018    Foot fracture, right     h/o    Genital herpes     GERD (gastroesophageal reflux disease)     History of gastritis     Dr. Tucker Guardian    Hyperlipidemia     Hypothyroidism (acquired)     Menopause     LMP- 51years old?     OA (osteoarthritis) knees, Dr. Tita Bennett    Obesity     NIKOLE on CPAP 2007    adherent with CPAP use    Symptomatic cholelithiasis 10/18/2017     Past Surgical History:   Procedure Laterality Date    HX COLONOSCOPY  9/20/12    colon polyp, repeat in 5 yrs, Dr. Yaa Chavez    HX ENDOSCOPY  5/19/16    gastritis, hiatal hernia (Dr. Gideon Sue)    HX LAP CHOLECYSTECTOMY  10/18/2018    Laparoscopic cholecystectomy by Dr. Ester Pompa. HX OTHER SURGICAL      cyst removed from scalp - benign       Expanded or extensive additional review of patient history:     Home Situation  Home Environment: Apartment  # Steps to Enter: 6  Rails to Enter: Yes  Hand Rails : Right  Wheelchair Ramp: No  One/Two Story Residence: One story  Living Alone: Yes  Support Systems: Friend/Neighbor  Patient Expects to be Discharged to[de-identified] Rehabilitation facility  Current DME Used/Available at Home: Walker, rolling, Raised toilet seat, Shower chair, Adaptive dressing aides  Tub or Shower Type: Tub/Shower combination    Hand dominance: Right    EXAMINATION OF PERFORMANCE DEFICITS:  Cognitive/Behavioral Status:  Neurologic State: Alert  Orientation Level: Oriented X4  Cognition: Appropriate for age attention/concentration  Perception: Appears intact  Perseveration: No perseveration noted  Safety/Judgement: Good awareness of safety precautions    Skin: see nursing notes; aquacel in place    Edema: none noted    Hearing: Auditory  Auditory Impairment: None    Vision/Perceptual:                           Acuity: Within Defined Limits         Range of Motion:    AROM: Within functional limits  PROM: Within functional limits                      Strength:    Strength: Within functional limits                Coordination:  Coordination: Within functional limits  Fine Motor Skills-Upper: Right Intact; Left Intact    Gross Motor Skills-Upper: Right Intact; Left Intact    Tone & Sensation:    Tone: Normal  Sensation: Intact                      Balance:  Sitting: Intact  Standing: Impaired; With support  Standing - Static: Constant support; Fair  Standing - Dynamic : Constant support; Fair    Functional Mobility and Transfers for ADLs:  Bed Mobility:  Supine to Sit:  (recieved sitting in the chair)  Sit to Supine:  (remained in the chair)  Scooting: Supervision    Transfers:  Sit to Stand: Contact guard assistance  Stand to Sit: Contact guard assistance  Bed to Chair: Contact guard assistance  Bathroom Mobility: Contact guard assistance  Toilet Transfer : Contact guard assistance    ADL Assessment:  Feeding: Independent    Oral Facial Hygiene/Grooming: Setup    Bathing: Moderate assistance    Upper Body Dressing: Minimum assistance    Lower Body Dressing: Moderate assistance    Toileting: Moderate assistance                ADL Intervention and task modifications:     Dressing training following TKR:  Pt participated with ADL routine while sitting on the commode and chair. Pt provided setup for sponge bathing and given instructions for safety and energy conservation with bathing activities. Pt completed lower body bathing with mod A. Pt provided training and education for donning clothing over surgical leg first.  Pt completed donning underpants with mod A, pants with mod A, socks with total A, and shoes with total A (not donned this morning). Pt did well with eduction and training but will benefit from reinforcement of education provided. Following intervention, pt left sitting in chair. Toileting:  Pt mobilized from the chair to the bathroom using RW. She requires cues for safety and sequencing while using the RW. She does well with education but needs reinforcement. She transferred on/off commode with CG using RW and grab bar. Recommend progression to and from the bathroom with nursing using gait belt and RW.                               Cognitive Retraining  Safety/Judgement: Good awareness of safety precautions      Functional Measure:    Barthel Index:  Bathin  Bladder: 10  Bowels: 10  Groomin  Dressin  Feeding: 10  Mobility: 0  Stairs: 0  Toilet Use: 5  Transfer (Bed to Chair and Back): 10  Total: 55/100      The Barthel ADL Index: Guidelines  1. The index should be used as a record of what a patient does, not as a record of what a patient could do. 2. The main aim is to establish degree of independence from any help, physical or verbal, however minor and for whatever reason. 3. The need for supervision renders the patient not independent. 4. A patient's performance should be established using the best available evidence. Asking the patient, friends/relatives and nurses are the usual sources, but direct observation and common sense are also important. However direct testing is not needed. 5. Usually the patient's performance over the preceding 24-48 hours is important, but occasionally longer periods will be relevant. 6. Middle categories imply that the patient supplies over 50 per cent of the effort. 7. Use of aids to be independent is allowed. Score Interpretation (from [address] [facility] 83)    Independent   60-79 Minimally independent   40-59 Partially dependent   20-39 Very dependent   <20 Totally dependent     -Luciano Partida., Barthel, DJesus AlbertoW. (1965). Functional evaluation: the Barthel Index. [address] [facility] ([address] (). The Barthel activities of daily living index: self-reporting versus actual performance in the old (> or = 75 years). Journal of [address] [place] [address] 45(7), [address] [facility], URMILA, Norberto Gordon., Becky Bustos. (). Measuring the change in disability after inpatient rehabilitation; comparison of the responsiveness of the Barthel Index and Functional [place] Measure. Journal of Neurology, Neurosurgery, and Psychiatry, 66(4), 179-650. Yaneth Valverde, N.J.A, HEATHER Cast, & Sherita Arias MSUMEET. (2004) Assessment of post-stroke quality of life in cost-effectiveness studies:  The usefulness of the Barthel Index and the EuroQoL-5D. Quality of Life Research, 15, 253-50         Occupational Therapy Evaluation Charge Determination   History Examination Decision-Making   LOW Complexity : Brief history review  HIGH Complexity : 5 or more performance deficits relating to physical, cognitive , or psychosocial skils that result in activity limitations and / or participation restrictions HIGH Complexity : Patient presents with comorbidities that affect occupational performance. Signifigant modification of tasks or assistance (eg, physical or verbal) with assessment (s) is necessary to enable patient to complete evaluation       Based on the above components, the patient evaluation is determined to be of the following complexity level: LOW   Pain Rating: Moderate pain    Activity Tolerance:   Fair    After treatment patient left in no apparent distress:    Sitting in chair, Call bell within reach, and Caregiver / family present    COMMUNICATION/EDUCATION:   The patients plan of care was discussed with: Physical therapist and Registered nurse. Home safety education was provided and the patient/caregiver indicated understanding. and Patient/family have participated as able in goal setting and plan of care. This patients plan of care is appropriate for delegation to [facility].     Thank you for this referral.  Mariia Jacques OT  Time Calculation: 37 mins

## 2021-10-26 NOTE — PROGRESS NOTES
Bedside shift change report given to Karina Packer RN and Nell Humphrey RN (oncoming nurse) by Amina Daniels (offgoing nurse). Report included the following information SBAR, Kardex, Procedure Summary, Intake/Output, MAR and Recent Results.

## 2021-10-27 LAB
INR PPP: 1.2 (ref 0.9–1.1)
PROTHROMBIN TIME: 12 SEC (ref 9–11.1)

## 2021-10-27 PROCEDURE — 97530 THERAPEUTIC ACTIVITIES: CPT

## 2021-10-27 PROCEDURE — 97535 SELF CARE MNGMENT TRAINING: CPT

## 2021-10-27 PROCEDURE — 74011250637 HC RX REV CODE- 250/637: Performed by: ORTHOPAEDIC SURGERY

## 2021-10-27 PROCEDURE — 74011250637 HC RX REV CODE- 250/637: Performed by: PHYSICIAN ASSISTANT

## 2021-10-27 PROCEDURE — 36415 COLL VENOUS BLD VENIPUNCTURE: CPT

## 2021-10-27 PROCEDURE — 97116 GAIT TRAINING THERAPY: CPT

## 2021-10-27 PROCEDURE — 85610 PROTHROMBIN TIME: CPT

## 2021-10-27 RX ORDER — OXYCODONE HYDROCHLORIDE 5 MG/1
5-10 TABLET ORAL
Qty: 42 TABLET | Refills: 0 | Status: SHIPPED | OUTPATIENT
Start: 2021-10-27 | End: 2021-11-03

## 2021-10-27 RX ORDER — WARFARIN 2 MG/1
TABLET ORAL
Qty: 90 TABLET | Refills: 0 | Status: SHIPPED | OUTPATIENT
Start: 2021-10-27 | End: 2022-03-16

## 2021-10-27 RX ORDER — AMOXICILLIN 250 MG
1 CAPSULE ORAL 2 TIMES DAILY
Qty: 50 TABLET | Refills: 0 | Status: SHIPPED | OUTPATIENT
Start: 2021-10-27 | End: 2022-03-16

## 2021-10-27 RX ADMIN — RISPERIDONE 3 MG: 1 TABLET ORAL at 21:01

## 2021-10-27 RX ADMIN — ACETAMINOPHEN 1000 MG: 500 TABLET ORAL at 00:18

## 2021-10-27 RX ADMIN — Medication 10 ML: at 21:02

## 2021-10-27 RX ADMIN — TRAZODONE HYDROCHLORIDE 50 MG: 50 TABLET ORAL at 21:02

## 2021-10-27 RX ADMIN — LAMOTRIGINE 200 MG: 100 TABLET ORAL at 21:01

## 2021-10-27 RX ADMIN — DOCUSATE SODIUM 50 MG AND SENNOSIDES 8.6 MG 1 TABLET: 8.6; 5 TABLET, FILM COATED ORAL at 08:42

## 2021-10-27 RX ADMIN — ACETAMINOPHEN 1000 MG: 500 TABLET ORAL at 23:14

## 2021-10-27 RX ADMIN — VENLAFAXINE HYDROCHLORIDE 225 MG: 150 CAPSULE, EXTENDED RELEASE ORAL at 08:42

## 2021-10-27 RX ADMIN — DOCUSATE SODIUM 50 MG AND SENNOSIDES 8.6 MG 1 TABLET: 8.6; 5 TABLET, FILM COATED ORAL at 17:17

## 2021-10-27 RX ADMIN — ACETAMINOPHEN 1000 MG: 500 TABLET ORAL at 17:17

## 2021-10-27 RX ADMIN — LEVOTHYROXINE SODIUM 25 MCG: 0.05 TABLET ORAL at 21:01

## 2021-10-27 RX ADMIN — ACETAMINOPHEN 1000 MG: 500 TABLET ORAL at 06:38

## 2021-10-27 RX ADMIN — WARFARIN SODIUM 6 MG: 5 TABLET ORAL at 12:40

## 2021-10-27 NOTE — PROGRESS NOTES
Pharmacist Note - Warfarin Dosing  Consult provided for this 57 y.o. female to manage warfarin for LEFT TOTAL KNEE ARTHROPLASTY     INR Goal: 1.8-2.2    Therapy Day: 3    Preop Dose: [place]- none    Drugs that may increase INR: None  Drugs that may decrease INR: None  Other current anticoagulants/ drugs that may increase bleeding risk: None  Risk factors: None  Daily INR ordered: YES    Recent Labs     10/27/21  0350 10/26/21  0448   HGB  --  11.5   INR 1.2* 1.0       Date               INR                 Dose  10/21  0.9  Pre-op  10/25  --  5 mg  10/26  1.0  5 mg  10/27  1.2  6 mg    Assessment/ Plan: Will order warfarin 6 mg PO x 1 dose. Pharmacy will continue to monitor daily and adjust therapy as indicated.

## 2021-10-27 NOTE — PROGRESS NOTES
Problem: Self Care Deficits Care Plan (Adult)  Goal: *Acute Goals and Plan of Care (Insert Text)  Description: Occupational Therapy Goals  Initiated: 10/26/2021   1. Patient will perform grooming with supervision/set-up standing at sink within 3 day(s). 2.  Patient will perform bathing with supervision/set-up from chair within 3 day(s). 3.  Patient will perform upper body dressing and lower body dressing with supervision/set-up within 3 day(s). 4.  Patient will perform toilet transfers with supervision/set-up within 3 day(s). 5.  Patient will perform all aspects of toileting with supervision/set-up within 3 day(s). FUNCTIONAL STATUS PRIOR TO ADMISSION: Independent with basic ADL activities. She does not drive. She reports she uses a [employer] for all transport needs. She lives alone    HOME SUPPORT: None; reports her sister cannot come and stay with her. Outcome: Progressing Towards Goal     OCCUPATIONAL THERAPY TREATMENT  Patient: Jarret Arango (60 y.o. female)  Date: 10/27/2021  Diagnosis: Localized osteoarthritis of both knees [M17.0] <principal problem not specified>  Procedure(s) (LRB):  LEFT TOTAL KNEE ARTHROPLASTY (Left) 2 Days Post-Op  Precautions: Fall, WBAT  Chart, occupational therapy assessment, plan of care, and goals were reviewed. ASSESSMENT  Patient continues with skilled OT services and is progressing towards goals. Pt continues to present below her baseline with impaired activity tolerance due to fatigue, decreased standing balance, and pain. Pt completed upper body bathing  and lower body bathing to the knee with set-up/supervision while seated EOB. She was unable to complete LB bathing below the knee due to impaired LB reach d/t body habitus. She completed dressing at EOB to doff/don nightgown with supervision. Pt performed sit<>stand with CGA. Supine<>sit performed with min A for management of surgical leg this session.  Pt declined further ambulation and ADL participation at this time due to significant fatigue. Continue to recommend SNF at d/c to maximize functional independence. Current Level of Function Impacting Discharge (ADLs): set-up to max A inferred for all ADLs     PLAN :  Patient continues to benefit from skilled intervention to address the above impairments. Continue treatment per established plan of care to address goals. Recommendation for discharge: (in order for the patient to meet his/her long term goals)  Therapy up to 5 days/week in SNF setting    This discharge recommendation:  Has been made in collaboration with the attending provider and/or case management       SUBJECTIVE:   Patient stated My knee is really hurting today.     OBJECTIVE DATA SUMMARY:   Cognitive/Behavioral Status:  Neurologic State: Alert  Orientation Level: Oriented X4  Cognition: Follows commands             Functional Mobility and Transfers for ADLs:  Bed Mobility:  Rolling: Contact guard assistance  Supine to Sit: Minimum assistance (for mgt of LLE )  Sit to Supine: Minimum assistance (for mgt of LLE)  Scooting: Contact guard assistance    Transfers:  Sit to Stand: Contact guard assistance          Balance:  Sitting: Intact  Standing: Intact; With support  Standing - Static: Constant support (RW)  Standing - Dynamic : Constant support (RW)    ADL Intervention:            Upper Body Bathing  Bathing Assistance: Stand-by assistance;Set-up  Position Performed: Seated edge of bed    Lower Body Bathing  Bathing Assistance: Contact guard assistance  Lower Body : Contact guard assistance (for upper thighs, impaired reach below knee d/t body habitus)  Position Performed: Seated edge of bed    Upper Body Dressing Assistance  Bra: Supervision (doff)  Pullover Shirt: Supervision (don/doff)  Cues: Verbal cues provided    Lower Body Dressing Assistance  Underpants: Supervision (mgt of underpants after dressing in standing)  Position Performed: Standing         Cognitive Retraining  Following Commands: Follows two step commands/directions    Pain:  Pt reporting a level 3/4 out of 10 pain throughout session. Provided with ice pack for relief. Activity Tolerance:   Fair and requires rest breaks    After treatment patient left in no apparent distress:   Supine in bed, Call bell within reach and Side rails x 3    COMMUNICATION/COLLABORATION:   The patients plan of care was discussed with: Physical therapist and Registered nurse.      Jimbo Lmaa OT  Time Calculation: 26 mins

## 2021-10-27 NOTE — PROGRESS NOTES
DATE OF PROCEDURE:  04/01/2019.    PREOPERATIVE DIAGNOSIS:  Right kidney stone.    POSTOPERATIVE DIAGNOSIS:  Right kidney stone.    PROCEDURES PERFORMED:  Right extracorporeal shockwave lithotripsy and   fluoroscopy.    PRIMARY SURGEON:  Demetrio Bryant M.D.    ANESTHESIA:  General.    ESTIMATED BLOOD LOSS:  Minimal.    DRAINS:  None.    COMPLICATIONS:  None.    INDICATIONS:  Fermin Henson is a 60-year-old male with history of right-sided   kidney stone.  It was recommended that he undergo ESWL as treatment of the   stone.    Fermin Henson was taken to the Operating Room where he was positively identified by   armband.  He was placed supine on the operating room table.  Following   induction of adequate general anesthesia, he was centered over the lithotripsy   machine.    A preoperative timeout was performed as well as confirmation of preoperative   antibiotics and a preoperative marking on the right side.    Using fluoroscopy, the stone was visualized in the sagittal and transverse   planes.    The lithotripter was then focused on the stone.    The stone was then fragmented using a total of 3000 shocks delivered to the   kidney stone.  Spot fluoroscopy was taken throughout the procedure.  The stone   was seen to be adequately fragmented by the end of the procedure.    His anesthesia was then reversed.  He was then taken to the Recovery Room in   stable condition.      JOLANTA  dd: 04/01/2019 08:09:18 (CDT)  td: 04/01/2019 08:26:39 (CDT)  Doc ID   #[id number]  Job ID #[id number]    CC:    I prayed with Porfirio Reno and gave her a blessing.   Father My Bowser

## 2021-10-27 NOTE — PROGRESS NOTES
[facility] visit. [facility] She was asleep at the time of the visit. She was visited by the [facility] this morning and received communion. Prayer offered for her well-being.     LYRIC Ayala, RN, ACSW, LCSW   Page:  [phone]

## 2021-10-27 NOTE — PROGRESS NOTES
Bedside shift change report given to Daniela Randall RN (oncoming nurse) by Tony Valle RN (offgoing nurse). Report included the following information SBAR, Kardex, Intake/Output and MAR.

## 2021-10-27 NOTE — PROGRESS NOTES
ORTHO PROGRESS NOTE    2021  Admit Date:   10/25/2021        Subjective:    Kisha Diane is a 57 y.o. WHITE/NON- female who is 2 Days Post-Op Procedure(s):  LEFT TOTAL KNEE ARTHROPLASTY. The patient states moderate pain, otherwise is without c/o at this time. The patient denies CP, SOB, N/V. Vital Signs:    Patient Vitals for the past 8 hrs:   BP Temp Pulse Resp SpO2   10/27/21 0425 (!) 123/58 98.3 °F (36.8 °C) 86 19 95 %     Temp (24hrs), Av.7 °F (37.1 °C), Min:98.3 °F (36.8 °C), Max:99.2 °F (37.3 °C)      Pain Control:   Pain Assessment  Pain Scale 1: Numeric (0 - 10)  Pain Intensity 1: 5  Pain Location 1: Knee  Pain Orientation 1: Left  Pain Description 1: Aching  Pain Intervention(s) 1: Position    LAB:    Recent Labs     10/27/21  0350 10/26/21  0448 10/26/21  0448   HGB  --   --  11.5   INR 1.2*   < > 1.0   NA  --   --  132*   K  --   --  3.6   CL  --   --  100   CO2  --   --  25   BUN  --   --  10   CREA  --   --  0.73   GLU  --   --  151*    < > = values in this interval not displayed. Assessment & Physician's Comment:  A&O x 3, NAD  Respirations unlabored  Abdomen S/NT  DF/EHL/PF intact  Distal pulses intact  Calves S/NT, SILT bilateral lower extremities  Dressing is clean, dry, and intact    Procedure:  Procedure(s):  LEFT TOTAL KNEE ARTHROPLASTY    Plan:  1) Pain Control: Adequate. 2) Hemodynamics: Stable. 3) Wound: Change dressing if saturated. 4) Activity: WBAT, mobilize with assist.  5) DVT Prophylaxis: Coumadin pharmacy dosing, SCD's, encourage ankle pump exercise, mobilize. 6) Disposition: Orthopaediclly stable for discharge. Plan on transfer to SNF/Rehab today if case management can make final arrangements. . Follow up in 3-4 weeks in Dr Lisa Hong office for post op care.          Jamaica Silveira PA-C

## 2021-10-28 VITALS
RESPIRATION RATE: 16 BRPM | WEIGHT: 257.5 LBS | DIASTOLIC BLOOD PRESSURE: 71 MMHG | TEMPERATURE: 98.5 F | HEART RATE: 97 BPM | SYSTOLIC BLOOD PRESSURE: 118 MMHG | HEIGHT: 62 IN | BODY MASS INDEX: 47.39 KG/M2 | OXYGEN SATURATION: 95 %

## 2021-10-28 LAB
COVID-19 RAPID TEST, COVR: NOT DETECTED
INR PPP: 1.1 (ref 0.9–1.1)
PROTHROMBIN TIME: 11.4 SEC (ref 9–11.1)
SOURCE, COVRS: NORMAL

## 2021-10-28 PROCEDURE — 74011250637 HC RX REV CODE- 250/637: Performed by: PHYSICIAN ASSISTANT

## 2021-10-28 PROCEDURE — 36415 COLL VENOUS BLD VENIPUNCTURE: CPT

## 2021-10-28 PROCEDURE — 97535 SELF CARE MNGMENT TRAINING: CPT

## 2021-10-28 PROCEDURE — 97116 GAIT TRAINING THERAPY: CPT

## 2021-10-28 PROCEDURE — 97530 THERAPEUTIC ACTIVITIES: CPT

## 2021-10-28 PROCEDURE — 85610 PROTHROMBIN TIME: CPT

## 2021-10-28 PROCEDURE — 87635 SARS-COV-2 COVID-19 AMP PRB: CPT

## 2021-10-28 RX ADMIN — VENLAFAXINE HYDROCHLORIDE 225 MG: 150 CAPSULE, EXTENDED RELEASE ORAL at 08:31

## 2021-10-28 RX ADMIN — ACETAMINOPHEN 1000 MG: 500 TABLET ORAL at 12:58

## 2021-10-28 RX ADMIN — ACETAMINOPHEN 1000 MG: 500 TABLET ORAL at 05:31

## 2021-10-28 RX ADMIN — DOCUSATE SODIUM 50 MG AND SENNOSIDES 8.6 MG 1 TABLET: 8.6; 5 TABLET, FILM COATED ORAL at 08:31

## 2021-10-28 RX ADMIN — POLYETHYLENE GLYCOL 3350 17 G: 17 POWDER, FOR SOLUTION ORAL at 08:30

## 2021-10-28 RX ADMIN — ACETAMINOPHEN 1000 MG: 500 TABLET ORAL at 17:26

## 2021-10-28 RX ADMIN — DOCUSATE SODIUM 50 MG AND SENNOSIDES 8.6 MG 1 TABLET: 8.6; 5 TABLET, FILM COATED ORAL at 17:26

## 2021-10-28 RX ADMIN — WARFARIN SODIUM 7.5 MG: 5 TABLET ORAL at 12:58

## 2021-10-28 NOTE — DISCHARGE INSTRUCTIONS
Discharge Instructions Knee Replacement  Dr. Doris Douglass      Patient Name  Hina Cano  Date of procedure  10/25/2021    Procedure  Procedure(s):  LEFT TOTAL KNEE ARTHROPLASTY  Surgeon  Surgeon(s) and Role:     Priti Rocha MD - Primary  Date of discharge: 10/28/2021  PCP: Sisi Thomson MD    Follow up care   Follow up visit with Dr. Doris Douglass in 3 weeks. Call [phone]  to make an appointment   You do have staples in your knee incision. They will be taken out in 14 days by [facility] staff. Activity at home   Take a short walk every hour; except at night when sleeping   Do your Home Exercise Program 3 times every day    After exercising lie down and elevate your leg on pillows for 15-30 minutes to decrease swelling   Refer to your patient notebook for more information    Bathing and caring for your incision   You may take a shower with your waterproof dressing on your knee.  The waterproof dressing is to stay on your knee for 7 days.  On the 7th day have someone gently peel the dressing off by lifting the edge and stretching it to break the seal.   You may then leave your incision open to air unless you see drainage from your knee. Preventing blood clots   Take Coumadin every day as prescribed.  Call Dr. Doris Douglass if you have side effects of blood thinning medication: bleeding, bruising, upset stomach, or diarrhea.  Call Dr. Doris Douglass for signs of a blood clot in your leg: calf pain, tenderness, redness, swelling of lower leg    Preventing lung congestion   Use your incentive spirometer 4 times a day; do 10 repetitions each time   Remember to keep the small blue ball between the two arrows when taking a slow, deep breath     Pain Management   Get up and walk a short distance to relieve pain and stiffness.  Place ice wrap on your knee except when you are walking.  The gel ice packs should be changed about every 4 hours   Elevate your leg on pillows for 15-30 minutes    Take Tylenol 650mg (take two 325mg tablets) every 6 hours for pain.  If needed, take a narcotic pain pill every 4-6 hours as prescribed.  Take all medications with a small amount of food.  As your pain decreases, take the narcotics less often or take ½ of a pill   Call Dr. Roger Suggs if you have side effects from your narcotic pain medication: itching, drowsiness, dizziness, upset stomach, dry mouth, constipation or if you medication is not relieving your pain. Diet after surgery   You may resume your normal diet. Include vegetables, fruit, whole grains, lean meats, and low-fat dairy products. Eat food high in fiber    Drink plenty of fluids, including 8 cups of water daily   Take Senokot-s twice a day to prevent constipation    Avoid after surgery   Do not take any over-the-counter medication for pain except Tylenol   Do not take more than 3000mg (3 grams) of Tylenol in 24 hours.  Do not drink alcoholic beverages   Do not smoke   Do not drive until seen for follow up appointment   Do not place frozen gel pack directly on your skin. It can cause frostbite.  Do not take a tub bath, swim or get in a hot tub for 6 weeks  Prevention of falls and safety at home   Set up an area where you can rest comfortably leaving space around furniture to allow you to walk with your walker   Keep stairs, hallways and bathrooms well lit; especially at night   Arrange for care for your pets   Keep your home free of clutter      Call Dr. Roger Suggs at [phone] for:   Pain that is not relieved by pain medication, ice and activity   Side effects of medications   Increased/spread of bruising   Warning signs of infection:  ? persistent fever greater than 100 degrees  ?  shaking or chills  ? increased redness, tenderness, swelling or drainage from incision  ? increased pain during activity or rest   Warning signs of a blood clot in your leg:  ? increased pain in your calf  ? tenderness or redness  ? increased swelling or knee, calf, ankle or foot    Call [phone] after 5pm or on a weekend.  The on call physician will return your phone call   Call your Primary Care Doctor for:    Concerns about your medical conditions such as diabetes, high blood pressure, asthma, congestive heart failure   Blood sugars greater than 180   Persistent headache or dizziness   Coughing or congestion   Constipation or diarrhea   Burning when you go to the bathroom   Abnormal heart rate (fast or slow)     Call 911 and go to the nearest hospital for:    Sudden increased shortness of breath   Sudden onset of chest pain   Difficulty breathing   Localized chest pain with coughing or taking a deep breath

## 2021-10-28 NOTE — PROGRESS NOTES
Problem: Self Care Deficits Care Plan (Adult)  Goal: *Acute Goals and Plan of Care (Insert Text)  Description: Occupational Therapy Goals  Initiated: 10/26/2021   1. Patient will perform grooming with supervision/set-up standing at sink within 3 day(s). 2.  Patient will perform bathing with supervision/set-up from chair within 3 day(s). 3.  Patient will perform upper body dressing and lower body dressing with supervision/set-up within 3 day(s). 4.  Patient will perform toilet transfers with supervision/set-up within 3 day(s). 5.  Patient will perform all aspects of toileting with supervision/set-up within 3 day(s). FUNCTIONAL STATUS PRIOR TO ADMISSION: Independent with basic ADL activities. She does not drive. She reports she uses a [employer] for all transport needs. She lives alone    HOME SUPPORT: None; reports her sister cannot come and stay with her. Outcome: Progressing Towards Goal     OCCUPATIONAL THERAPY TREATMENT  Patient: Nic Webber (9 y.o. female)  Date: 10/28/2021  Diagnosis: Localized osteoarthritis of both knees [M17.0] <principal problem not specified>  Procedure(s) (LRB):  LEFT TOTAL KNEE ARTHROPLASTY (Left) 3 Days Post-Op  Precautions: Fall, WBAT  Chart, occupational therapy assessment, plan of care, and goals were reviewed. ASSESSMENT  Patient continues with skilled OT services and is progressing towards goals. Patient presents today limited by decreased (but improving) endurance, impaired activity tolerance, impaired (but significantly improved) standing balance - still requiring bilateral support from RW, and pain. Performed supine<>sit with min A for mgt of LLE, sit<>stand and transfer to [facility] with CGA, Pt completed UB/LB bathing seated on BS in bathroom with set-up to min A d/t impaired reach to buttocks. She completed UB/LB dressing with supervision and up to min A to manage waistband of underpants.  Toileting and grooming (washing hands) standing at sink completed with mod I. Continue to recommend SNF at d/c to maximize functional independence and performance of ADL tasks. Current Level of Function Impacting Discharge (ADLs): mod I to min A inferred for all ADLs     Other factors to consider for discharge: PLOF         PLAN :  Patient continues to benefit from skilled intervention to address the above impairments. Continue treatment per established plan of care to address goals. Recommendation for discharge: (in order for the patient to meet his/her long term goals)  Therapy up to 5 days/week in SNF setting    This discharge recommendation:  Has been made in collaboration with the attending provider and/or case management       SUBJECTIVE:   Patient stated I feel much more confident today.     OBJECTIVE DATA SUMMARY:   Cognitive/Behavioral Status:  Neurologic State: Alert  Orientation Level: Oriented X4  Cognition: Appropriate decision making; Appropriate for age attention/concentration; Appropriate safety awareness; Follows commands        Safety/Judgement: Good awareness of safety precautions    Functional Mobility and Transfers for ADLs:  Bed Mobility:  Rolling: Stand-by assistance  Supine to Sit: Minimum assistance (mgt of LLE)  Sit to Supine: Minimum assistance (mgt of LLE)  Scooting: Contact guard assistance    Transfers:  Sit to Stand: Contact guard assistance  Functional Transfers  Bathroom Mobility: Contact guard assistance  Toilet Transfer : Contact guard assistance  Cues: Verbal cues provided (for placement and use of RW)  Adaptive Equipment: Grab bars  Bed to Chair: Contact guard assistance    Balance:  Sitting: Intact  Standing: Intact; With support    ADL Intervention:       Grooming  Grooming Assistance: Modified independent  Position Performed: Standing  Washing Hands: Modified independent    Upper Body Bathing  Bathing Assistance: Set-up  Position Performed:  (seated on BSC in bathroom)    Lower Body Bathing  Bathing Assistance: Contact guard assistance;Minimum assistance (up to min for buttocks)  Perineal  : Contact guard assistance  Position Performed:  (seated on BSC)  Lower Body : Contact guard assistance  Position Performed:  (seated on [facility])    Upper Body Dressing Assistance  Dressing Assistance: Supervision  Bra: Supervision  Pullover Shirt: Supervision    Lower Body Dressing Assistance  Dressing Assistance: Supervision  Underpants: Supervision (up to min A for pulling underpants up)  Pants With Elastic Waist: Supervision  Position Performed:  (seated on BS)    Toileting  Toileting Assistance: Modified independent  Bladder Hygiene: Modified independent  Bowel Hygiene: Modified indpendent  Clothing Management: Modified independent  Cues: Verbal cues provided;Physical assistance for pants down  Adaptive Equipment: Grab bars    Cognitive Retraining  Following Commands: Follows two step commands/directions  Safety/Judgement: Good awareness of safety precautions    Pain:  Patient reporting minimal pain at rest, and increasing pain with movement/ADL participation but able to participate in pursed lip breathing for pain control during activity. Activity Tolerance:   Good, improving, benefits from rest breaks    After treatment patient left in no apparent distress:   Supine in bed, Call bell within reach and Side rails x 3    COMMUNICATION/COLLABORATION:   The patients plan of care was discussed with: Physical therapist and Registered nurse.      Trey Marcus OT  Time Calculation: 40 mins

## 2021-10-28 NOTE — PROGRESS NOTES
ORTHO PROGRESS NOTE    2021  Admit Date:   10/25/2021        Subjective:    Yin Valentino is a 57 y.o. WHITE/NON- female who is 3 Days Post-Op Procedure(s):  LEFT TOTAL KNEE ARTHROPLASTY. The patient states mild/moderate pain, otherwise is without c/o at this time. The patient denies CP, SOB, N/V. Vital Signs:    Patient Vitals for the past 8 hrs:   BP Temp Pulse Resp SpO2   10/28/21 0225 108/62 98.5 °F (36.9 °C) 93 16 93 %     Temp (24hrs), Av.7 °F (37.1 °C), Min:98.4 °F (36.9 °C), Max:98.9 °F (37.2 °C)      Pain Control:   Pain Assessment  Pain Scale 1: Numeric (0 - 10)  Pain Intensity 1: 0  Pain Location 1: Knee  Pain Orientation 1: Left  Pain Description 1: Aching  Pain Intervention(s) 1: Medication (see MAR), Ice    LAB:    Recent Labs     10/28/21  0334 10/27/21  0350 10/26/21  0448   HGB  --   --  11.5   INR 1.1   < > 1.0   NA  --   --  132*   K  --   --  3.6   CL  --   --  100   CO2  --   --  25   BUN  --   --  10   CREA  --   --  0.73   GLU  --   --  151*    < > = values in this interval not displayed. Assessment & Physician's Comment:  A&O x 3, NAD  Respirations unlabored  Abdomen S/NT  DF/EHL/PF intact  Distal pulses intact  Calves S/NT, SILT bilateral lower extremities  Dressing is clean, dry, and intact    Procedure:  Procedure(s):  LEFT TOTAL KNEE ARTHROPLASTY    Plan:  1) Pain Control: Adequate, continue current regimen. 2) Hemodynamics: Stable. 3) Wound: Change dressing if saturated. 4) Activity: WBAT, mobilize with assist.  5) DVT Prophylaxis: Coumadin pharmacy dosing, SCD's, encourage ankle pump exercise, mobilize. 6) Disposition: Plan on home transfer to SNF/Rehab today if case management is able to make final arrangements. Follow up in 3-4 weeks in Dr Jess Llanes office for post op care.          Elizabeth Marley PA-C

## 2021-10-28 NOTE — PROGRESS NOTES
Problem: Mobility Impaired (Adult and Pediatric)  Goal: *Acute Goals and Plan of Care (Insert Text)  Description: FUNCTIONAL STATUS PRIOR TO ADMISSION: Patient was independent and active without use of DME.    HOME SUPPORT PRIOR TO ADMISSION: The patient lived alone with sister present in the room and nearby however patient reports to have no one provide assistance. Pt has RW from family however may require jr walker as she is shorter than the RW    Physical Therapy Goals  Initiated 10/25/2021    1. Patient will move from supine to sit and sit to supine , scoot up and down, and roll side to side in bed with modified independence within 4 days. 2. Patient will perform sit to stand with modified independence within 4 days. 3. Patient will ambulate with modified independence for 300 feet with the least restrictive device within 4 days. 4. Patient will ascend/descend 6 stairs with 1 handrail(s) with supervision/set-up within 4 days. 5. Patient will perform home exercise program per protocol with modified independence within 4 days. 6. Patient will demonstrate AROM 0-90 degrees in operative joint within 4 days. Outcome: Progressing Towards Goal    PHYSICAL THERAPY TREATMENT  Patient: Steph Franklin (51 y.o. female)  Date: 10/28/2021  Diagnosis: Localized osteoarthritis of both knees [M17.0] <principal problem not specified>  Procedure(s) (LRB):  LEFT TOTAL KNEE ARTHROPLASTY (Left) 3 Days Post-Op  Precautions: Fall, WBAT  Chart, physical therapy assessment, plan of care and goals were reviewed. ASSESSMENT  Patient continues with skilled PT services and is progressing towards goals. Ambulating in the hallway well with RW x 150 feet. Slow but steady gait pattern. Reviewed seated exercises, particularly those exercises promoting L knee flexion. Pain is most severe with this activity. Patient is also c/o of increased swelling. No redness, heat, or increased tenderness observed.  Suspect as pain is resolved, mobility will increase quickly. Plan is for a quick rehab stay, possibly going to [facility] today at 4 pm.     Other factors to consider for discharge: lives alone         PLAN :  Patient continues to benefit from skilled intervention to address the above impairments. Continue treatment per established plan of care. to address goals. Recommendation for discharge: (in order for the patient to meet his/her long term goals)  Therapy up to 5 days/week in SNF setting vs HHPT    This discharge recommendation:  Has been made in collaboration with the attending provider and/or case management    IF patient discharges home will need the following DME: marcial RW       SUBJECTIVE:   Patient stated The knee feels great sitting, but then I got bend it or take it off the bed and pain gets so bad that I start shaking    OBJECTIVE DATA SUMMARY:   Critical Behavior:  Neurologic State: Alert  Orientation Level: Oriented X4  Cognition: Appropriate decision making, Appropriate for age attention/concentration, Appropriate safety awareness, Follows commands  Safety/Judgement: Good awareness of safety precautions    Functional Mobility Training:     Transfers:  Sit to Stand: Contact guard assistance  Stand to Sit: Contact guard assistance  Bed to Chair: Contact guard assistance     Balance:  Sitting: Intact  Standing: Intact; With support    Ambulation/Gait Training:  Distance (ft): 150 Feet (ft)  Assistive Device: Gait belt;Walker, rolling  Ambulation - Level of Assistance: Stand-by assistance  Gait Abnormalities: Antalgic;Decreased step clearance  Base of Support: Widened  Stance: Left decreased  Speed/Idalmis: Slow;Shuffled  Step Length: Right shortened;Left shortened    Therapeutic Exercises:     EXERCISE   Sets   Reps   Active Active Assist   Passive Self ROM   Comments   Ankle Pumps   [x]                                        []                                        []                                        [] Quad Sets   [x]                                        []                                        []                                        []                                           Hamstring Sets   []                                        []                                        []                                        []                                           Short Arc Quads   []                                        []                                        []                                        []                                           Knee Extension Stretch     []                                          []                                          []                                          []                                           Heel Slides   [x]                                        []                                        []                                        []                                           Long Arc Quads   [x]                                        []                                        []                                        []                                           Knee Flexion Stretch   [x]                                        []                                        []                                        []                                           Straight Leg Raises   []                                        []                                        []                                        []                                               Pain Ratin/10 pain when returning to sitting, severe and causing Patient to shake all over    Activity Tolerance:   Good and SpO2 stable on RA    After treatment patient left in no apparent distress:   Sitting in chair and Call bell within reach    COMMUNICATION/COLLABORATION:   The patients plan of care was discussed with: Registered nurse.      Raymundo Hilliard, PT, DPT  Geriatric Clinical Specialist     Time Calculation: 23 mins

## 2021-10-28 NOTE — PROGRESS NOTES
TROY: The patient plans to go to [facility] today with [facility] transport scheduled for 4:15pm pickup. RN and patient notified of transport time. Negative covid test result needed prior to admission. RUR: N/A    MITCHEL spoke with liaison, Adrián Guy 148 ([phone]) and she has accepted the patient for admission today. RN to call report: [phone]. Transition of Care Plan to SNF/Rehab    SNF/Rehab Transition:  Patient has been accepted to [facility] and meets criteria for admission. Patient will transported by [facility] and expected to leave at 4:15pm.    Communication to Patient/Family:  Met with patient (identified care giver) and they are agreeable to the transition plan. Communication to SNF/Rehab:  Bedside RN, Reza Balderas, has been notified to update the transition plan to the facility and call report (phone number [phone]). Discharge information has been updated on the AVS.       Nursing Please include all hard scripts for controlled substances, med rec and dc summary, and AVS in packet. Reviewed and confirmed with facilityFelicita, can manage the patient care needs for the following:     Reviewed and confirmed with facility, Adrián Espinosa they can manage the patient care needs for the following:     Nnamdi Reyes with (X) only those applicable:    Medication:  [x]  Medications will be available at the facility  []  IV Antibiotics   []  Controlled Substance - hard copy to be sent with patient   []  Weekly Labs   Documents:  [x] Hard RX  [x] MAR  [x] Kardex  [x] AVS  [x]Transfer Summary  [x]Discharge   Equipment:  []  CPAP/BiPAP  []  Wound Vacuum  []  [place] or Urinary Device  []  PICC/Central Line  []  Nebulizer  []  Ventilator   Treatment:  []Isolation (for MRSA, VRE, etc.)  []Surgical Drain Management  []Tracheostomy Care  []Dressing Changes  []Dialysis with transportation and chair time.   []PEG Care  []Oxygen  []Daily Weights for Heart Failure   Dietary:  []Any diet limitations  []Tube Feedings   []Total Parenteral Management (TPN) Eligible for Medicaid Long Term Services and Supports  Yes:  [] Eligible for medical assistance or will become eligible within 180 days and UAI completed. [] Provider/Patient and/or support system has requested screening. [] UAI copy provided to patient or responsible party. [] UAI unavailable at discharge will send once processed to SNF provider. [] UAI unavailable at discharged mailed to patient  No:   [] Private pay and is not financially eligible for Medicaid within the next 180 days. [] Reside out-of-state. [] A residents of a state owned/operated facility that is licensed  by [facility] and Developmental Services or [facility]  [] Enrollment in [facility] hospice services  []  Medical [address]  [] Patient /Family declines to have screening completed or provide financial information for screening     Financial Resources:  Medicaid    [] Initiated and application pending   [] Full coverage     Advanced Care Plan:  []Surrogate Decision Maker of Care  []POA  []Communicated Code Status Full (DDNR\", \"Full\")    Other     Financial Resources:  Medicaid    [] Initiated and application pending   [] Full coverage     Advanced Care Plan:  []Surrogate Decision Maker of Care  []POA  []Communicated Code Status Full (DDNR\", \"Full\")    Other       CM following for discharge needs.     Caleb Carter RN/CRM

## 2021-10-28 NOTE — PROGRESS NOTES
Bedside and Verbal shift change report given to Laura (oncoming nurse) by Echo Zarate (offgoing nurse). Report included the following information SBAR, Kardex, OR Summary, Procedure Summary, Intake/Output, MAR and Recent Results.

## 2021-10-28 NOTE — PROGRESS NOTES
TRANSFER - OUT REPORT:    Verbal report given to Arlina Meigs (name) on Han Cummings  being transferred to [facility] (unit) for routine progression of care       Report consisted of patients Situation, Background, Assessment and   Recommendations(SBAR). Information from the following report(s) SBAR, Kardex, Intake/Output and MAR was reviewed with the receiving nurse. Lines:       Opportunity for questions and clarification was provided.       Patient transported with:  JOLENE

## 2021-11-05 VITALS — HEIGHT: 62 IN | BODY MASS INDEX: 47.11 KG/M2 | WEIGHT: 256 LBS

## 2021-11-05 PROBLEM — M25.559 HIP PAIN: Status: ACTIVE | Noted: 2021-11-05

## 2021-11-05 PROBLEM — M25.411 PAIN AND SWELLING OF RIGHT SHOULDER: Status: ACTIVE | Noted: 2021-11-05

## 2021-11-05 PROBLEM — M25.461 BILATERAL KNEE EFFUSIONS: Status: ACTIVE | Noted: 2021-11-05

## 2021-11-05 PROBLEM — G89.29 BILATERAL CHRONIC KNEE PAIN: Status: ACTIVE | Noted: 2021-11-05

## 2021-11-05 PROBLEM — M54.32 BILATERAL SCIATICA: Status: ACTIVE | Noted: 2021-11-05

## 2021-11-05 PROBLEM — M25.562 BILATERAL CHRONIC KNEE PAIN: Status: ACTIVE | Noted: 2021-11-05

## 2021-11-05 PROBLEM — M25.511 PAIN AND SWELLING OF RIGHT SHOULDER: Status: ACTIVE | Noted: 2021-11-05

## 2021-11-05 PROBLEM — Z78.9 WEIGHT ABOVE 97TH PERCENTILE: Status: ACTIVE | Noted: 2021-11-05

## 2021-11-05 PROBLEM — M54.31 BILATERAL SCIATICA: Status: ACTIVE | Noted: 2021-11-05

## 2021-11-05 PROBLEM — M75.111 INCOMPLETE TEAR OF RIGHT ROTATOR CUFF: Status: ACTIVE | Noted: 2021-11-05

## 2021-11-05 PROBLEM — M79.672 LEFT FOOT PAIN: Status: ACTIVE | Noted: 2021-11-05

## 2021-11-05 PROBLEM — M25.561 BILATERAL CHRONIC KNEE PAIN: Status: ACTIVE | Noted: 2021-11-05

## 2021-11-05 PROBLEM — M17.11 ARTHRITIS OF RIGHT KNEE: Status: ACTIVE | Noted: 2021-11-05

## 2021-11-05 PROBLEM — M25.462 BILATERAL KNEE EFFUSIONS: Status: ACTIVE | Noted: 2021-11-05

## 2021-11-17 ENCOUNTER — OFFICE VISIT (OUTPATIENT)
Dept: ORTHOPEDIC SURGERY | Age: 61
End: 2021-11-17

## 2021-11-17 VITALS — BODY MASS INDEX: 48.4 KG/M2 | WEIGHT: 263 LBS | HEIGHT: 62 IN

## 2021-11-17 DIAGNOSIS — M17.12 PRIMARY OSTEOARTHRITIS OF LEFT KNEE: Primary | ICD-10-CM

## 2021-11-17 PROCEDURE — 99024 POSTOP FOLLOW-UP VISIT: CPT | Performed by: PHYSICIAN ASSISTANT

## 2021-11-17 RX ORDER — WARFARIN 6 MG/1
6 TABLET ORAL DAILY
COMMUNITY
End: 2022-03-16

## 2021-11-17 RX ORDER — OXYCODONE HYDROCHLORIDE 5 MG/1
5 TABLET ORAL
COMMUNITY
End: 2021-11-23 | Stop reason: SDUPTHER

## 2021-11-17 NOTE — PROGRESS NOTES
Chief Complaint   Patient presents with    Post OP Follow Up     knee replacement left knee 10/25/21   Patient is in rehab at [facility]. Patient is scheduled to go home on 11/21/21    1. Have you been to the ER, urgent care clinic since your last visit? Hospitalized since your last visit? Yes 10/25/21     2. Have you seen or consulted any other health care providers outside of the [address] [place] [address] since your last visit? Include any pap smears or colon screening.  no

## 2021-11-17 NOTE — PROGRESS NOTES
Oneil Davila (: 1960) is a 57 y.o. female, established patient, here for evaluation of the following chief complaint(s):  Post OP Follow Up (knee replacement left knee 10/25/21)       ASSESSMENT/PLAN:  Below is the assessment and plan developed based on review of pertinent history, physical exam, labs, studies, and medications. Discussed radiographic findings, and answered all patient questions to her satisfaction. Oneil Davila is to continue DVT prophylaxis for 3 more weeks, then discontinue. I have provided the patient with a prescription for outpatient PT. Will plan on follow up for 8 weeks time for reevaluation with Dr Jackelyn Carroll. Patient was asked to contact the office with any questions or concerns. The patient understands and aggrees to the treatment plan as outlined above. 1. Primary osteoarthritis of left knee  -     XR KNEE LT 3 V; Future  -     REFERRAL TO PHYSICAL THERAPY      Return in about 2 months (around 2022) for post op follow up. SUBJECTIVE/OBJECTIVE:  Oneil Davila (: 1960) is a 57 y.o. female. Patient states she has made slow gradual gains. Patient still resides at [facility] rehab. Overall, patient states she is pleased with the results of her surgery and the progress which she has made to date. Left Total Knee Arthroplasty - Left10/     Allergies   Allergen Reactions    Flagyl [Metronidazole] Other (comments)     HEADACHES AND MENTAL DISTRESS       Current Outpatient Medications   Medication Sig    oxyCODONE IR (ROXICODONE) 5 mg immediate release tablet Take 5 mg by mouth every four (4) hours as needed for Pain.  warfarin (COUMADIN) 6 mg tablet Take 6 mg by mouth daily.  senna-docusate (PERICOLACE) 8.6-50 mg per tablet Take 1 Tablet by mouth two (2) times a day. Indications: constipation, opioid induced constipation    levothyroxine (SYNTHROID) 25 mcg tablet Take 1 Tablet by mouth nightly.     cholecalciferol, vitamin D3, (VITAMIN D3) 2,000 unit tab Take 4,000 Units by mouth nightly.  acetaminophen (TYLENOL) 325 mg tablet Take 650 mg by mouth every four (4) hours as needed for Pain.  venlafaxine (EFFEXOR) 75 mg tablet Take 75 mg by mouth daily. PT TAKES A TOTAL  mg EVERY MORNING    risperiDONE (RISPERDAL) 3 mg tablet Take 3 mg by mouth nightly.  cpap machine kit by Does Not Apply route. Dx 327.23    TRAZODONE HCL (TRAZODONE PO) Take 50 mg by mouth nightly as needed.  lamoTRIgine (LAMICTAL) 100 mg tablet Take 200 mg by mouth nightly.  warfarin (COUMADIN) 2 mg tablet Take 2 and 1/2 tabs daily or as directed by physician based on weekly PT/INR values. INR goal 1.7 - 2.2  Indications: deep vein thrombosis prevention (Patient not taking: Reported on 11/17/2021)    nystatin (MYCOSTATIN) powder Apply  to affected area three (3) times daily. (Patient not taking: Reported on 11/17/2021)    venlafaxine-SR (EFFEXOR XR) 150 mg capsule Take 150 mg by mouth daily. PT TAKES A TOTAL OF 225mg EVERY MORNING     No current facility-administered medications for this visit.        Social History     Socioeconomic History    Marital status: SINGLE     Spouse name: Not on file    Number of children: Not on file    Years of education: Not on file    Highest education level: Not on file   Occupational History    Not on file   Tobacco Use    Smoking status: Never Smoker    Smokeless tobacco: Never Used   Vaping Use    Vaping Use: Never used   Substance and Sexual Activity    Alcohol use: No     Alcohol/week: 0.0 standard drinks    Drug use: No     Comment: prescriptions    Sexual activity: Never   Other Topics Concern    Not on file   Social History Narrative    Not on file     Social Determinants of Health     Financial Resource Strain:     Difficulty of Paying Living Expenses: Not on file   Food Insecurity:     Worried About Running Out of Food in the Last Year: Not on file    [address] in the Last Year: Not on file   Transportation Needs:     Lack of Transportation (Medical): Not on file    Lack of Transportation (Non-Medical): Not on file   Physical Activity:     Days of Exercise per Week: Not on file    Minutes of Exercise per Session: Not on file   Stress:     Feeling of Stress : Not on file   Social Connections:     Frequency of Communication with Friends and Family: Not on file    Frequency of Social Gatherings with Friends and Family: Not on file    Attends Yazidi Services: Not on file    Active Member of [address] [place] [address] [employer] or Organizations: Not on file    Attends Club or Organization Meetings: Not on file    Marital Status: Not on file   Intimate Partner Violence:     Fear of Current or Ex-Partner: Not on file    Emotionally Abused: Not on file    Physically Abused: Not on file    Sexually Abused: Not on file   Housing Stability:     Unable to Pay for Housing in the Last Year: Not on file    Number of [place] in the Last Year: Not on file    Unstable Housing in the Last Year: Not on file       Past Surgical History:   Procedure Laterality Date    HX COLONOSCOPY  12    colon polyp, repeat in 5 yrs, Dr. Jose Spence  16    gastritis, hiatal hernia (Dr. Brenda Quintanilla)    HX LAP CHOLECYSTECTOMY  10/18/2018    Laparoscopic cholecystectomy by Dr. Osmel Sellers.     HX OTHER SURGICAL      cyst removed from scalp - benign       Family History   Problem Relation Age of Onset    Heart Disease Mother     Pulmonary Embolism Mother     Lung Disease Father     Diabetes Father     Heart Disease Father     COPD Father     Seizures Brother     Cancer Maternal Grandmother         OVARIAN, COLON    Diabetes Paternal Uncle     Diabetes Paternal Grandfather     Colon Cancer Other         materal grandmothers siblings x 3-4  of colon cancer    Celiac Disease Brother     Heart Disease Maternal Grandfather     Other Sister         PERIPHERAL ARTERY DISEASE    No Known Problems Sister     Anesth Problems Neg Hx         OB History    No obstetric history on file. REVIEW OF SYSTEMS:  Review of Systems   Musculoskeletal: Positive for joint pain. All other systems reviewed and are negative. Patient denies any recent fever, chills, nausea, vomiting, chest pain, or shortness of breath. Vitals:  Ht 5' 2\" (1.575 m)   Wt 263 lb (119.3 kg)   BMI 48.10 kg/m²    Body mass index is 48.1 kg/m². PHYSICAL EXAM:  Physical Exam     Postoperative wound is healing well without erythema or drainage. ROM of the operative extremity demonstrates -5 degrees with flexion to 95 degrees. Mild swelling of the operative extremity is noted. No calf tenderness, Distal motor and sensation is intact. IMAGING:    Results from Appointment encounter on 11/17/21    XR KNEE LT 3 V    Narrative  AP standing, lateral and sunrise digital view radiographs of the operative knee obtained in the office today and reviewed with the patient demonstrate anatomic alignment of components with no evidence of hardware loosening or subsidence. Orders Placed This Encounter    XR KNEE LT 3 V     ID     Standing Status:   Future     Number of Occurrences:   1     Standing Expiration Date:   11/18/2022    REFERRAL TO PHYSICAL THERAPY     Referral Priority:   Routine     Referral Type:   PT/OT/ST     Referral Reason:   Specialty Services Required     Requested Specialty:   Physical Therapy     Number of Visits Requested:   1         Dr. Keira Mata was available for immediate consultation as needed. An electronic signature was used to authenticate this note.   -- Rolanda Grant PA-C

## 2021-11-23 DIAGNOSIS — Z96.652 S/P TOTAL KNEE ARTHROPLASTY, LEFT: Primary | ICD-10-CM

## 2021-11-23 RX ORDER — OXYCODONE HYDROCHLORIDE 5 MG/1
5 TABLET ORAL
Qty: 40 TABLET | Refills: 0 | Status: SHIPPED | OUTPATIENT
Start: 2021-11-23 | End: 2021-12-07

## 2021-11-29 ENCOUNTER — TELEPHONE (OUTPATIENT)
Dept: INTERNAL MEDICINE CLINIC | Age: 61
End: 2021-11-29

## 2021-11-29 NOTE — TELEPHONE ENCOUNTER
Chief Complaint   Patient presents with    Request Records     Received incoming call on back line from [facility] w/ [facility]. Requesting copy of most recent office note faxed to her attn @ [phone].

## 2021-12-16 ENCOUNTER — OFFICE VISIT (OUTPATIENT)
Dept: ORTHOPEDIC SURGERY | Age: 61
End: 2021-12-16
Payer: MEDICARE

## 2021-12-16 DIAGNOSIS — R26.2 DIFFICULTY WALKING: ICD-10-CM

## 2021-12-16 DIAGNOSIS — M17.12 PRIMARY OSTEOARTHRITIS OF LEFT KNEE: ICD-10-CM

## 2021-12-16 DIAGNOSIS — Z96.652 S/P TOTAL KNEE ARTHROPLASTY, LEFT: Primary | ICD-10-CM

## 2021-12-16 PROCEDURE — 97110 THERAPEUTIC EXERCISES: CPT | Performed by: PHYSICAL THERAPIST

## 2021-12-16 PROCEDURE — 97162 PT EVAL MOD COMPLEX 30 MIN: CPT | Performed by: PHYSICAL THERAPIST

## 2021-12-16 NOTE — PROGRESS NOTES
Patient Initial Evaluation    Patient Name: Jono Rose  Date:2021  : 1960  [x]  Patient  Verified  Payor: [place] MEDICARE / Plan: [place] MEDICARE PART A & B / Product Type: Medicare /    Total Treatment Time (min): 45    Treatment Area: L knee    HPI    The patient is a 57year-old female referred to physical therapy by Dr. Richard Alvarez with a diagnosis of left total knee replacement completed . The patient states that she had physical therapy at [facility] rehabilitation for a month following the surgery. She then had a week and a half of home health physical therapy. She states that her knee has been doing well overall with minimal pain. She states that her mobility is more limited by her right knee and her left. she primarily uses a cane but does use a Rollator when she goes out or walks for prolonged periods of time. She is able to a send and descend stairs one at a time. Patient's primary goal is to walk without assistive device. OBJECTIVE    Gait: Antalgic gait pattern with decreased left terminal knee extension at terminal stance with use of Rollator. Range of motion: Right LE WNL, left knee active flexion to 100 degrees in seated, passive to 105 degrees, -5 degrees active knee extension, 0 degrees passive knee extension    Strength: RLE WNL, LLE grossly 4/5    Pain: Reported a visual analog scale 1/10 at rest    Swelling: Minimal    Incision: Healing well, no erythema or warmth noted proximal to incision    Palpation: Tenderness with palpation to the medial joint line    Joint mobility assessment: hypomobile anterior/posterior tibiofemoral glides, hypomobile patellar glides    Lower Extremity Functional Scale: 11/80    Special tests: Not indicated    Treatment: Full evaluation of the patient was completed.  The patient presents with decreased L knee flexion/ extension ROM, antalgic gait pattern with use of rollator, decreased left lower extremity strength, and decreased functional mobility. Patient would benefit from skilled therapy to address these limitations. Treatment consisted of retrograde massage to peripatellar structures for decreased joint swelling. Passive flexion/ extension and tibiofemoral and patellofemoral joint mobilizations. We discussed at length progressive plan of care and goals with the patient to develop current plan. We initiated ROM and functional strengthening exercises focusing on knee extension and active quad contraction. Home exercise program included heel slides, SLR, seated calf stretch, and L AQ's. Written and pictorial exercises were provided to the patient. We recommended utilization of ice and elevation for pain relief at home. Total treatment time 45 minutes. Ice posttreatment 10 minutes. ASSESSMENT    Long-term goal 4 weeks  1. Patient will ambulate with nonantalgic gait pattern with equal step lengths demonstrate left terminal knee extension at terminal stance without use of AD. 2.  L knee extension strength 5/5.  3.  L knee flexion  degrees. 4.  15 point improvement in lower extremity functional scale. ICD-10-CM ICD-9-CM    1. S/P total knee arthroplasty, left  Z96.652 V43.65    2. Primary osteoarthritis of left knee  M17.12 715.16    3. Difficulty walking  R26.2 719.7      Short-term goal 1 weeks. 1.  Independent demonstration of a home exercise program to facilitate recovery. Total treatment time today 45 min. PLAN OF CARE:    Treatment frequency 2X weekly. Duration 20 visits. Focus of therapy will be on progressive restoration of range of motion and strength, balance, and functional mobility. Therapeutic applications will include but are not limited to:  Home exercise program development and implementation with updating as needed. Intramuscular dry needling to the involved region. Manual therapy, joint mobilization, myofascial release, therapeutic exercises.     Modalities including ultrasound and electric stimulation heat and ice. Kinesiotape and [place] taping for joint reeducation and approximation of tissue for neuromuscular reeducation.

## 2021-12-20 ENCOUNTER — OFFICE VISIT (OUTPATIENT)
Dept: ORTHOPEDIC SURGERY | Age: 61
End: 2021-12-20
Payer: MEDICARE

## 2021-12-20 DIAGNOSIS — R26.2 DIFFICULTY WALKING: ICD-10-CM

## 2021-12-20 DIAGNOSIS — Z96.652 S/P TOTAL KNEE ARTHROPLASTY, LEFT: Primary | ICD-10-CM

## 2021-12-20 PROCEDURE — 97140 MANUAL THERAPY 1/> REGIONS: CPT | Performed by: PHYSICAL THERAPIST

## 2021-12-20 PROCEDURE — 97110 THERAPEUTIC EXERCISES: CPT | Performed by: PHYSICAL THERAPIST

## 2021-12-20 NOTE — PROGRESS NOTES
PT DAILY TREATMENT NOTE    Patient Name: Livan Bernardo  Date:2021  : 1960  [x]  Patient  Verified  Payor: [employer] / Plan: [place] / Product Type: Medicare /    Total Treatment Time 1:1 (min): 45    Treatment Area: L knee    SUBJECTIVE  Subjective functional status/changes:   [] No changes reported  Patient states that her home exercises have been going well overall but was unable to do them over the weekend. OBJECTIVE  Modality (rationale):   []  E-Stim: type _ x _ min     []att   []unatt   []w/US   []w/ice   []w/heat  []  Traction: []cerv   []pelvic   _ lbs x _ min     []pro   []sup   []int   []const  []  Ultrasound: []cont   []pulse    _ W/cm2 x _  min   []1MHz   []3MHz  []  Iontophoresis: []take home patch w/ dexamethazone    []40mA   []80mA                               []_ mA min w/: []dexamethazone   []other:_  []  Ice pack _  min     [] Hot pack _  min     [] Paraffin _  min  []  Other:     Therapeutic Exercise: [x] see flow sheet     [] Other:_  Added/Changed Exercises:  []  Added:_  to improve (function):  []  Changed:_ to improve (function):  (minutes: 30)      ROM     STRENGTHENING   NEURO RE-ED     DYNAMIC    [x] PROM    [x] TKE: theraband   [] NMES 10/10 x 10'   [] Hurdles  [] Slantboard    [] 3-way SLR     [] Tandem stand   [] Sliders                    [] AAROM     [x] SAQ 2 lbs.                      [] ABC's w/ball in SLS                 [] Ladders  [] Pro-stretch    [] LAQ    [] BOSU March   [] Braiding  [] Knee Ext Stretch 5#  [] bridges with band   [] BOSU Lateral Step-overs  []         [] Hamstring stretch   [] Side-lying Hip ABD   [] Ball Toss Rebounder  []                               [] Calf Stretch   [] Step-ups    [x] Balance Board      [] ITB Stretch      [] Side-step Ups   []  [x] Heel Slides                          [] Lateral walk                                    []     [] Supine Clams with TB         [x] Leg Press WARM UP    [] Hip Hikes       [] Total Gym Squats            [] Bike 10 MIN    [] MULTI - HIP                                                           [x] Nu-Step 10 MIN   [] Total Gym Heel Raises                                                         [] Dynamic LILLY   [] Total Gym Eccentric Heel  []     [] Standing HS curls             [] Bridge on BOSU      [] Eccentric Step-Downs   [] RDLs    Manual Therapy: Treatment consisted of grade 3 patellofemoral mobilizations. Scar mobilization. STM of distal quad and ITB. Manual gastroc stretch. Grade 4 anterior and posterior tibiofemoral mobilizations. Left knee range of motion 0 to 108 degrees. (minutes: 15 )    ASSESSMENT  []  See Plan of Care  []  See progress note/recertification  [x]  Patient will continue to benefit from skilled therapy to address remaining functional deficits:     Knee flexion range of motion, antalgic gait pattern, and stair mobility. ICD-10-CM ICD-9-CM    1. S/P total knee arthroplasty, left  Z96.652 V43.65    2. Difficulty walking  R26.2 719.7      Progress towards goals / Updated goals:    PLAN  []  Upgrade activities as tolerated     [x]  Continue plan of care  []  Discharge due to:_  [] Other:_      Patient is going out of town and will cont continue home exercise program independently over the next 2 weeks and follow-up at that time.     Hanane De Leon, PT 12/20/2021  3:40 PM

## 2021-12-22 ENCOUNTER — OFFICE VISIT (OUTPATIENT)
Dept: ORTHOPEDIC SURGERY | Age: 61
End: 2021-12-22
Payer: MEDICARE

## 2021-12-22 DIAGNOSIS — Z96.652 S/P TOTAL KNEE ARTHROPLASTY, LEFT: Primary | ICD-10-CM

## 2021-12-22 DIAGNOSIS — R26.2 DIFFICULTY WALKING: ICD-10-CM

## 2021-12-22 PROCEDURE — 97110 THERAPEUTIC EXERCISES: CPT | Performed by: PHYSICAL THERAPIST

## 2021-12-22 PROCEDURE — 97140 MANUAL THERAPY 1/> REGIONS: CPT | Performed by: PHYSICAL THERAPIST

## 2021-12-22 NOTE — PROGRESS NOTES
PT DAILY TREATMENT NOTE    Patient Name: Prasad Kerr  Date:2021  : 1960  [x]  Patient  Verified  Payor: Jatinder [employer] / Plan: [place] MEDICARE PART A [place] / Product Type: Medicare /    Total Treatment Time 1:1 (min): 30    Treatment Area: L knee    SUBJECTIVE  Subjective functional status/changes:   [] No changes reported  Patient states that her home exercises have been going well. She is now walking with a cane regularly. OBJECTIVE  Modality (rationale):   []  E-Stim: type _ x _ min     []att   []unatt   []w/US   []w/ice   []w/heat  []  Traction: []cerv   []pelvic   _ lbs x _ min     []pro   []sup   []int   []const  []  Ultrasound: []cont   []pulse    _ W/cm2 x _  min   []1MHz   []3MHz  []  Iontophoresis: []take home patch w/ dexamethazone    []40mA   []80mA                               []_ mA min w/: []dexamethazone   []other:_  []  Ice pack _  min     [] Hot pack _  min     [] Paraffin _  min  []  Other:     Therapeutic Exercise: [x] see flow sheet     [] Other:_  Added/Changed Exercises:  []  Added:_  to improve (function):  []  Changed:_ to improve (function):  (minutes: 30)      ROM     STRENGTHENING   NEURO RE-ED     DYNAMIC    [x] PROM    [x] TKE: theraband   [] NMES 10/10 x 10'   [] Hurdles  [] Slantboard    [] 3-way SLR     [] Tandem stand   [] Sliders                    [] AAROM     [x] SAQ 2 lbs.                      [] ABC's w/ball in SLS                 [] Ladders  [] Pro-stretch    [] LAQ    [] BOSU March   [] Braiding  [] Knee Ext Stretch 5#  [] bridges with band   [] BOSU Lateral Step-overs  []         [] Hamstring stretch   [] Side-lying Hip ABD   [] Ball Toss Rebounder  []                               [] Calf Stretch   [] Step-ups    [x] Balance Board      [] ITB Stretch      [] Side-step Ups   []  [x] Heel Slides                          [] Lateral walk                                    []     [] Supine Clams with TB         [x] Leg Press WARM UP    [] Hip Hikes       [] Total Gym Squats            [] Bike 10 MIN    [] MULTI - HIP                                                           [x] Nu-Step 10 MIN   [] Total Gym Heel Raises                                                         [] Dynamic LILLY   [] Total Gym Eccentric Heel  []     [] Standing HS curls             [] Bridge on BOSU      [] Eccentric Step-Downs   [] RDLs    Manual Therapy: Treatment consisted of grade 3 patellofemoral mobilizations. Scar mobilization. STM of distal quad and ITB. Manual gastroc stretch. Grade 4 anterior and posterior tibiofemoral mobilizations. Left knee range of motion 0 to 108 degrees. (minutes: 15 )    ASSESSMENT  []  See Plan of Care  []  See progress note/recertification  [x]  Patient will continue to benefit from skilled therapy to address remaining functional deficits:     Knee flexion range of motion, antalgic gait pattern, and stair mobility. ICD-10-CM ICD-9-CM    1. S/P total knee arthroplasty, left  Z96.652 V43.65    2. Difficulty walking  R26.2 719.7      Progress towards goals / Updated goals:    PLAN  []  Upgrade activities as tolerated     [x]  Continue plan of care  []  Discharge due to:_  [] Other:_      Patient is going out of town and will continue home exercise program independently over the next 2 weeks.     Christine Reyes, PT 12/22/2021  3:40 PM

## 2022-01-05 ENCOUNTER — OFFICE VISIT (OUTPATIENT)
Dept: ORTHOPEDIC SURGERY | Age: 62
End: 2022-01-05
Payer: MEDICARE

## 2022-01-05 DIAGNOSIS — Z96.652 S/P TOTAL KNEE ARTHROPLASTY, LEFT: Primary | ICD-10-CM

## 2022-01-05 DIAGNOSIS — R26.2 DIFFICULTY WALKING: ICD-10-CM

## 2022-01-05 PROCEDURE — 97140 MANUAL THERAPY 1/> REGIONS: CPT | Performed by: PHYSICAL THERAPIST

## 2022-01-05 PROCEDURE — 97110 THERAPEUTIC EXERCISES: CPT | Performed by: PHYSICAL THERAPIST

## 2022-01-05 NOTE — PROGRESS NOTES
PT DAILY TREATMENT NOTE    Patient Name: Nita Negron  Date:2022  : 1960  [x]  Patient  Verified  Payor: [employer]heng [employer] / Plan: [place] MEDICARE PART A [employer] B / Product Type: Medicare /    Total Treatment Time 1:1 (min): 30    Treatment Area: L knee    SUBJECTIVE  Subjective functional status/changes:   [] No changes reported  Patient states that her knee has been doing very well overall. She is now able to go up and down steps without issue. OBJECTIVE  Modality (rationale):   []  E-Stim: type _ x _ min     []att   []unatt   []w/US   []w/ice   []w/heat  []  Traction: []cerv   []pelvic   _ lbs x _ min     []pro   []sup   []int   []const  []  Ultrasound: []cont   []pulse    _ W/cm2 x _  min   []1MHz   []3MHz  []  Iontophoresis: []take home patch w/ dexamethazone    []40mA   []80mA                               []_ mA min w/: []dexamethazone   []other:_  []  Ice pack _  min     [] Hot pack _  min     [] Paraffin _  min  []  Other:     Therapeutic Exercise: [x] see flow sheet     [] Other:_  Added/Changed Exercises:  []  Added:_  to improve (function):  []  Changed:_ to improve (function):  (minutes: 30)      ROM     STRENGTHENING   NEURO RE-ED     DYNAMIC    [x] PROM    [x] TKE: theraband   [] NMES 10/10 x 10'   [] Hurdles  [x] Slantboard    [] 3-way SLR     [] Tandem stand   [] Sliders                    [] AAROM     [x] SAQ 2 lbs.                      [] ABC's w/ball in SLS                 [] Ladders  [] Pro-stretch    [] LAQ    [] BOSU March   [] Braiding  [] Knee Ext Stretch 5#  [] bridges with band   [] BOSU Lateral Step-overs  []         [] Hamstring stretch   [] Side-lying Hip ABD   [] Ball Toss Rebounder  []                               [] Calf Stretch   [] Step-ups    [x] Balance Board      [] ITB Stretch      [] Side-step Ups   []  [x] Heel Slides                          [] Lateral walk                                    []     [] Supine Clams with TB         [x] Leg Press WARM UP    [] Hip Hikes       [] Total Gym Squats            [] Bike 10 MIN    [] MULTI - HIP                                                           [x] Nu-Step 10 MIN   [] Total Gym Heel Raises                                                         [] Dynamic LILLY   [] Total Gym Eccentric Heel  []     [] Standing HS curls             [] Bridge on BOSU      [] Eccentric Step-Downs   [] RDLs    Manual Therapy: Treatment consisted of grade 3 patellofemoral mobilizations. Scar mobilization. STM of distal quad and ITB. Manual gastroc stretch. Grade 4 anterior and posterior tibiofemoral mobilizations. Left knee range of motion 0 to 112 degrees. (minutes: 15 )    ASSESSMENT  []  See Plan of Care  []  See progress note/recertification  [x]  Patient will continue to benefit from skilled therapy to address remaining functional deficits: knee extension strength. ICD-10-CM ICD-9-CM    1. S/P total knee arthroplasty, left  Z96.652 V43.65    2. Difficulty walking  R26.2 719.7      Progress towards goals / Updated goals:    PLAN  []  Upgrade activities as tolerated     [x]  Continue plan of care  []  Discharge due to:_  [] Other:_      Focus on restoring knee extension strength and improving functional mobility.     Brenda Jackson, PT 1/5/2022  3:40 PM

## 2022-01-10 ENCOUNTER — OFFICE VISIT (OUTPATIENT)
Dept: ORTHOPEDIC SURGERY | Age: 62
End: 2022-01-10
Payer: MEDICARE

## 2022-01-10 DIAGNOSIS — Z96.652 S/P TOTAL KNEE ARTHROPLASTY, LEFT: Primary | ICD-10-CM

## 2022-01-10 DIAGNOSIS — R26.2 DIFFICULTY WALKING: ICD-10-CM

## 2022-01-10 PROCEDURE — 97110 THERAPEUTIC EXERCISES: CPT | Performed by: PHYSICAL THERAPIST

## 2022-01-10 PROCEDURE — 97140 MANUAL THERAPY 1/> REGIONS: CPT | Performed by: PHYSICAL THERAPIST

## 2022-01-10 NOTE — PROGRESS NOTES
PT DAILY TREATMENT NOTE    Patient Name: Honorio Lynch  Date:1/10/2022  : 1960  [x]  Patient  Verified  Payor: Nabeel Novoa / Plan: [place] MEDICARE PART A [employer] B / Product Type: Medicare /    Total Treatment Time 1:1 (min): 45    Treatment Area: L knee    SUBJECTIVE  Subjective functional status/changes:   [] No changes reported  Patient states that her knee has been doing very well overall. She is now able to go up and down steps without issue. OBJECTIVE  Modality (rationale):   []  E-Stim: type _ x _ min     []att   []unatt   []w/US   []w/ice   []w/heat  []  Traction: []cerv   []pelvic   _ lbs x _ min     []pro   []sup   []int   []const  []  Ultrasound: []cont   []pulse    _ W/cm2 x _  min   []1MHz   []3MHz  []  Iontophoresis: []take home patch w/ dexamethazone    []40mA   []80mA                               []_ mA min w/: []dexamethazone   []other:_  []  Ice pack _  min     [] Hot pack _  min     [] Paraffin _  min  []  Other:     Therapeutic Exercise:     PT Exercise Log        Activity/Exercise Date  01/10/22    Activity/Exercise      NuStep   X      Slant board  X     TKE   X       Leg press 100 LB  X     Heel slide  X     S AQ with 3 LB  X   Balance board   X   Marching standing   X     Tandem stance  X            (minutes: 30)    Manual Therapy: Treatment consisted of grade 3 patellofemoral mobilizations. Scar mobilization. STM of distal quad and ITB. Manual gastroc stretch. Grade 4 anterior and posterior tibiofemoral mobilizations. Left knee range of motion 0 to 115 degrees. (minutes: 15 )    ASSESSMENT  []  See Plan of Care  []  See progress note/recertification  [x]  Patient will continue to benefit from skilled therapy to address remaining functional deficits: knee extension strength. Patient has been making good progress with range of motion and strength. She states that her primary limitation is her balance. ICD-10-CM ICD-9-CM    1.  S/P total knee arthroplasty, left R34.877 V43.65    2. Difficulty walking  R26.2 719.7      Progress towards goals / Updated goals:    PLAN  []  Upgrade activities as tolerated     [x]  Continue plan of care  []  Discharge due to:_  [] Other:_      Focus on progressing balance interventions to progress toward long-term goals.     Tegan Lagunas, PT 1/10/2022  3:40 PM

## 2022-01-11 NOTE — PROGRESS NOTES
Patient Seen in: [facility] AND [facility] Emergency Department    History   Patient presents with:  Abdomen/Flank Pain    Stated Complaint: abdominal pain, hx. of diverticulitis    HPI    Patient complains of  llq  abdominal pain that began couple days ago.   Pa Subjective:     Chief Complaint   Patient presents with   "[employer]     She  is a 61y.o. year old female who presents for evaluation. New patient to me. History of bipolar disorder / HLD / hypothyroidism / NIKOLE  Has some RUQ pain on occasion. Has ear itch and concern. Stopped pravastatin because it made her feel tired and depressed. Has a history of vitamin D deficiency but forgets to take her supplement. Took omeprazole for a while but not taking it currently. Having discomfort again. Historical Data    Past Medical History:   Diagnosis Date    Anxiety and depression     [facility]    Bipolar Disorder     [facility]    DDD (degenerative disc disease), lumbosacral     L5-S1 ([facility])    Foot fracture, right     h/o    Genital herpes     GERD (gastroesophageal reflux disease)     History of gastritis     Dr. Kelvin Schwartz    Hyperlipidemia     Hypothyroidism (acquired)     OA (osteoarthritis)     knees, Dr. Kathi Lafleur    Obesity     NIKOLE on CPAP 2007    adherent with CPAP use        Past Surgical History:   Procedure Laterality Date    HX COLONOSCOPY  9/20/12    colon polyp, repeat in 5 yrs, Dr. Taj Webber HX ENDOSCOPY  5/19/16    gastritis, hiatal hernia (Dr. Kelvin Schwartz)    HX OTHER SURGICAL      cyst removed from scalp - benign        Outpatient Encounter Prescriptions as of 6/27/2017   Medication Sig Dispense Refill    venlafaxine-SR (EFFEXOR XR) 150 mg capsule Take  by mouth daily.  levothyroxine (SYNTHROID) 25 mcg tablet Take 1 Tab by mouth Daily (before breakfast). 90 Tab 1    risperiDONE (RISPERDAL) 3 mg tablet Take 3 mg by mouth daily.  cpap machine kit by Does Not Apply route. Dx 327.23 1 Kit 0    TRAZODONE HCL (TRAZODONE PO) Take 25-50 mg by mouth nightly as needed.  lamoTRIgine (LAMICTAL) 100 mg tablet Take 200 mg by mouth nightly.  DULoxetine (CYMBALTA) 20 mg capsule Take 40 mg by mouth daily.       esomeprazole (NEXIUM) 40 mg capsule Take 1 Cap by mouth daily. 1 Cap 0    pravastatin (PRAVACHOL) 10 mg tablet Take 1 Tab by mouth nightly. 90 Tab 1    Cholecalciferol, Vitamin D3, (VITAMIN D3) 2,000 unit cap capsule Take  by mouth daily.  OTHER FOLDABLE Walker with SEAT, use as directed. Dx: 715.96, 278.00 1 Each 0     No facility-administered encounter medications on file as of 6/27/2017. Allergies   Allergen Reactions    Flagyl [Metronidazole] Other (comments)     ams        Social History     Social History    Marital status: SINGLE     Spouse name: N/A    Number of children: N/A    Years of education: N/A     Occupational History    Not on file. Social History Main Topics    Smoking status: Never Smoker    Smokeless tobacco: Never Used    Alcohol use No    Drug use: No      Comment: prescriptions    Sexual activity: No     Other Topics Concern    Not on file     Social History Narrative        Review of Systems  A comprehensive review of systems was negative except for that written in the HPI. Objective:     Vitals:    06/27/17 0923   BP: 130/80   Pulse: 97   Resp: 18   Temp: 98.5 °F (36.9 °C)   TempSrc: Oral   SpO2: 97%   Weight: 268 lb 8 oz (121.8 kg)   Height: 5' 1.25\" (1.556 m)     Skin: Warm with normal turgor. No macules, papules or stria. HEENT:    Head:  Normocephalic, atraumatic   Ears:  Canals slightly erythematous and flaky. Eyes:  PERRLA. EOMI. Neck: Supple. No masses. No adenopathy. Cardiac: Regular rate and rhythm. No murmurs, gallops or rubs. Lungs: Clear to ausculation. No wheezes, rhonchi or rales. Abdomen: Obese. Mildly tender in epigastrium and RUQ region. Extremities: Pulses intact. No swelling. Musculoskeletal: No joint effusions. Neurologic:    Motor:  Symmetric and full. Reflexes:  Symmetric and full. ASSESSMENT / PLAN:   1. Routine general medical examination at a health care facility    - Depression Screen Annual    2.  Breast cancer COMPLETE  2013    Scan to media tab 2013   • HYSTERECTOMY     • KNEE SCOPE,MED/LAT MENISECTOMY Left 2016    Procedure: ARTHROSCOPY KNEE LEFT;  Surgeon: Matt Varner MD;  Location: [address] [place] [address]   •      • OTHER      thumb Never Used    Vaping Use      Vaping Use: Never used    Alcohol use: Yes      Comment: Vodka, 2 drinks, yearly    Drug use: No      Review of Systems    Positive for stated complaint: abdominal pain, hx. of diverticulitis  Other systems are as noted in HPI screening    - [facility] MAMMO BI SCREENING INCL CAD; Future    3. Encounter for screening mammogram for malignant neoplasm of breast     - [facility] MAMMO BI SCREENING INCL CAD; Future    4. Morbid obesity, unspecified obesity type  I have reviewed/discussed the above normal BMI with the patient. I have recommended the following interventions: dietary management education, guidance, and counseling . Feliciano Magdaleno 5. Pure hypercholesterolemia  · Re-evaluate need for statin. - LIPID PANEL  - METABOLIC PANEL, COMPREHENSIVE    6. Hypothyroidism, unspecified type  · Continue thyroid replacement therapy    7. Vitamin D deficiency  · Re-evaluate level and likely resume supplementation.  - VITAMIN D, 25 HYROXY PANEL    8. RUQ pain  · Evaluate for GB disease  -  ABD LTD; Future    9. Screening for alcoholism      10. Screening for depression    - Depression Screen Annual    Patient Instructions     Follow a calorie controlled, Mediterranean style diet. Lose 20 pounds over the next 4 months. Have a mammogram.  Have an ultrasound of gallbladder. Continue your current medications. Your colonoscopy follow up is due this fall. Learning About the [address] [facility] [employer] Diet  What is the Mediterranean diet? The Mediterranean diet is a style of eating rather than a diet plan. It features foods eaten in [place], [place], [place] and [place], and other countries along the [place]. It emphasizes eating foods like fish, fruits, vegetables, beans, high-fiber breads and whole grains, nuts, and olive oil. This style of eating includes limited red meat, cheese, and sweets. Why choose the Mediterranean diet? A Mediterranean-style diet may improve heart health. It contains more fat than other heart-healthy diets. But the fats are mainly from nuts, unsaturated oils (such as fish oils and olive oil), and certain nut or seed oils (such as canola, soybean, or flaxseed oil). These fats may help protect the heart and blood vessels.   How can Abnormal; Notable for the following components:    WBC 17.3 (*)     Neutrophil Absolute Prelim 14.02 (*)     Neutrophil Absolute 14.02 (*)     Monocyte Absolute 1.17 (*)     All other components within normal limits   CBC WITH DIFFERENTIAL WITH PLATELET you get started on the Mediterranean diet? Here are some things you can do to switch to a more Mediterranean way of eating. What to eat  · Eat a variety of fruits and vegetables each day, such as grapes, blueberries, tomatoes, broccoli, peppers, figs, olives, spinach, eggplant, beans, lentils, and chickpeas. · Eat a variety of whole-grain foods each day, such as oats, brown rice, and whole wheat bread, pasta, and couscous. · Eat fish at least 2 times a week. Try tuna, salmon, mackerel, lake trout, herring, or sardines. · Eat moderate amounts of low-fat dairy products, such as milk, cheese, or yogurt. · Eat moderate amounts of poultry and eggs. · Choose healthy (unsaturated) fats, such as nuts, olive oil, and certain nut or seed oils like canola, soybean, and flaxseed. · Limit unhealthy (saturated) fats, such as butter, palm oil, and coconut oil. And limit fats found in animal products, such as meat and dairy products made with whole milk. Try to eat red meat only a few times a month in very small amounts. · Limit sweets and desserts to only a few times a week. This includes sugar-sweetened drinks like soda. The Mediterranean diet may also include red wine with your meal1 glass each day for women and up to 2 glasses a day for men. Tips for eating at home  · Use herbs, spices, garlic, lemon zest, and citrus juice instead of salt to add flavor to foods. · Add avocado slices to your sandwich instead of grewal. · Have fish for lunch or dinner instead of red meat. Brush the fish with olive oil, and broil or grill it. · Sprinkle your salad with seeds or nuts instead of cheese. · Cook with olive or canola oil instead of butter or oils that are high in saturated fat. · Switch from 2% milk or whole milk to 1% or fat-free milk. · Dip raw vegetables in a vinaigrette dressing or hummus instead of dips made from mayonnaise or sour cream.  · Have a piece of fruit for dessert instead of a piece of cake.  Try baked apples, or have some dried fruit. Tips for eating out  · Try broiled, grilled, baked, or poached fish instead of having it fried or breaded. · Ask your  to have your meals prepared with olive oil instead of butter. · Order dishes made with marinara sauce or sauces made from olive oil. Avoid sauces made from cream or mayonnaise. · Choose whole-grain breads, whole wheat pasta and pizza crust, brown rice, beans, and lentils. · Cut back on butter or margarine on bread. Instead, you can dip your bread in a small amount of olive oil. · Ask for a side salad or grilled vegetables instead of french fries or chips. Where can you learn more? Go to http://dori-dipak.info/. Enter [phone] in the search box to learn more about \"Learning About the Mediterranean Diet. \"  Current as of: December 29, 2016  Content Version: 11.3  © 5605-4857 [employer]. Care instructions adapted under license by [employer] (which disclaims liability or warranty for this information). If you have questions about a medical condition or this instruction, always ask your healthcare professional. [place] [address] any warranty or liability for your use of this information. Starting a Weight Loss Plan: Care Instructions  Your Care Instructions  If you are thinking about losing weight, it can be hard to know where to start. Your doctor can help you set up a weight loss plan that best meets your needs. You may want to take a class on nutrition or exercise, or join a weight loss support group. If you have questions about how to make changes to your eating or exercise habits, ask your doctor about seeing a registered dietitian or an exercise specialist.  It can be a big challenge to lose weight. But you do not have to make huge changes at once. Make small changes, and stick with them. When those changes become habit, add a few more changes.   If you do not think you are ready to make taking these medications    amoxicillin clavulanate 875-125 MG Oral Tab  Take 1 tablet by mouth 2 (two) times daily for 7 days. , Normal, Disp-14 tablet, R-0    saccharomyces boulardii 250 MG Oral Cap  Take 1 capsule (250 mg total) by mouth 2 (two) times da changes right now, try to pick a date in the future. Make an appointment to see your doctor to discuss whether the time is right for you to start a plan. Follow-up care is a key part of your treatment and safety. Be sure to make and go to all appointments, and call your doctor if you are having problems. Its also a good idea to know your test results and keep a list of the medicines you take. How can you care for yourself at home? · Set realistic goals. Many people expect to lose much more weight than is likely. A weight loss of 5% to 10% of your body weight may be enough to improve your health. · Get family and friends involved to provide support. Talk to them about why you are trying to lose weight, and ask them to help. They can help by participating in exercise and having meals with you, even if they may be eating something different. · Find what works best for you. If you do not have time or do not like to cook, a program that offers meal replacement bars or shakes may be better for you. Or if you like to prepare meals, finding a plan that includes daily menus and recipes may be best.  · Ask your doctor about other health professionals who can help you achieve your weight loss goals. ¨ A dietitian can help you make healthy changes in your diet. ¨ An exercise specialist or  can help you develop a safe and effective exercise program.  ¨ A counselor or psychiatrist can help you cope with issues such as depression, anxiety, or family problems that can make it hard to focus on weight loss. · Consider joining a support group for people who are trying to lose weight. Your doctor can suggest groups in your area. Where can you learn more? Go to http://dori-dipak.info/. Enter V501 in the search box to learn more about \"Starting a Weight Loss Plan: Care Instructions. \"  Current as of: October 13, 2016  Content Version: 11.3  © 7456-9424 [employer].  Care instructions adapted under license by [employer] (which disclaims liability or warranty for this information). If you have questions about a medical condition or this instruction, always ask your healthcare professional. [address] any warranty or liability for your use of this information. Medicare Part B Preventive Services Guidelines/Limitations Date last completed and Frequency Due Date   Bone Mass Measurement  (age 72 & older, biennial) Requires diagnosis related to osteoporosis or estrogen deficiency. Biennial benefit unless patient has history of long-term glucocorticoid tx or baseline is needed because initial test was by other method Completed Recommended every 2 years Not applicable   Cardiovascular Screening Blood Tests (every 5 years)  Total cholesterol, HDL, Triglycerides Order as a panel if possible Completed 6/27/2017  Recommended annually Due 6/27/2018   Colorectal Cancer Screening  -Fecal occult blood test (annual)  -Flexible sigmoidoscopy (5y)  -Screening colonoscopy (10y)  -Barium Enema  Completed 9/20/2012  Recommended every 5 years  Due 9/20/2017   Counseling to Prevent Tobacco Use (up to 8 sessions per year)  - Counseling greater than 3 and up to 10 minutes  - Counseling greater than 10 minutes Patients must be asymptomatic of tobacco-related conditions to receive as preventive service  Not applicable   Diabetes Screening Tests (at least every 3 years, Medicare covers annually or at 6-month intervals for prediabetic patients)    Fasting blood sugar (FBS) or glucose tolerance test (GTT) Patient must be diagnosed with one of the following:  -Hypertension, Dyslipidemia, obesity, previous impaired FBS or GTT  Or any two of the following: overweight, FH of diabetes, age ? 72, history of gestational diabetes, birth of baby weighing more than 9 pounds Completed: 6/27/2017    Recommended every 3 years for non-diabetics    Recommended every 3-6 months for Pre-Diabetics and Diabetics Due 12/27/2017   Diabetes Self-Management Training (DSMT) (no USPSTF recommendation) Requires referral by treating physician for patient with diabetes or renal disease. 10 hours of initial DSMT session of no less than 30 minutes each in a continuous 12-month period. 2 hours of follow-up DSMT in subsequent years. Not applicable   Glaucoma Screening (no USPSTF recommendation) Diabetes mellitus, family history, , age 48 or over,  American, age 72 or over Completed   Recommended annually Follow up as recommended by Dr. Baljinder Fulton (HIV) Screening (annually for increased risk patients)  HIV-1 and HIV-2 by EIA, ADRIAN, rapid antibody test, or oral mucosa transudate Patient must be at increased risk for HIV infection per USPSTF guidelines or pregnant. Tests covered annually for patients at increased risk. Pregnant patients may receive up to 3 test during pregnancy. Not applicable   Medical Nutrition Therapy (MNT) (for diabetes or renal disease not recommended schedule) Requires referral by treating physician for patient with diabetes or renal disease. Can be provided in same year as diabetes self-management training (DSMT), and CMS recommends medical nutrition therapy take place after DSMT. Up to 3 hours for initial year and 2 hours in subsequent years.   Not applicable   Prostate Cancer Screening (annually up to age 76)  - Digital rectal exam (EBENEZER)  - Prostate specific antigen (PSA) Annually (age 48 or over), EBENEZER not paid separately when covered E/M service is provided on same date Completed  Recommended annually to age 76 Not applicable   Seasonal Influenza Vaccination (annually)  Completed   Recommended Annually Due Fall 2017   Pneumococcal Vaccination (once after 72)  Pneumococcal 21 -  Recommended once over the age of 72    Prevnar 15 - Recommended once over the age of 72 Not indicated        Not indicated   Hepatitis B Vaccinations (if medium/high risk) Medium/high risk factors:  End-stage renal disease,  Hemophiliacs who received Factor VIII or IX concentrates, Clients of institutions for the mentally retarded, Persons who live in the same house as a HepB virus carrier, Homosexual men, Illicit injectable drug abusers. Not applicable   Screening Mammography (biennial age 54-69)? Annually (age 36 or over) Completed 5/12/2015   Due now; please call to schedule your appointment   Screening Pap Tests and Pelvic Examination (up to age 79 and after 79 if unknown history or abnormal study last 10 years) Every 24 months except high risk Completed 5/12/2015 Due now please call to schedule appointment   Ultrasound Screening for Abdominal Aortic Aneurysm (AAA) (once) Patient must be referred through [facility] and not have had a screening for abdominal aortic aneurysm before under Medicare. Limited to patients who meet one of the following criteria:  - Men who are 73-68 years old and have smoked more than 100 cigarettes in their lifetime.  -Anyone with a FH of AAA  -Anyone recommended for screening by USPSTF  Not applicable   Family Practice Management 2011    Agree with nurse assessment and plan for Medicare Wellness. Follow-up Disposition:  Return in about 4 months (around 10/27/2017) for F/U GERD, obesity. Advised her to call back or return to office if symptoms worsen/change/persist.  Discussed expected course/resolution/complications of diagnosis in detail with patient. Medication risks/benefits/costs/interactions/alternatives discussed with patient. She was given an after visit summary which includes diagnoses, current medications, & vitals. She expressed understanding with the diagnosis and plan.

## 2022-01-12 ENCOUNTER — OFFICE VISIT (OUTPATIENT)
Dept: ORTHOPEDIC SURGERY | Age: 62
End: 2022-01-12
Payer: MEDICARE

## 2022-01-12 DIAGNOSIS — R26.2 DIFFICULTY WALKING: ICD-10-CM

## 2022-01-12 DIAGNOSIS — Z96.652 S/P TOTAL KNEE ARTHROPLASTY, LEFT: Primary | ICD-10-CM

## 2022-01-12 PROCEDURE — 97140 MANUAL THERAPY 1/> REGIONS: CPT | Performed by: PHYSICAL THERAPIST

## 2022-01-12 PROCEDURE — 97110 THERAPEUTIC EXERCISES: CPT | Performed by: PHYSICAL THERAPIST

## 2022-01-12 NOTE — PROGRESS NOTES
PT DAILY TREATMENT NOTE    Patient Name: Milton Michael  Date:2022  : 1960  [x]  Patient  Verified  Payor: Lino Sigala / Plan: [place] PART [place] & B / Product Type: Medicare /    Total Treatment Time 1:1 (min): 45    Treatment Area: L knee    SUBJECTIVE  Subjective functional status/changes:   [] No changes reported  Patient states that her knee is doing well overall. Patient states that her primary limitation is her balance. OBJECTIVE  Modality (rationale):   []  E-Stim: type _ x _ min     []att   []unatt   []w/US   []w/ice   []w/heat  []  Traction: []cerv   []pelvic   _ lbs x _ min     []pro   []sup   []int   []const  []  Ultrasound: []cont   []pulse    _ W/cm2 x _  min   []1MHz   []3MHz  []  Iontophoresis: []take home patch w/ dexamethazone    []40mA   []80mA                               []_ mA min w/: []dexamethazone   []other:_  []  Ice pack _  min     [] Hot pack _  min     [] Paraffin _  min  []  Other:     Therapeutic Exercise:     PT Exercise Log        Activity/Exercise Date  22    Activity/Exercise      NuStep   X      Slant board  X     TKE   X       Leg press 100 LB  X     Heel slide  X     S AQ with 3 LB  X   Balance board   X   Marching standing   X     Tandem stance  X            (minutes: 30)    Manual Therapy: Treatment consisted of grade 3 patellofemoral mobilizations. Scar mobilization. STM of distal quad and ITB. Manual gastroc stretch. Grade 4 anterior and posterior tibiofemoral mobilizations. Left knee range of motion 0 to 115 degrees. (minutes: 15 )    ASSESSMENT  []  See Plan of Care  []  See progress note/recertification  [x]  Patient will continue to benefit from skilled therapy to address remaining functional deficits: knee extension strength. Patient has been making good progress with range of motion and functional mobility. She ambulated with use of SPC and demonstrated nonantalgic gait pattern with equal step lengths.       ICD-10-CM ICD-9-CM 1. S/P total knee arthroplasty, left  Z96.652 V43.65    2. Difficulty walking  R26.2 719.7      Progress towards goals / Updated goals:    PLAN  []  Upgrade activities as tolerated     [x]  Continue plan of care  []  Discharge due to:_  [] Other:_      Focus on progressing quad strengthening and balance interventions to progress toward long-term goals.     Tegan Lagunas, PT 1/12/2022  3:40 PM

## 2022-01-18 ENCOUNTER — OFFICE VISIT (OUTPATIENT)
Dept: ORTHOPEDIC SURGERY | Age: 62
End: 2022-01-18

## 2022-01-18 ENCOUNTER — OFFICE VISIT (OUTPATIENT)
Dept: ORTHOPEDIC SURGERY | Age: 62
End: 2022-01-18
Payer: MEDICARE

## 2022-01-18 VITALS — BODY MASS INDEX: 48.4 KG/M2 | HEIGHT: 62 IN | WEIGHT: 263 LBS

## 2022-01-18 DIAGNOSIS — M17.11 PRIMARY OSTEOARTHRITIS OF RIGHT KNEE: ICD-10-CM

## 2022-01-18 DIAGNOSIS — Z96.652 S/P TOTAL KNEE ARTHROPLASTY, LEFT: Primary | ICD-10-CM

## 2022-01-18 DIAGNOSIS — Z96.652 STATUS POST LEFT KNEE REPLACEMENT: Primary | ICD-10-CM

## 2022-01-18 DIAGNOSIS — R26.2 DIFFICULTY WALKING: ICD-10-CM

## 2022-01-18 PROCEDURE — G9717 DOC PT DX DEP/BP F/U NT REQ: HCPCS | Performed by: ORTHOPAEDIC SURGERY

## 2022-01-18 PROCEDURE — 97110 THERAPEUTIC EXERCISES: CPT | Performed by: PHYSICAL THERAPIST

## 2022-01-18 PROCEDURE — G9899 SCRN MAM PERF RSLTS DOC: HCPCS | Performed by: ORTHOPAEDIC SURGERY

## 2022-01-18 PROCEDURE — G8427 DOCREV CUR MEDS BY ELIG CLIN: HCPCS | Performed by: ORTHOPAEDIC SURGERY

## 2022-01-18 PROCEDURE — G8417 CALC BMI ABV UP PARAM F/U: HCPCS | Performed by: ORTHOPAEDIC SURGERY

## 2022-01-18 PROCEDURE — 97140 MANUAL THERAPY 1/> REGIONS: CPT | Performed by: PHYSICAL THERAPIST

## 2022-01-18 PROCEDURE — 99024 POSTOP FOLLOW-UP VISIT: CPT | Performed by: ORTHOPAEDIC SURGERY

## 2022-01-18 PROCEDURE — 3017F COLORECTAL CA SCREEN DOC REV: CPT | Performed by: ORTHOPAEDIC SURGERY

## 2022-01-18 NOTE — PROGRESS NOTES
Kaci King (: 1960) is a 57 y.o. female, patient, here for evaluation of the following chief complaint(s):  Knee Surgery (left total knee 10/2021)       HPI:    Status post left total knee. Patient is doing well with this. She wanted me to take a look at her right knee. On her last set of x-rays her right knee showed severe end-stage DJD. Allergies   Allergen Reactions    Flagyl [Metronidazole] Other (comments)     HEADACHES AND MENTAL DISTRESS       Current Outpatient Medications   Medication Sig    warfarin (COUMADIN) 6 mg tablet Take 6 mg by mouth daily.  senna-docusate (PERICOLACE) 8.6-50 mg per tablet Take 1 Tablet by mouth two (2) times a day. Indications: constipation, opioid induced constipation    warfarin (COUMADIN) 2 mg tablet Take 2 and 1/2 tabs daily or as directed by physician based on weekly PT/INR values. INR goal 1.7 - 2.2  Indications: deep vein thrombosis prevention (Patient not taking: Reported on 2021)    nystatin (MYCOSTATIN) powder Apply  to affected area three (3) times daily. (Patient not taking: Reported on 2021)    levothyroxine (SYNTHROID) 25 mcg tablet Take 1 Tablet by mouth nightly.  cholecalciferol, vitamin D3, (VITAMIN D3) 2,000 unit tab Take 4,000 Units by mouth nightly.  acetaminophen (TYLENOL) 325 mg tablet Take 650 mg by mouth every four (4) hours as needed for Pain.  venlafaxine (EFFEXOR) 75 mg tablet Take 75 mg by mouth daily. PT TAKES A TOTAL  mg EVERY MORNING    venlafaxine-SR (EFFEXOR XR) 150 mg capsule Take 150 mg by mouth daily. PT TAKES A TOTAL OF 225mg EVERY MORNING    risperiDONE (RISPERDAL) 3 mg tablet Take 3 mg by mouth nightly.  cpap machine kit by Does Not Apply route. Dx 327.23    TRAZODONE HCL (TRAZODONE PO) Take 50 mg by mouth nightly as needed.  lamoTRIgine (LAMICTAL) 100 mg tablet Take 200 mg by mouth nightly. No current facility-administered medications for this visit.        Past Medical History:   Diagnosis Date    Anxiety and depression     [facility]    Bipolar Disorder     [facility]    Chronic pain     LEFT KNEE    DDD (degenerative disc disease), lumbosacral     L5-S1 ([facility])    Depression     Fatty liver 2018    Foot fracture, right     h/o    Genital herpes     GERD (gastroesophageal reflux disease)     History of gastritis     Dr. Vanessa Pederson Hyperlipidemia     Hypothyroidism (acquired)     Menopause     LMP- 51years old?  OA (osteoarthritis)     knees, Dr. Kenneth Hernandez    Obesity     NIKOLE on CPAP     adherent with CPAP use    Symptomatic cholelithiasis 10/18/2017        Past Surgical History:   Procedure Laterality Date    HX COLONOSCOPY  12    colon polyp, repeat in 5 yrs, Dr. Mark Garrido HX ENDOSCOPY  16    gastritis, hiatal hernia (Dr. Simran Jones)    HX LAP CHOLECYSTECTOMY  10/18/2018    Laparoscopic cholecystectomy by Dr. Jefe Kim.     HX OTHER SURGICAL      cyst removed from scalp - benign       Family History   Problem Relation Age of Onset    Heart Disease Mother     Pulmonary Embolism Mother     Lung Disease Father     Diabetes Father     Heart Disease Father     COPD Father     Seizures Brother     Cancer Maternal Grandmother         OVARIAN, COLON    Diabetes Paternal Uncle     Diabetes Paternal Grandfather     Colon Cancer Other         materal grandmothers siblings x 3-4  of colon cancer    Celiac Disease Brother     Heart Disease Maternal Grandfather     Other Sister         PERIPHERAL ARTERY DISEASE    No Known Problems Sister     Anesth Problems Neg Hx         Social History     Socioeconomic History    Marital status: SINGLE     Spouse name: Not on file    Number of children: Not on file    Years of education: Not on file    Highest education level: Not on file   Occupational History    Not on file   Tobacco Use    Smoking status: Never Smoker    Smokeless tobacco: Never Used   Vaping Use    Vaping Use: Never used   Substance and Sexual Activity    Alcohol use: No     Alcohol/week: 0.0 standard drinks    Drug use: No     Comment: prescriptions    Sexual activity: Never   Other Topics Concern    Not on file   Social History Narrative    Not on file     Social Determinants of Health     Financial Resource Strain:     Difficulty of Paying Living Expenses: Not on file   Food Insecurity:     Worried About Running Out of Food in the Last Year: Not on file    Tanvir of Food in the Last Year: Not on file   Transportation Needs:     Lack of Transportation (Medical): Not on file    Lack of Transportation (Non-Medical): Not on file   Physical Activity:     Days of Exercise per Week: Not on file    Minutes of Exercise per Session: Not on file   Stress:     Feeling of Stress : Not on file   Social Connections:     Frequency of Communication with Friends and Family: Not on file    Frequency of Social Gatherings with Friends and Family: Not on file    Attends Sikh Services: Not on file    Active Member of [address] [place] [address] or Organizations: Not on file    Attends Club or Organization Meetings: Not on file    Marital Status: Not on file   Intimate Partner Violence:     Fear of Current or Ex-Partner: Not on file    Emotionally Abused: Not on file    Physically Abused: Not on file    Sexually Abused: Not on file   Housing Stability:     Unable to Pay for Housing in the Last Year: Not on file    Number of [place] in the Last Year: Not on file    Unstable Housing in the Last Year: Not on file             Vitals:  Ht 5' 2\" (1.575 m)   Wt 263 lb (119.3 kg)   BMI 48.10 kg/m²    Body mass index is 48.1 kg/m². PHYSICAL EXAM:  Right lower extremity exam shows varus malalignment with significant pain along the medial joint line. Palpable medial osteophytes are present. Ranges 3 to 120 degrees.     Left knee exam shows a well-healed knee incision with range of motion 0 to 100 degrees. IMAGING:  None    ASSESSMENT/PLAN:  1. Status post left knee replacement  2. Primary osteoarthritis of right knee    Patient can call to schedule a right knee replacement at her discretion. She has end-stage DJD. Left knee is doing well. Follow-up 9 months for this. An electronic signature was used to authenticate this note.   --Te Louis MD

## 2022-01-18 NOTE — PROGRESS NOTES
PT DAILY TREATMENT NOTE    Patient Name: Torrey Mason  Date:2022  : 1960  [x]  Patient  Verified  Payor: Bryant Braga / Plan: [place] MEDICARE PART A & B / Product Type: Medicare /    Total Treatment Time 1:1 (min): 45    Treatment Area: L knee    SUBJECTIVE  Subjective functional status/changes:   [] No changes reported  Patient states that she has been doing well with no functional limitations. She does have occasional shin pain when she straightens her foot. OBJECTIVE  Modality (rationale):   []  E-Stim: type _ x _ min     []att   []unatt   []w/US   []w/ice   []w/heat  []  Traction: []cerv   []pelvic   _ lbs x _ min     []pro   []sup   []int   []const  []  Ultrasound: []cont   []pulse    _ W/cm2 x _  min   []1MHz   []3MHz  []  Iontophoresis: []take home patch w/ dexamethazone    []40mA   []80mA                               []_ mA min w/: []dexamethazone   []other:_  []  Ice pack _  min     [] Hot pack _  min     [] Paraffin _  min  []  Other:     Therapeutic Exercise:     PT Exercise Log        Activity/Exercise Date  22    Activity/Exercise      NuStep   X      Slant board  X     TKE   X       Leg press 100 LB  X     Heel slide  X     S AQ with 3 LB  X   Balance board   X   Marching standing   X     Tandem stance  X            (minutes: 30)    Manual Therapy: Treatment consisted of grade 3 patellofemoral mobilizations. Scar mobilization. STM of distal quad and ITB. Manual gastroc stretch. Grade 4 anterior and posterior tibiofemoral mobilizations. Left knee range of motion 0 to 115 degrees. (minutes: 15 )    ASSESSMENT  []  See Plan of Care  []  See progress note/recertification  [x]  Patient will continue to benefit from skilled therapy to address remaining functional deficits: knee extension strength. Patient has achieved all functional goals. She is now able to ambulate without use of AD. Left knee range of motion 0 to 115 degrees. Left knee extension strength 5/5. Patient recommended to continue HEP independently at this time. ICD-10-CM ICD-9-CM    1. S/P total knee arthroplasty, left  Z96.652 V43.65    2. Difficulty walking  R26.2 719.7      Progress towards goals / Updated goals:    PLAN  []  Upgrade activities as tolerated     []  Continue plan of care  [x]  Discharge due to:_  [] Other:_      Patient has achieved all functional goals.     Craig Lundborg, PT 1/18/2022  3:40 PM

## 2022-03-02 ENCOUNTER — HOSPITAL ENCOUNTER (OUTPATIENT)
Dept: MAMMOGRAPHY | Age: 62
Discharge: HOME | End: 2022-03-02
Payer: MEDICARE

## 2022-03-02 DIAGNOSIS — Z12.31 SCREENING MAMMOGRAM FOR BREAST CANCER: ICD-10-CM

## 2022-03-02 PROCEDURE — 77063 BREAST TOMOSYNTHESIS BI: CPT

## 2022-03-16 ENCOUNTER — TELEPHONE (OUTPATIENT)
Dept: INTERNAL MEDICINE CLINIC | Age: 62
End: 2022-03-16

## 2022-03-16 ENCOUNTER — OFFICE VISIT (OUTPATIENT)
Dept: INTERNAL MEDICINE CLINIC | Age: 62
End: 2022-03-16
Payer: MEDICARE

## 2022-03-16 VITALS
HEIGHT: 62 IN | WEIGHT: 256.8 LBS | BODY MASS INDEX: 47.26 KG/M2 | RESPIRATION RATE: 16 BRPM | OXYGEN SATURATION: 97 % | DIASTOLIC BLOOD PRESSURE: 80 MMHG | TEMPERATURE: 97.1 F | SYSTOLIC BLOOD PRESSURE: 110 MMHG | HEART RATE: 96 BPM

## 2022-03-16 DIAGNOSIS — Z96.659 PAIN DUE TO KNEE JOINT PROSTHESIS, INITIAL ENCOUNTER (HCC): ICD-10-CM

## 2022-03-16 DIAGNOSIS — T84.84XA PAIN DUE TO KNEE JOINT PROSTHESIS, INITIAL ENCOUNTER (HCC): Primary | ICD-10-CM

## 2022-03-16 DIAGNOSIS — E66.01 MORBID OBESITY WITH BMI OF 45.0-49.9, ADULT (HCC): ICD-10-CM

## 2022-03-16 DIAGNOSIS — T84.84XA PAIN DUE TO KNEE JOINT PROSTHESIS, INITIAL ENCOUNTER (HCC): ICD-10-CM

## 2022-03-16 DIAGNOSIS — Z96.659 PAIN DUE TO KNEE JOINT PROSTHESIS, INITIAL ENCOUNTER (HCC): Primary | ICD-10-CM

## 2022-03-16 LAB
ALBUMIN SERPL-MCNC: 3.7 G/DL (ref 3.5–5)
ALBUMIN/GLOB SERPL: 1.2 {RATIO} (ref 1.1–2.2)
ALP SERPL-CCNC: 129 U/L (ref 45–117)
ALT SERPL-CCNC: 43 U/L (ref 12–78)
ANION GAP SERPL CALC-SCNC: 3 MMOL/L (ref 5–15)
AST SERPL-CCNC: 14 U/L (ref 15–37)
BASOPHILS # BLD: 0 K/UL (ref 0–0.1)
BASOPHILS NFR BLD: 0 % (ref 0–1)
BILIRUB SERPL-MCNC: 0.1 MG/DL (ref 0.2–1)
BUN SERPL-MCNC: 20 MG/DL (ref 6–20)
BUN/CREAT SERPL: 20 (ref 12–20)
CALCIUM SERPL-MCNC: 8.6 MG/DL (ref 8.5–10.1)
CHLORIDE SERPL-SCNC: 105 MMOL/L (ref 97–108)
CO2 SERPL-SCNC: 28 MMOL/L (ref 21–32)
CREAT SERPL-MCNC: 0.98 MG/DL (ref 0.55–1.02)
CRP SERPL-MCNC: 0.94 MG/DL (ref 0–0.6)
DIFFERENTIAL METHOD BLD: NORMAL
EOSINOPHIL # BLD: 0.1 K/UL (ref 0–0.4)
EOSINOPHIL NFR BLD: 2 % (ref 0–7)
ERYTHROCYTE [DISTWIDTH] IN BLOOD BY AUTOMATED COUNT: 13.5 % (ref 11.5–14.5)
ERYTHROCYTE [SEDIMENTATION RATE] IN BLOOD: 30 MM/HR (ref 0–30)
GLOBULIN SER CALC-MCNC: 3.1 G/DL (ref 2–4)
GLUCOSE SERPL-MCNC: 109 MG/DL (ref 65–100)
HCT VFR BLD AUTO: 41.2 % (ref 35–47)
HGB BLD-MCNC: 13.1 G/DL (ref 11.5–16)
IMM GRANULOCYTES # BLD AUTO: 0 K/UL (ref 0–0.04)
IMM GRANULOCYTES NFR BLD AUTO: 0 % (ref 0–0.5)
LYMPHOCYTES # BLD: 1.8 K/UL (ref 0.8–3.5)
LYMPHOCYTES NFR BLD: 25 % (ref 12–49)
MCH RBC QN AUTO: 29 PG (ref 26–34)
MCHC RBC AUTO-ENTMCNC: 31.8 G/DL (ref 30–36.5)
MCV RBC AUTO: 91.4 FL (ref 80–99)
MONOCYTES # BLD: 0.6 K/UL (ref 0–1)
MONOCYTES NFR BLD: 8 % (ref 5–13)
NEUTS SEG # BLD: 4.6 K/UL (ref 1.8–8)
NEUTS SEG NFR BLD: 65 % (ref 32–75)
NRBC # BLD: 0 K/UL (ref 0–0.01)
NRBC BLD-RTO: 0 PER 100 WBC
PLATELET # BLD AUTO: 218 K/UL (ref 150–400)
PMV BLD AUTO: 11.2 FL (ref 8.9–12.9)
POTASSIUM SERPL-SCNC: 4.3 MMOL/L (ref 3.5–5.1)
PROT SERPL-MCNC: 6.8 G/DL (ref 6.4–8.2)
RBC # BLD AUTO: 4.51 M/UL (ref 3.8–5.2)
SODIUM SERPL-SCNC: 136 MMOL/L (ref 136–145)
WBC # BLD AUTO: 7.1 K/UL (ref 3.6–11)

## 2022-03-16 PROCEDURE — G9899 SCRN MAM PERF RSLTS DOC: HCPCS | Performed by: INTERNAL MEDICINE

## 2022-03-16 PROCEDURE — G8427 DOCREV CUR MEDS BY ELIG CLIN: HCPCS | Performed by: INTERNAL MEDICINE

## 2022-03-16 PROCEDURE — G9717 DOC PT DX DEP/BP F/U NT REQ: HCPCS | Performed by: INTERNAL MEDICINE

## 2022-03-16 PROCEDURE — 3017F COLORECTAL CA SCREEN DOC REV: CPT | Performed by: INTERNAL MEDICINE

## 2022-03-16 PROCEDURE — 99213 OFFICE O/P EST LOW 20 MIN: CPT | Performed by: INTERNAL MEDICINE

## 2022-03-16 PROCEDURE — G0463 HOSPITAL OUTPT CLINIC VISIT: HCPCS | Performed by: INTERNAL MEDICINE

## 2022-03-16 PROCEDURE — G8417 CALC BMI ABV UP PARAM F/U: HCPCS | Performed by: INTERNAL MEDICINE

## 2022-03-16 RX ORDER — AMOXICILLIN 500 MG/1
500 CAPSULE ORAL ONCE
Qty: 1 CAPSULE | Refills: 0 | Status: SHIPPED | OUTPATIENT
Start: 2022-03-16 | End: 2022-03-16

## 2022-03-16 RX ORDER — AMOXICILLIN 500 MG/1
500 CAPSULE ORAL ONCE
Qty: 1 CAPSULE | Refills: 0 | Status: SHIPPED | OUTPATIENT
Start: 2022-03-16 | End: 2022-03-16 | Stop reason: SDUPTHER

## 2022-03-16 NOTE — Clinical Note
I saw this patient today--she has bad gingivitis. Her left knee is tender and appears to have at least a minor effusion. Labs are ordered and pending. I told her to reach out to your office.      Thanks,   Brii Estrada

## 2022-03-16 NOTE — PROGRESS NOTES
Chief Complaint   Patient presents with    Medication Refill    Other     gums sore      Reviewed record in preparation for visit and have obtained necessary documentation. Identified pt with two pt identifiers(name and ).       Health Maintenance Due   Topic    Cervical cancer screen     Shingrix Vaccine Age 49> (1 of 2)    Colorectal Cancer Screening Combo     Medicare Yearly Exam     COVID-19 Vaccine (3 - Booster for Moderna series)         Chief Complaint   Patient presents with    Medication Refill    Other     gums sore         Wt Readings from Last 3 Encounters:   22 256 lb 12.8 oz (116.5 kg)   22 263 lb (119.3 kg)   21 263 lb (119.3 kg)     Temp Readings from Last 3 Encounters:   22 97.1 °F (36.2 °C)   10/28/21 98.5 °F (36.9 °C)   10/21/21 98 °F (36.7 °C)     BP Readings from Last 3 Encounters:   22 110/80   10/28/21 118/71   10/21/21 119/71     Pulse Readings from Last 3 Encounters:   22 96   10/28/21 97   10/21/21 76           Learning Assessment:  :     Learning Assessment 10/18/2017 2014   PRIMARY LEARNER Patient Patient   HIGHEST LEVEL OF EDUCATION - PRIMARY LEARNER  > 4 YEARS OF COLLEGE > 4 YEARS OF COLLEGE   BARRIERS PRIMARY LEARNER NONE OTHER   CO-LEARNER CAREGIVER Yes No   CO-LEARNER NAME mark Winston -   CO-LEARNER HIGHEST LEVEL OF EDUCATION 4 YEARS OF COLLEGE -   1 Paramjit Hernandez -   PRIMARY LANGUAGE ENGLISH ENGLISH   PRIMARY LANGUAGE CO-LEARNER ENGLISH -    NEED No No   LEARNER PREFERENCE PRIMARY DEMONSTRATION DEMONSTRATION     - LISTENING   LEARNING SPECIAL TOPICS - no   ANSWERED BY self patient   RELATIONSHIP SELF SELF   ASSESSMENT COMMENT none n/a       Depression Screening:  :     3 most recent PHQ Screens 3/16/2022   Little interest or pleasure in doing things Several days   Feeling down, depressed, irritable, or hopeless Several days   Total Score PHQ 2 2   Trouble falling or staying asleep, or sleeping too much -   Feeling tired or having little energy -   Poor appetite, weight loss, or overeating -   Feeling bad about yourself - or that you are a failure or have let yourself or your family down -   Trouble concentrating on things such as school, work, reading, or watching TV -   Moving or speaking so slowly that other people could have noticed; or the opposite being so fidgety that others notice -   Thoughts of being better off dead, or hurting yourself in some way -   PHQ 9 Score -   How difficult have these problems made it for you to do your work, take care of your home and get along with others -       Fall Risk Assessment:  :     Fall Risk Assessment, last 12 mths 5/29/2019   Able to walk? Yes   Fall in past 12 months? No       Abuse Screening:  :     Abuse Screening Questionnaire 3/16/2022 10/4/2021 6/27/2017 8/6/2014   Do you ever feel afraid of your partner? N N N N   Are you in a relationship with someone who physically or mentally threatens you? N N N N   Is it safe for you to go home? Y Y Y Y       Coordination of Care Questionnaire:  :     1) Have you been to an emergency room, urgent care clinic since your last visit? no   Hospitalized since your last visit? yes             2) Have you seen or consulted any other health care providers outside of [address] [place] [address] since your last visit? yes  (Include any pap smears or colon screenings in this section.)    3) Do you have an Advance Directive on file? no    4) Are you interested in receiving information on Advance Directives? NO      Patient is accompanied by self I have received verbal consent from Nena Capellan to discuss any/all medical information while they are present in the room. Reviewed record  In preparation for visit and have obtained necessary documentation.

## 2022-03-16 NOTE — PROGRESS NOTES
Acute Care Note    Sweetie Driscoll is 57 y.o. female. she presents for evaluation of Medication Refill and Other (gums sore )      The patient reports having sore gums that have been present off and on for the past few weeks. She sees a dentist through [address] [place] [address] but has not been able to get an appointment. She also has had a left TKA this past fall in Oct and given her current gingival issues, she is concerned about her knee. She recently developed some pain at the knee. She has not noted any overlying redness but does note that is has recently become sore. She is concerned about a developing infection in the joint prosthesis. Prior to Admission medications    Medication Sig Start Date End Date Taking? Authorizing Provider   levothyroxine (SYNTHROID) 25 mcg tablet Take 1 Tablet by mouth nightly. 9/9/21  Yes Sasha Tellez MD   cholecalciferol, vitamin D3, (VITAMIN D3) 2,000 unit tab Take 4,000 Units by mouth nightly. Yes Provider, Historical   venlafaxine (EFFEXOR) 75 mg tablet Take 75 mg by mouth daily. PT TAKES A TOTAL  mg EVERY MORNING   Yes Provider, Historical   venlafaxine-SR (EFFEXOR XR) 150 mg capsule Take 150 mg by mouth daily. PT TAKES A TOTAL OF 225mg EVERY MORNING   Yes Provider, Historical   risperiDONE (RISPERDAL) 3 mg tablet Take 3 mg by mouth nightly. Yes Provider, Historical   cpap machine kit by Does Not Apply route. Dx 327.23 7/13/15  Yes Huey Jaime PA   TRAZODONE HCL (TRAZODONE PO) Take 50 mg by mouth nightly as needed. Yes Provider, Historical   lamoTRIgine (LAMICTAL) 100 mg tablet Take 200 mg by mouth nightly. Yes Provider, Historical   warfarin (COUMADIN) 6 mg tablet Take 6 mg by mouth daily. Provider, Historical   senna-docusate (PERICOLACE) 8.6-50 mg per tablet Take 1 Tablet by mouth two (2) times a day.  Indications: constipation, opioid induced constipation 10/27/21   Jefe Mojica PA-C   warfarin (COUMADIN) 2 mg tablet Take 2 and 1/2 tabs daily or as directed by physician based on weekly PT/INR values. INR goal 1.7 - 2.2  Indications: deep vein thrombosis prevention  Patient not taking: Reported on 11/17/2021 10/27/21   Jefe Mojica PA-C   nystatin (MYCOSTATIN) powder Apply  to affected area three (3) times daily. Patient not taking: Reported on 11/17/2021 10/4/21   Deidre Ceron MD   acetaminophen (TYLENOL) 325 mg tablet Take 650 mg by mouth every four (4) hours as needed for Pain. Patient not taking: Reported on 3/16/2022    Provider, Historical         Patient Active Problem List   Diagnosis Code    NIKOLE (obstructive sleep apnea) G47.33    Bipolar I disorder, most recent episode depressed, severe without psychotic features ([address] F31.4    Pure hypercholesterolemia E78.00    Hypothyroidism E03.9    Vitamin D deficiency E55.9    GERD (gastroesophageal reflux disease) K21.9    OA (osteoarthritis) M19.90    DDD (degenerative disc disease), lumbosacral M51.37    Symptomatic cholelithiasis K80.20    Morbid obesity ([address] E66.01    Localized osteoarthritis of both knees M17.0    Arthritis of right knee M17.11    Weight above 97th percentile Z78.9    Pain and swelling of right shoulder M25.511, M25.411    Bilateral chronic knee pain M25.561, M25.562, G89.29    Incomplete tear of right rotator cuff M75.111    Bilateral knee effusions M25.461, M25.462    Left foot pain M79.672    Bilateral sciatica M54.31, M54.32    Hip pain M25.559         Review of Systems   Constitutional: Negative for chills and fever. Gastrointestinal: Negative for nausea. Musculoskeletal: Positive for joint pain. Visit Vitals  /80 (BP 1 Location: Left arm, BP Patient Position: Sitting, BP Cuff Size: Adult)   Pulse 96   Temp 97.1 °F (36.2 °C)   Resp 16   Ht 5' 2\" (1.575 m)   Wt 256 lb 12.8 oz (116.5 kg)   SpO2 97%   BMI 46.97 kg/m²       Physical Exam  Constitutional:       Appearance: Normal appearance.    Cardiovascular:      Pulses: Normal pulses. Heart sounds: Normal heart sounds. Musculoskeletal:         General: Swelling (mild effusion at the right knee) and tenderness present. Neurological:      Mental Status: She is alert. ASSESSMENT/PLAN  Diagnoses and all orders for this visit:    1. Pain due to knee joint prosthesis, initial encounter ([facility] - Advised the patient follow up with orthopedics given pain in the knee and her dental issues. I will order a prophylactic antibiotic for any dental procedure. She will call ortho today. The concern would be prosthetic infection.   -     CBC WITH AUTOMATED DIFF; Future  -     SED RATE (ESR); Future  -     C REACTIVE PROTEIN, QT; Future  -     METABOLIC PANEL, COMPREHENSIVE; Future  -     amoxicillin (AMOXIL) 500 mg capsule; Take 1 Capsule by mouth once for 1 dose. One hour prior to dental procedure    2. Morbid obesity with BMI of 45.0-49.9, adult ([facility]         Advised the patient to call back or return to office if symptoms worsen/change/persist.   Discussed expected course/resolution/complications of diagnosis in detail with patient. Medication risks/benefits/costs/interactions/alternatives discussed with patient. The patient was given an after visit summary which includes diagnoses, current medications, & vitals. They expressed understanding with the diagnosis and plan.

## 2022-03-16 NOTE — TELEPHONE ENCOUNTER
----- Message from Jamison De La O sent at 3/16/2022  1:01 PM EDT -----  Subject: Message to Provider    QUESTIONS  Information for Provider? Rep called to confirm instructions for   amoxicillin (AMOXIL) 500 mg capsule prescription received.  ---------------------------------------------------------------------------  --------------  CALL BACK INFO  What is the best way for the office to contact you? OK to leave message on   voicemail  Preferred Call Back Phone Number? [phone]  ---------------------------------------------------------------------------  --------------  SCRIPT ANSWERS  Relationship to Patient? Third Party  Representative Name?  Demaris - [employer]

## 2022-03-17 ENCOUNTER — TELEPHONE (OUTPATIENT)
Dept: INTERNAL MEDICINE CLINIC | Age: 62
End: 2022-03-17

## 2022-03-17 NOTE — TELEPHONE ENCOUNTER
Reason for call:  Lab results    Is this a new problem: yes     Date of last appointment:  3/16/2022     Can we respond via [employer]: no    Best call back number:    Hernan Hannon (Self) [phone] (H)

## 2022-03-18 PROBLEM — M17.11 ARTHRITIS OF RIGHT KNEE: Status: ACTIVE | Noted: 2021-11-05

## 2022-03-18 PROBLEM — Z78.9 WEIGHT ABOVE 97TH PERCENTILE: Status: ACTIVE | Noted: 2021-11-05

## 2022-03-18 PROBLEM — M79.672 LEFT FOOT PAIN: Status: ACTIVE | Noted: 2021-11-05

## 2022-03-18 PROBLEM — M25.559 HIP PAIN: Status: ACTIVE | Noted: 2021-11-05

## 2022-03-18 PROBLEM — M25.511 PAIN AND SWELLING OF RIGHT SHOULDER: Status: ACTIVE | Noted: 2021-11-05

## 2022-03-18 PROBLEM — M54.32 BILATERAL SCIATICA: Status: ACTIVE | Noted: 2021-11-05

## 2022-03-18 PROBLEM — M25.411 PAIN AND SWELLING OF RIGHT SHOULDER: Status: ACTIVE | Noted: 2021-11-05

## 2022-03-18 PROBLEM — M54.31 BILATERAL SCIATICA: Status: ACTIVE | Noted: 2021-11-05

## 2022-03-18 NOTE — PROGRESS NOTES
Pls inform the patient that her labs are significant for a moderate elevation in CRP. Her ESR is normal as are her WBC's. She will need to keep ortho evaluation on 3/24. If the pain worsens or if there is fever, she should go to the ED and not wait for her in office appointment.

## 2022-03-18 NOTE — PROGRESS NOTES
Attempted to contact patient to discuss lab results. Received voicemail. Left message to return a call.

## 2022-03-19 PROBLEM — M17.0 LOCALIZED OSTEOARTHRITIS OF BOTH KNEES: Status: ACTIVE | Noted: 2021-10-25

## 2022-03-19 PROBLEM — M25.462 BILATERAL KNEE EFFUSIONS: Status: ACTIVE | Noted: 2021-11-05

## 2022-03-19 PROBLEM — E66.01 MORBID OBESITY (HCC): Status: ACTIVE | Noted: 2018-04-19

## 2022-03-19 PROBLEM — M75.111 INCOMPLETE TEAR OF RIGHT ROTATOR CUFF: Status: ACTIVE | Noted: 2021-11-05

## 2022-03-19 PROBLEM — M25.461 BILATERAL KNEE EFFUSIONS: Status: ACTIVE | Noted: 2021-11-05

## 2022-03-19 PROBLEM — K80.20 SYMPTOMATIC CHOLELITHIASIS: Status: ACTIVE | Noted: 2017-10-18

## 2022-03-20 PROBLEM — G89.29 BILATERAL CHRONIC KNEE PAIN: Status: ACTIVE | Noted: 2021-11-05

## 2022-03-20 PROBLEM — M25.562 BILATERAL CHRONIC KNEE PAIN: Status: ACTIVE | Noted: 2021-11-05

## 2022-03-20 PROBLEM — M25.561 BILATERAL CHRONIC KNEE PAIN: Status: ACTIVE | Noted: 2021-11-05

## 2022-03-24 ENCOUNTER — OFFICE VISIT (OUTPATIENT)
Dept: ORTHOPEDIC SURGERY | Age: 62
End: 2022-03-24
Payer: MEDICARE

## 2022-03-24 VITALS — WEIGHT: 257 LBS | BODY MASS INDEX: 47.29 KG/M2 | HEIGHT: 62 IN

## 2022-03-24 DIAGNOSIS — M17.11 PRIMARY OSTEOARTHRITIS OF RIGHT KNEE: ICD-10-CM

## 2022-03-24 DIAGNOSIS — R26.89 BALANCE DISORDER: ICD-10-CM

## 2022-03-24 DIAGNOSIS — M25.562 ACUTE PAIN OF LEFT KNEE: ICD-10-CM

## 2022-03-24 DIAGNOSIS — Z96.652 STATUS POST TOTAL LEFT KNEE REPLACEMENT: Primary | ICD-10-CM

## 2022-03-24 PROCEDURE — 3017F COLORECTAL CA SCREEN DOC REV: CPT | Performed by: PHYSICIAN ASSISTANT

## 2022-03-24 PROCEDURE — G9717 DOC PT DX DEP/BP F/U NT REQ: HCPCS | Performed by: PHYSICIAN ASSISTANT

## 2022-03-24 PROCEDURE — G9899 SCRN MAM PERF RSLTS DOC: HCPCS | Performed by: PHYSICIAN ASSISTANT

## 2022-03-24 PROCEDURE — G8427 DOCREV CUR MEDS BY ELIG CLIN: HCPCS | Performed by: PHYSICIAN ASSISTANT

## 2022-03-24 PROCEDURE — 99214 OFFICE O/P EST MOD 30 MIN: CPT | Performed by: PHYSICIAN ASSISTANT

## 2022-03-24 PROCEDURE — G8417 CALC BMI ABV UP PARAM F/U: HCPCS | Performed by: PHYSICIAN ASSISTANT

## 2022-03-24 NOTE — PROGRESS NOTES
Kaci King (: 1960) is a 57 y.o. female, established patient, here for evaluation of the following chief complaint(s):  Knee Pain         SUBJECTIVE/OBJECTIVE:    Kaci King (: 1960) is a 57 y.o. female. Left Total Knee Arthroplasty - Left 10/25/2021     Patient presents for evaluation of left knee pain. Patient underwent left total knee arthroplasty 10/25/2021. Patient states knee pain is generally improved from preoperative status however, she continues have pain in the knee is fully flexed and with lateral motion. Patient states that at times the knee does not feel stable and feels like it may give away. Patient states she has not suffered any falls as a result of knee instability. Patient admits she has not been doing her home exercises. Patient also has end-stage degenerative changes of the right knee and is experiencing right knee pain. Patient also states that she has an issue with her left foot and ankle which also needs to be addressed. Patient states she does not take any pain medication on a routine basis and rates her average daily pain level as a 4/10. Patient rates her preoperative pain level in the left knee prior to surgery is 8-9/10. Patient denies lower extremity numbness or tingling. Patient states she does have an issue with poor balance. Allergies   Allergen Reactions    Flagyl [Metronidazole] Other (comments)     HEADACHES AND MENTAL DISTRESS       Current Outpatient Medications   Medication Sig    levothyroxine (SYNTHROID) 25 mcg tablet Take 1 Tablet by mouth nightly.  cholecalciferol, vitamin D3, (VITAMIN D3) 2,000 unit tab Take 4,000 Units by mouth nightly.  venlafaxine (EFFEXOR) 75 mg tablet Take 75 mg by mouth daily. PT TAKES A TOTAL  mg EVERY MORNING    venlafaxine-SR (EFFEXOR XR) 150 mg capsule Take 150 mg by mouth daily.  PT TAKES A TOTAL OF 225mg EVERY MORNING    risperiDONE (RISPERDAL) 3 mg tablet Take 3 mg by mouth nightly.  cpap machine kit by Does Not Apply route. Dx 327.23    TRAZODONE HCL (TRAZODONE PO) Take 50 mg by mouth nightly as needed.  lamoTRIgine (LAMICTAL) 100 mg tablet Take 200 mg by mouth nightly. No current facility-administered medications for this visit. Social History     Socioeconomic History    Marital status: SINGLE     Spouse name: Not on file    Number of children: Not on file    Years of education: Not on file    Highest education level: Not on file   Occupational History    Not on file   Tobacco Use    Smoking status: Never Smoker    Smokeless tobacco: Never Used   Vaping Use    Vaping Use: Never used   Substance and Sexual Activity    Alcohol use: No     Alcohol/week: 0.0 standard drinks    Drug use: No     Comment: prescriptions    Sexual activity: Never   Other Topics Concern    Not on file   Social History Narrative    Not on file     Social Determinants of Health     Financial Resource Strain:     Difficulty of Paying Living Expenses: Not on file   Food Insecurity:     Worried About Running Out of Food in the Last Year: Not on file    Tanvir of Food in the Last Year: Not on file   Transportation Needs:     Lack of Transportation (Medical): Not on file    Lack of Transportation (Non-Medical):  Not on file   Physical Activity:     Days of Exercise per Week: Not on file    Minutes of Exercise per Session: Not on file   Stress:     Feeling of Stress : Not on file   Social Connections:     Frequency of Communication with Friends and Family: Not on file    Frequency of Social Gatherings with Friends and Family: Not on file    Attends Jain Services: Not on file    Active Member of Clubs or Organizations: Not on file    Attends Club or Organization Meetings: Not on file    Marital Status: Not on file   Intimate Partner Violence:     Fear of Current or Ex-Partner: Not on file    Emotionally Abused: Not on file    Physically Abused: Not on file   Tyler Sexually Abused: Not on file   Housing Stability:     Unable to Pay for Housing in the Last Year: Not on file    Number of Places Lived in the Last Year: Not on file    Unstable Housing in the Last Year: Not on file       Past Surgical History:   Procedure Laterality Date    HX COLONOSCOPY  12    colon polyp, repeat in 5 yrs, Dr. Joseph Felix  16    gastritis, hiatal hernia (Dr. Simran Jones)    HX LAP CHOLECYSTECTOMY  10/18/2018    Laparoscopic cholecystectomy by Dr. Jefe Kim.  HX OTHER SURGICAL      cyst removed from scalp - benign       Family History   Problem Relation Age of Onset    Heart Disease Mother     Pulmonary Embolism Mother     Lung Disease Father     Diabetes Father     Heart Disease Father     COPD Father     Seizures Brother     Cancer Maternal Grandmother         OVARIAN, COLON    Diabetes Paternal Uncle     Diabetes Paternal Grandfather     Colon Cancer Other         materal grandmothers siblings x 3-4  of colon cancer    Celiac Disease Brother     Heart Disease Maternal Grandfather     Other Sister         PERIPHERAL ARTERY DISEASE    No Known Problems Sister     Anesth Problems Neg Hx         OB History             Para        Term   0            AB        Living           SAB        IAB        Ectopic        Molar        Multiple        Live Births   0                   REVIEW OF SYSTEMS:    Patient denies any recent fever, chills, nausea, vomiting, chest pain, abdominal pain, blurred vision, dizziness or shortness of breath. Vitals:    Ht 5' 2\" (1.575 m)   Wt 257 lb (116.6 kg)   BMI 47.01 kg/m²    Body mass index is 47.01 kg/m². PHYSICAL EXAM:    The patient is alert and oriented x 3 and in no acute distress. Patient ambulates with a normal gait without gait aids. Vertical midline patella anterior knee incision is well-healed.   Range of motion of the operative knee demonstrates full extension with flexion to 115 degrees. There are no instability findings to anterior/posterior or varus/valgus stress testing at 0, 30, 60, 90 degrees. There is no swelling or erythema of the operative knee is noted. There is no calf tenderness to palpation. Distal motor and sensation is intact. IMAGING:    Results from Appointment encounter on 03/24/22    XR KNEE LT 3 V    Narrative  AP standing, lateral and sunrise digital view radiographs of the left knee obtained in the office today and reviewed with the patient demonstrate anatomic alignment of components with no evidence of hardware loosening or subsidence. Of note, the patient is noted to have end-stage degenerative changes of the right knee with bone-on-bone articulation of the medial patellofemoral compartments with large medial femoral condyle and medial tibial osteophytes. Orders Placed This Encounter    XR KNEE LT 3 V     Standing Status:   Future     Number of Occurrences:   1     Standing Expiration Date:   3/25/2023    REFERRAL TO PHYSICAL THERAPY     Referral Priority:   Routine     Referral Type:   PT/OT/ST     Requested Specialty:   Physical Therapy     Number of Visits Requested:   1          ASSESSMENT/PLAN:      1. Status post total left knee replacement  -     XR KNEE LT 3 V; Future  -     REFERRAL TO PHYSICAL THERAPY  2. Acute pain of left knee  -     REFERRAL TO PHYSICAL THERAPY  3. Primary osteoarthritis of right knee  4. Balance disorder  -     REFERRAL TO PHYSICAL THERAPY        Below is the assessment and plan developed based on review of pertinent history, physical exam, labs, studies, and medications. Have discussed the patient's diagnosis and radiographic findings at length and have answered all patient questions to her satisfaction. The patient certainly has the perception of knee instability I believe is related to having poor quad and hamstring strength.   Also, the fact that she has end-stage degenerative changes in the right knee and she is relying mainly on her left knee for stability and support. Ultimately, the patient will need to have right total knee replacement. The procedure, risks, benefits and alternatives to surgery were discussed at length. Patient declines intra-articular cortisone injection in the right knee at this time. Have advised continued use of OTC NSAIDs and/or Tylenol on an as-needed basis. Have provided the patient with a prescription for outpatient physical therapy for quad and hamstring strengthening, modalities, balance training and instruction in a home exercise program.  Have asked the patient to initiate walking program and weight loss program as tolerated. Will plan on seeing the patient back for reevaluation in 4-6 weeks time should symptoms persist or worsen. The patient understands and agrees to the treatment plan as outlined above. Return if symptoms worsen or fail to improve. Dr. Jim Disla was available for immediate consultation as needed. An electronic signature was used to authenticate this note.   -- Dayami Abraham PA-C

## 2022-05-09 ENCOUNTER — HOSPITAL ENCOUNTER (OUTPATIENT)
Dept: PHYSICAL THERAPY | Age: 62
Discharge: HOME | End: 2022-05-09
Payer: MEDICARE

## 2022-05-09 PROCEDURE — 97110 THERAPEUTIC EXERCISES: CPT | Performed by: PHYSICAL THERAPIST

## 2022-05-09 PROCEDURE — 97161 PT EVAL LOW COMPLEX 20 MIN: CPT | Performed by: PHYSICAL THERAPIST

## 2022-05-09 NOTE — PROGRESS NOTES
Physical Therapy at [facility],   a part of [address]  [address] [address], [address] [place] [address], [address] [place] [address]  Phone: [phone]  Fax: [phone]      Plan of Care/Statement of Necessity for Physical Therapy Services  2-15    Patient name: Prince Muhammad  : 1960  Provider#: [id number]  Referral source: Melina Sahu PA-C      Medical/Treatment Diagnosis: Presence of left artificial knee joint [Z96.652]  Pain in left knee [M25.562]  Other abnormalities of gait and mobility [R26.89]     Prior Hospitalization: see medical history     Comorbidities: obesity, anxiety/depression, R knee OA  Prior Level of Function: limited balance and function s/p L TKA  Medications: Verified on Patient Summary List  Start of Care: 21      Onset Date: 10/25/21   The Plan of Care and following information is based on the information from the initial evaluation. Assessment/ key information: Patient reports with ongoing weakness and balance deficits stemming from TKA performed on 10/25/21. She is still using an assistive device for comfort and long distances, and BLACKMON balance testing reveals mild balance deficits, particularly with narrow base of support and single limb activities. Deficiences noted in ankle balance strategy.      Evaluation Complexity History HIGH Complexity :3+ comorbidities / personal factors will impact the outcome/ POC ; Examination HIGH Complexity : 4+ Standardized tests and measures addressing body structure, function, activity limitation and / or participation in recreation  ;Presentation LOW Complexity : Stable, uncomplicated  ;Clinical Decision Making MEDIUM Complexity : FOTO score of 26-74  Overall Complexity Rating: LOW     Problem List: pain affecting function, decrease ROM, decrease strength, edema affecting function, impaired gait/ balance, decrease ADL/ functional abilitiies, decrease activity tolerance and decrease flexibility/ joint mobility   Treatment Plan may include any combination of the following: Therapeutic exercise, Therapeutic activities, Neuromuscular re-education, Physical agent/modality, Gait/balance training, Manual therapy, Patient education and Self Care training  Patient / Family readiness to learn indicated by: asking questions and interest  Persons(s) to be included in education: patient (P)  Barriers to Learning/Limitations: None  Patient Goal (s): better balance  Patient Self Reported Health Status: good  Rehabilitation Potential: fair    Long Term Goals: To be accomplished in 10-12 treatments:   1. Pt will score at least 52 on BLACKMON balance score   2. Pt will be able to reach at least 10 inches comfortably forward to demonstrate improved balance   3. Pt will be able to walk longer distances with use of SPC or no assistive device   4. Pt will be able to use steps to walk into her building at least 1 day/week   5. Improved FOTO score to 51 or better to demonstrate improved function  Frequency / Duration: Patient to be seen 2 times per week for 10-12 treatments. Patient/ Caregiver education and instruction: self care and exercises    [x]  Plan of care has been reviewed with PTA    Certification Period: 5/9/22-8/9/22  Mariama Sprague PT, DPT 5/9/2022     ________________________________________________________________________    I certify that the above Therapy Services are being furnished while the patient is under my care. I agree with the treatment plan and certify that this therapy is necessary.     [de-identified] Signature:____________________  Date:____________Time: _________         Celetsine Ca PA-C

## 2022-05-23 ENCOUNTER — HOSPITAL ENCOUNTER (OUTPATIENT)
Dept: PHYSICAL THERAPY | Age: 62
Discharge: HOME | End: 2022-05-23
Payer: MEDICARE

## 2022-05-23 PROCEDURE — 97140 MANUAL THERAPY 1/> REGIONS: CPT

## 2022-05-23 PROCEDURE — 97112 NEUROMUSCULAR REEDUCATION: CPT

## 2022-05-23 PROCEDURE — 97110 THERAPEUTIC EXERCISES: CPT

## 2022-05-23 NOTE — PROGRESS NOTES
3PT DAILY TREATMENT NOTE - [facility] 2-15    Patient Name: Geneva Ramon  Date:2022  : 1960  [x]  Patient  Verified  Payor: Lani Mendenhall / Plan: [place] / Product Type: Medicare /    In time: 1:44P  Out time: 2:42P  Total Treatment Time (min): 58  Total Timed Codes (min): 48  1:1 Treatment Time ( [address] only): 37  Visit #:  2    Treatment Area: Presence of left artificial knee joint [Z96.652]  Pain in left knee [M25.562]  Other abnormalities of gait and mobility [R26.89]    SUBJECTIVE  Pain Level (0-10 scale): 3/10  Any medication changes, allergies to medications, adverse drug reactions, diagnosis change, or new procedure performed?: [x] No    [] Yes (see summary sheet for update)  Subjective functional status/changes:   [] No changes reported  Pt reports feeling good today but admits that she has not done any of her HEP since her last appt. The Student Physical Therapist Assistant participated in the delivery of services under the direction of Sania Lieberman OPTA; directing the service, making the skilled judgment, and who is responsible for the supervision of treatment.     OBJECTIVE    Modality rationale: decrease inflammation, decrease pain and increase tissue extensibility to improve the patients ability to decrease muscular soreness and pain modulation    Min Type Additional Details       [] Estim: []Att   []Unatt    []TENS instruct                  []IFC  []Premod   []NMES                     []Other:  []w/US   []w/ice   []w/heat  Position:  Location:       []  Traction: [] Cervical       []Lumbar                       [] Prone          []Supine                       []Intermittent   []Continuous Lbs:  [] before manual  [] after manual  []w/heat    []  Ultrasound: []Continuous   [] Pulsed                       at: []1MHz   []3MHz Location:  W/cm2:    [] Paraffin         Location:   []w/heat   10 [x]  Ice     []  Heat  []  Ice massage Position: supine  Location:FAISAL knees + back    []  Laser  []  Other: Position:  Location:      []  Vasopneumatic Device Pressure:       [] lo [] med [] hi   Temperature:      [x] Skin assessment post-treatment:  [x]intact []redness- no adverse reaction    []redness  adverse reaction:     40 min Therapeutic Exercise:  [x] See flow sheet :   Rationale: increase ROM, increase strength, improve coordination and increase proprioception to improve the patients ability to improve functional mobility and muscular strength    8 min Neuromuscular Re-education:  [x]  See flow sheet : SLS,Tandem stance, NBOS   Rationale: increase ROM, increase strength, improve coordination, improve balance and increase proprioception  to improve the patients ability to improve strength and stability for p! Free balancing           With   [] TE   [] TA   [] Neuro   [] SC   [] other: Patient Education: [x] Review HEP    [] Progressed/Changed HEP based on:   [] positioning   [] body mechanics   [] transfers   [] heat/ice application    [] other:      Other Objective/Functional Measures: --     Pain Level (0-10 scale) post treatment: 0/10    ASSESSMENT/Changes in Function:   Advised Pt of the importance of being compliant  with HEP, pt verbalized agreement and that she would be better about it. . Pt  Was challenged by balance activities today and developed R sided sided back p! Once mechanics were changed for correct form. Pt was advised on adding a supine calf stretch to HEP. Patient will continue to benefit from skilled PT services to modify and progress therapeutic interventions, address functional mobility deficits, address ROM deficits, address strength deficits, analyze and address soft tissue restrictions, analyze and cue movement patterns and analyze and modify body mechanics/ergonomics to attain remaining goals. []  See Plan of Care  []  See progress note/recertification  []  See Discharge Summary         Progress towards goals / Updated goals:  Long Term Goals:  To be accomplished in 10-12 treatments:              1. Pt will score at least 52 on BLACKMON balance score              2. Pt will be able to reach at least 10 inches comfortably forward to demonstrate improved balance              3. Pt will be able to walk longer distances with use of SPC or no assistive device              4. Pt will be able to use steps to walk into her building at least 1 day/week              5. Improved FOTO score to 51 or better to demonstrate improved function  Frequency / Duration: Patient to be seen 2 times per week for 10-12 treatments.     PLAN  [x]  Upgrade activities as tolerated     [x]  Continue plan of care  []  Update interventions per flow sheet       []  Discharge due to:_  []  Other:_      MARJORIE Contreras 5/23/2022

## 2022-05-25 ENCOUNTER — HOSPITAL ENCOUNTER (OUTPATIENT)
Dept: PHYSICAL THERAPY | Age: 62
Discharge: HOME | End: 2022-05-25
Payer: MEDICARE

## 2022-05-25 PROCEDURE — 97110 THERAPEUTIC EXERCISES: CPT | Performed by: PHYSICAL THERAPIST

## 2022-05-25 PROCEDURE — 97112 NEUROMUSCULAR REEDUCATION: CPT | Performed by: PHYSICAL THERAPIST

## 2022-05-25 NOTE — PROGRESS NOTES
PT DAILY TREATMENT NOTE - [facility] 2-15    Patient Name: Jose Daniel Ibarra  Date:2022  : 1960  [x]  Patient  Verified  Payor: Alison Waldens / Plan: [place] MED[place] PART [place] & B / Product Type: Medicare /    In time: 120p  Out time: 200p  Total Treatment Time (min): 40  Total Timed Codes (min): 40  1:1 Treatment Time ([facility] only): 40   Visit #:  3    Treatment Area: Presence of left artificial knee joint [Z96.652]  Pain in left knee [M25.562]  Other abnormalities of gait and mobility [R26.89]    SUBJECTIVE  Pain Level (0-10 scale): 0  Any medication changes, allergies to medications, adverse drug reactions, diagnosis change, or new procedure performed?: [x] No    [] Yes (see summary sheet for update)  Subjective functional status/changes:   [] No changes reported  Was really sore after last session, but is feeling a little better today. OBJECTIVE      32 min Therapeutic Exercise:  [x]? See flow sheet :   Rationale: increase ROM, increase strength, improve coordination and increase proprioception to improve the patients ability to improve functional mobility and muscular strength     8 min Neuromuscular Re-education:  [x]? See flow sheet : SLS,Tandem stance, NBOS   Rationale: increase ROM, increase strength, improve coordination, improve balance and increase proprioception  to improve the patients ability to improve strength and stability for p! Free balancing                                                                  With   []? TE   []? TA   []? Neuro   []? SC   []? other: Patient Education: [x]? Review HEP    []? Progressed/Changed HEP based on:   []? positioning   []? body mechanics   []? transfers   []? heat/ice application    []? other:       Other Objective/Functional Measures: --        Pain Level (0-10 scale) post treatment: 0/10     ASSESSMENT/Changes in Function:   Kept exercises essentially the same today, and discussed she should probably not feel quite as sore after today's session. Patient will continue to benefit from skilled PT services to modify and progress therapeutic interventions, address functional mobility deficits, address ROM deficits, address strength deficits, analyze and address soft tissue restrictions, analyze and cue movement patterns and analyze and modify body mechanics/ergonomics to attain remaining goals. []? See Plan of Care  []? See progress note/recertification  []? See Discharge Summary         Progress towards goals / Updated goals:  Long Term Goals: To be accomplished in 10-12 treatments:               1. Pt will score at least 52 on BLACKMON balance score               2. Pt will be able to reach at least 10 inches comfortably forward to demonstrate improved balance               3. Pt will be able to walk longer distances with use of SPC or no assistive device               4. Pt will be able to use steps to walk into her building at least 1 day/week               5. Improved FOTO score to 51 or better to demonstrate improved function   Frequency / Duration: Patient to be seen 2 times per week for 10-12 treatments.     PLAN  [x]? Upgrade activities as tolerated     [x]? Continue plan of care  []? Update interventions per flow sheet       []? Discharge due to:_  []?   Other:_        Zehra Burleson, PT, DPT 5/25/2022

## 2022-05-31 ENCOUNTER — HOSPITAL ENCOUNTER (OUTPATIENT)
Dept: PHYSICAL THERAPY | Age: 62
Discharge: HOME | End: 2022-05-31
Payer: MEDICARE

## 2022-05-31 PROCEDURE — 97112 NEUROMUSCULAR REEDUCATION: CPT | Performed by: PHYSICAL THERAPIST

## 2022-05-31 PROCEDURE — 97110 THERAPEUTIC EXERCISES: CPT | Performed by: PHYSICAL THERAPIST

## 2022-05-31 NOTE — PROGRESS NOTES
PT DAILY TREATMENT NOTE - [facility] 2-15    Patient Name: Anam Mabry  Date:2022  : 1960  [x]  Patient  Verified  Payor: Sara Patel / Plan: [place] MED[place] & B / Product Type: Medicare /    In time: 115p  Out time: 210p  Total Treatment Time (min): 55  Total Timed Codes (min): 45  1:1 Treatment Time ( [address] only): 40   Visit #:  4    Treatment Area: Presence of left artificial knee joint [Z96.652]  Pain in left knee [M25.562]  Other abnormalities of gait and mobility [R26.89]    SUBJECTIVE  Pain Level (0-10 scale): 2-3  Any medication changes, allergies to medications, adverse drug reactions, diagnosis change, or new procedure performed?: [x] No    [] Yes (see summary sheet for update)  Subjective functional status/changes:   [] No changes reported  Doing okay today. OBJECTIVE  Modality rationale: decrease inflammation and decrease pain to improve the patients ability to move with reduced pain   Min Type Additional Details       [] Estim: []Att   []Unatt    []TENS instruct                  []IFC  []Premod   []NMES                     []Other:  []w/US   []w/ice   []w/heat  Position:  Location:       []  Traction: [] Cervical       []Lumbar                       [] Prone          []Supine                       []Intermittent   []Continuous Lbs:  [] before manual  [] after manual  []w/heat    []  Ultrasound: []Continuous   [] Pulsed                       at: []1MHz   []3MHz Location:  W/cm2:    [] Paraffin         Location:   []w/heat   10 [x]  Ice     []  Heat  []  Ice massage Position:  Location: back    []  Laser  []  Other: Position:  Location:      []  Vasopneumatic Device Pressure:       [] lo [] med [] hi   Temperature:      [x] Skin assessment post-treatment:  [x]intact []redness- no adverse reaction    []redness  adverse reaction:     32 min Therapeutic Exercise:  [x]? ? See flow sheet :   Rationale: increase ROM, increase strength, improve coordination and increase proprioception to improve the patients ability to improve functional mobility and muscular strength     13 min Neuromuscular Re-education:  [x]? ?  See flow sheet : SLS,Tandem stance, NBOS   Rationale: increase ROM, increase strength, improve coordination, improve balance and increase proprioception  to improve the patients ability to improve strength and stability for p! Free balancing                                                                  With   []?? TE   []?? TA   []? ? Neuro   []?? SC   []?? other: Patient Education: [x]? ? Review HEP    []? ? Progressed/Changed HEP based on:   []?? positioning   []? ? body mechanics   []? ? transfers   []? ? heat/ice application    []? ? other:       Other Objective/Functional Measures: --        Pain Level (0-10 scale) post treatment: 0/10     ASSESSMENT/Changes in Function:   Did well today. Reviewed different balance strategies. Added in a few back stretches to help with back pain today. Patient will continue to benefit from skilled PT services to modify and progress therapeutic interventions, address functional mobility deficits, address ROM deficits, address strength deficits, analyze and address soft tissue restrictions, analyze and cue movement patterns and analyze and modify body mechanics/ergonomics to attain remaining goals.     []? ?  See Plan of Care  []? ?  See progress note/recertification  []? ?  See Discharge Summary         Progress towards goals / Updated goals:  Long Term Goals: To be accomplished in 10-12 treatments:               1. Pt will score at least 52 on BLACKMON balance score               2. Pt will be able to reach at least 10 inches comfortably forward to demonstrate improved balance               3. Pt will be able to walk longer distances with use of SPC or no assistive device  PROGRESSING              4. Pt will be able to use steps to walk into her building at least 1 day/week               5.  Improved FOTO score to 51 or better to demonstrate improved function   Frequency / Duration: Patient to be seen 2 times per week for 10-12 treatments.     PLAN  [x]? ?  Upgrade activities as tolerated     [x]? ?  Continue plan of care  []? ?  Update interventions per flow sheet       []? ?  Discharge due to:_  []??  Other:_           Maribell Razo, PT, DPT 5/31/2022

## 2022-06-02 ENCOUNTER — HOSPITAL ENCOUNTER (OUTPATIENT)
Dept: PHYSICAL THERAPY | Age: 62
Discharge: HOME | End: 2022-06-02
Payer: MEDICARE

## 2022-06-02 PROCEDURE — 97110 THERAPEUTIC EXERCISES: CPT | Performed by: PHYSICAL THERAPIST

## 2022-06-02 PROCEDURE — 97112 NEUROMUSCULAR REEDUCATION: CPT | Performed by: PHYSICAL THERAPIST

## 2022-06-02 NOTE — PROGRESS NOTES
PT DAILY TREATMENT NOTE - [facility] 2-15    Patient Name: Mitesh Kim  Date:2022  : 1960  [x]  Patient  Verified  Payor: [place] MEDICARE / Plan: [place] MEDICARE PART A & B / Product Type: Medicare /    In time: 108p  Out time:   Total Treatment Time (min): 54  Total Timed Codes (min): 54  1:1 Treatment Time ([facility] only): 52   Visit #:  5    Treatment Area: Presence of left artificial knee joint [Z96.652]  Pain in left knee [M25.562]  Other abnormalities of gait and mobility [R26.89]    SUBJECTIVE  Pain Level (0-10 scale): 3  Any medication changes, allergies to medications, adverse drug reactions, diagnosis change, or new procedure performed?: [x] No    [] Yes (see summary sheet for update)  Subjective functional status/changes:   [] No changes reported  Doing fine today. Back is still more sore v. Knees. OBJECTIVE      39 min Therapeutic Exercise:  [x]? ?? See flow sheet :   Rationale: increase ROM, increase strength, improve coordination and increase proprioception to improve the patients ability to improve functional mobility and muscular strength     15 min Neuromuscular Re-education:  [x]? ??  See flow sheet : tiltboard, hurdles, forwards and lateral   Rationale: increase ROM, increase strength, improve coordination, improve balance and increase proprioception  to improve the patients ability to improve strength and stability for p! Free balancing                                                                  With   []??? TE   []??? TA   []??? Neuro   []??? SC   []? ?? other: Patient Education: [x]??? Review HEP    []? ?? Progressed/Changed HEP based on:   []??? positioning   []? ?? body mechanics   []? ?? transfers   []? ?? heat/ice application    []? ?? other:       Other Objective/Functional Measures: --        Pain Level (0-10 scale) post treatment: 0/10     ASSESSMENT/Changes in Function:   Added in hurdles today, which were tolerated well, but challenged with hip drive on L   Patient will continue to benefit from skilled PT services to modify and progress therapeutic interventions, address functional mobility deficits, address ROM deficits, address strength deficits, analyze and address soft tissue restrictions, analyze and cue movement patterns and analyze and modify body mechanics/ergonomics to attain remaining goals.     []? ??  See Plan of Care  []? ??  See progress note/recertification  []? ??  See Discharge Summary         Progress towards goals / Updated goals:  Long Term Goals: To be accomplished in 10-12 treatments:               1. Pt will score at least 52 on BLACKMON balance score               2. Pt will be able to reach at least 10 inches comfortably forward to demonstrate improved balance               3. Pt will be able to walk longer distances with use of SPC or no assistive device  MET              4. Pt will be able to use steps to walk into her building at least 1 day/week               5. Improved FOTO score to 51 or better to demonstrate improved function   Frequency / Duration: Patient to be seen 2 times per week for 10-12 treatments.     PLAN  [x]???  Upgrade activities as tolerated     [x]? ??  Continue plan of care  []? ??  Update interventions per flow sheet       []???  Discharge due to:_  []???  Other:_          Rubén Lee, PT, DPT 6/2/2022

## 2022-06-06 ENCOUNTER — OFFICE VISIT (OUTPATIENT)
Dept: INTERNAL MEDICINE CLINIC | Age: 62
End: 2022-06-06
Payer: MEDICARE

## 2022-06-06 ENCOUNTER — HOSPITAL ENCOUNTER (OUTPATIENT)
Dept: PHYSICAL THERAPY | Age: 62
Discharge: HOME | End: 2022-06-06
Payer: MEDICARE

## 2022-06-06 VITALS
SYSTOLIC BLOOD PRESSURE: 120 MMHG | HEART RATE: 90 BPM | HEIGHT: 62 IN | DIASTOLIC BLOOD PRESSURE: 80 MMHG | OXYGEN SATURATION: 96 % | TEMPERATURE: 97.5 F | WEIGHT: 259.8 LBS | BODY MASS INDEX: 47.81 KG/M2 | RESPIRATION RATE: 16 BRPM

## 2022-06-06 DIAGNOSIS — Z00.00 MEDICARE ANNUAL WELLNESS VISIT, SUBSEQUENT: Primary | ICD-10-CM

## 2022-06-06 DIAGNOSIS — Z12.11 COLON CANCER SCREENING: ICD-10-CM

## 2022-06-06 DIAGNOSIS — H91.90 HEARING LOSS, UNSPECIFIED HEARING LOSS TYPE, UNSPECIFIED LATERALITY: ICD-10-CM

## 2022-06-06 DIAGNOSIS — E03.9 ACQUIRED HYPOTHYROIDISM: ICD-10-CM

## 2022-06-06 DIAGNOSIS — Z13.39 SCREENING FOR ALCOHOLISM: ICD-10-CM

## 2022-06-06 PROBLEM — N18.30 CHRONIC RENAL DISEASE, STAGE III (HCC): Status: ACTIVE | Noted: 2022-06-06

## 2022-06-06 LAB
T4 FREE SERPL-MCNC: 1.1 NG/DL (ref 0.8–1.5)
TSH SERPL DL<=0.05 MIU/L-ACNC: 4.03 UIU/ML (ref 0.36–3.74)

## 2022-06-06 PROCEDURE — G0442 ANNUAL ALCOHOL SCREEN 15 MIN: HCPCS | Performed by: INTERNAL MEDICINE

## 2022-06-06 PROCEDURE — 97112 NEUROMUSCULAR REEDUCATION: CPT | Performed by: PHYSICAL THERAPIST

## 2022-06-06 PROCEDURE — G0439 PPPS, SUBSEQ VISIT: HCPCS | Performed by: INTERNAL MEDICINE

## 2022-06-06 PROCEDURE — 3017F COLORECTAL CA SCREEN DOC REV: CPT | Performed by: INTERNAL MEDICINE

## 2022-06-06 PROCEDURE — 97110 THERAPEUTIC EXERCISES: CPT | Performed by: PHYSICAL THERAPIST

## 2022-06-06 PROCEDURE — G9899 SCRN MAM PERF RSLTS DOC: HCPCS | Performed by: INTERNAL MEDICINE

## 2022-06-06 PROCEDURE — G9717 DOC PT DX DEP/BP F/U NT REQ: HCPCS | Performed by: INTERNAL MEDICINE

## 2022-06-06 PROCEDURE — G8417 CALC BMI ABV UP PARAM F/U: HCPCS | Performed by: INTERNAL MEDICINE

## 2022-06-06 PROCEDURE — G8427 DOCREV CUR MEDS BY ELIG CLIN: HCPCS | Performed by: INTERNAL MEDICINE

## 2022-06-06 RX ORDER — LEVOTHYROXINE SODIUM 25 UG/1
25 TABLET ORAL
Qty: 90 TABLET | Refills: 1 | Status: SHIPPED | COMMUNITY
Start: 2022-06-06

## 2022-06-06 NOTE — PROGRESS NOTES
PT DAILY TREATMENT NOTE - [facility] 2-15    Patient Name: Laine Márquez  Date:2022  : 1960  [x]  Patient  Verified  Payor: Brenda Howe / Plan: [place] MEDICARE PART A & B / Product Type: Medicare /    In time: 225p  Out time: 320p  Total Treatment Time (min): 55  Total Timed Codes (min): 55  1:1 Treatment Time ( [address] only): 50   Visit #:  6    Treatment Area: Presence of left artificial knee joint [Z96.652]  Pain in left knee [M25.562]  Other abnormalities of gait and mobility [R26.89]    SUBJECTIVE  Pain Level (0-10 scale): 0  Any medication changes, allergies to medications, adverse drug reactions, diagnosis change, or new procedure performed?: [x] No    [] Yes (see summary sheet for update)  Subjective functional status/changes:   [] No changes reported  Doing fairly well today. Still having trouble with balance exercises at home. OBJECTIVE     40 min Therapeutic Exercise:  [x]? ??? See flow sheet :   Rationale: increase ROM, increase strength, improve coordination and increase proprioception to improve the patients ability to improve functional mobility and muscular strength     15 min Neuromuscular Re-education:  [x]? ???  See flow sheet : tiltboard, hurdles, forwards and lateral   Rationale: increase ROM, increase strength, improve coordination, improve balance and increase proprioception  to improve the patients ability to improve strength and stability for p!  Free balancing                                                                  With   []???? TE   []???? TA   []???? Neuro   []???? SC   []???? other: Patient Education: [x]???? Review HEP    []???? Progressed/Changed HEP based on:   []???? positioning   []???? body mechanics   []???? transfers   []???? heat/ice application    []???? other:       Other Objective/Functional Measures: --        Pain Level (0-10 scale) post treatment: 0/10     ASSESSMENT/Changes in Function:   Difficulty with slow marches (4 second step) and will have her work on this at home. Tandem stance x30sec. Bilaterally is a significant improvement. Patient will continue to benefit from skilled PT services to modify and progress therapeutic interventions, address functional mobility deficits, address ROM deficits, address strength deficits, analyze and address soft tissue restrictions, analyze and cue movement patterns and analyze and modify body mechanics/ergonomics to attain remaining goals.     []????  See Plan of Care  []????  See progress note/recertification  []????  See Discharge Summary         Progress towards goals / Updated goals:  Long Term Goals: To be accomplished in 10-12 treatments:               1. Pt will score at least 52 on BLACKMON balance score               2. Pt will be able to reach at least 10 inches comfortably forward to demonstrate improved balance               3. Pt will be able to walk longer distances with use of SPC or no assistive device  MET              4. Pt will be able to use steps to walk into her building at least 1 day/week               5. Improved FOTO score to 51 or better to demonstrate improved function   Frequency / Duration: Patient to be seen 2 times per week for 10-12 treatments.     PLAN  [x]????  Upgrade activities as tolerated     [x]? ???  Continue plan of care  []????  Update interventions per flow sheet       []????  Discharge due to:_  []????  Other:_          Jadyn Anthony, PT, DPT 6/6/2022

## 2022-06-06 NOTE — PROGRESS NOTES
This is the Subsequent Medicare Annual Wellness Exam, performed 12 months or more after the Initial AWV or the last Subsequent AWV    I have reviewed the patient's medical history in detail and updated the computerized patient record. She reports feeling well today. She has been in rehab after having had knee surgery. She had been off her synthroid due to having run out. She reports feeling bad and subsequently found some synthroid at home. She resumed the medication and feels better. We discussed the need to repeat testing for this. Reviewed health maintenance. Orders placed as needed. Assessment/Plan   Education and counseling provided:  Are appropriate based on today's review and evaluation    1. Medicare annual wellness visit, subsequent  2. Acquired hypothyroidism  -     levothyroxine (SYNTHROID) 25 mcg tablet; Take 1 Tablet by mouth nightly., Mail Order, Disp-90 Tablet, R-1  -     T4, FREE; Future  -     TSH 3RD GENERATION; Future  3. Screening for alcoholism  -     [place] ANNUAL ALCOHOL SCREEN 15 MIN  4.  Colon cancer screening  -     REFERRAL TO GASTROENTEROLOGY  5. Hearing loss, unspecified hearing loss type, unspecified laterality  -     REFERRAL TO ENT-OTOLARYNGOLOGY       Depression Risk Factor Screening     3 most recent PHQ Screens 6/6/2022   Little interest or pleasure in doing things Several days   Feeling down, depressed, irritable, or hopeless Several days   Total Score PHQ 2 2   Trouble falling or staying asleep, or sleeping too much -   Feeling tired or having little energy -   Poor appetite, weight loss, or overeating -   Feeling bad about yourself - or that you are a failure or have let yourself or your family down -   Trouble concentrating on things such as school, work, reading, or watching TV -   Moving or speaking so slowly that other people could have noticed; or the opposite being so fidgety that others notice -   Thoughts of being better off dead, or hurting yourself in some way -   PHQ 9 Score -   How difficult have these problems made it for you to do your work, take care of your home and get along with others -       Alcohol & Drug Abuse Risk Screen    Do you average more than 1 drink per night or more than 7 drinks a week:  No    On any one occasion in the past three months have you have had more than 3 drinks containing alcohol:  No          Functional Ability and Level of Safety    Hearing: Hearing is good. Activities of Daily Living: The home contains: no safety equipment. Patient does total self care      Ambulation: with mild difficulty Post her knee surgery     Fall Risk:  Fall Risk Assessment, last 12 mths 5/29/2019   Able to walk? Yes   Fall in past 12 months?  No      Abuse Screen:  Patient is not abused       Cognitive Screening    Has your family/caregiver stated any concerns about your memory: no         Health Maintenance Due     Health Maintenance Due   Topic Date Due    Cervical cancer screen  Never done    Shingrix Vaccine Age 49> (1 of 2) Never done    Colorectal Cancer Screening Combo  09/20/2017    COVID-19 Vaccine (3 - Booster for Kirke Franklyn series) 08/23/2021       Patient Care Team   Patient Care Team:  Nahun Dunne MD as PCP - General (Internal Medicine Physician)  Nahun Dunne MD as PCP - [facility] Empaneled Provider  Callum Fisher MD (Gastroenterology)  Jose Luis Nino MD (Ophthalmology)  Srinivasan Pennington MD (Obstetrics & Gynecology)  Doris Jean Baptiste MD as Surgeon (General Surgery)  Christy Ha NP as Nurse Practitioner (General Surgery)  Ricky Watt (Orthopedic Surgery)    History     Patient Active Problem List   Diagnosis Code    NIKOLE (obstructive sleep apnea) G47.33    Bipolar I disorder, most recent episode depressed, severe without psychotic features ([facility] [address] F31.4    Pure hypercholesterolemia E78.00    Hypothyroidism E03.9    Vitamin D deficiency E55.9    GERD (gastroesophageal reflux disease) K21.9    OA (osteoarthritis) M19.90    DDD (degenerative disc disease), lumbosacral M51.37    Symptomatic cholelithiasis K80.20    Morbid obesity (HCC) E66.01    Localized osteoarthritis of both knees M17.0    Arthritis of right knee M17.11    Weight above 97th percentile Z78.9    Pain and swelling of right shoulder M25.511, M25.411    Bilateral chronic knee pain M25.561, M25.562, G89.29    Incomplete tear of right rotator cuff M75.111    Bilateral knee effusions M25.461, M25.462    Left foot pain M79.672    Bilateral sciatica M54.31, M54.32    Hip pain M25.559     Past Medical History:   Diagnosis Date    Anxiety and depression     [facility]    Bipolar Disorder     [facility]    Chronic pain     LEFT KNEE    DDD (degenerative disc disease), lumbosacral     L5-S1 ([facility])    Depression     Fatty liver 2018    Foot fracture, right     h/o    Genital herpes     GERD (gastroesophageal reflux disease)     History of gastritis     Dr. Mccoy Niece Hyperlipidemia     Hypothyroidism (acquired)     Menopause     LMP- 51years old?  OA (osteoarthritis)     knees, Dr. Montoya Sleeoctavio    Obesity     NIKOLE on CPAP 2007    adherent with CPAP use    Symptomatic cholelithiasis 10/18/2017      Past Surgical History:   Procedure Laterality Date    HX COLONOSCOPY  9/20/12    colon polyp, repeat in 5 yrs, Dr. Danni Zamora HX ENDOSCOPY  5/19/16    gastritis, hiatal hernia (Dr. Yasmine Tolentino)    HX LAP CHOLECYSTECTOMY  10/18/2018    Laparoscopic cholecystectomy by Dr. Orlando Casper.  HX OTHER SURGICAL      cyst removed from scalp - benign     Current Outpatient Medications   Medication Sig Dispense Refill    levothyroxine (SYNTHROID) 25 mcg tablet Take 1 Tablet by mouth nightly. 90 Tablet 0    cholecalciferol, vitamin D3, (VITAMIN D3) 2,000 unit tab Take 4,000 Units by mouth nightly.  venlafaxine (EFFEXOR) 75 mg tablet Take 75 mg by mouth daily.  PT TAKES A TOTAL  mg EVERY MORNING  venlafaxine-SR (EFFEXOR XR) 150 mg capsule Take 150 mg by mouth daily. PT TAKES A TOTAL OF 225mg EVERY MORNING      risperiDONE (RISPERDAL) 3 mg tablet Take 3 mg by mouth nightly.  cpap machine kit by Does Not Apply route. Dx 327.23 1 Kit 0    TRAZODONE HCL (TRAZODONE PO) Take 50 mg by mouth nightly as needed.  lamoTRIgine (LAMICTAL) 100 mg tablet Take 200 mg by mouth nightly.        Allergies   Allergen Reactions    Flagyl [Metronidazole] Other (comments)     HEADACHES AND MENTAL DISTRESS       Family History   Problem Relation Age of Onset    Heart Disease Mother     Pulmonary Embolism Mother     Lung Disease Father     Diabetes Father     Heart Disease Father     COPD Father     Seizures Brother     Cancer Maternal Grandmother         OVARIAN, COLON    Diabetes Paternal Uncle     Diabetes Paternal Grandfather     Colon Cancer Other         materal grandmothers siblings x 3-4  of colon cancer    Celiac Disease Brother     Heart Disease Maternal Grandfather     Other Sister         PERIPHERAL ARTERY DISEASE    No Known Problems Sister     Anesth Problems Neg Hx      Social History     Tobacco Use    Smoking status: Never Smoker    Smokeless tobacco: Never Used   Substance Use Topics    Alcohol use: No     Alcohol/week: 0.0 standard drinks         Joseluis Wadsworth MD

## 2022-06-06 NOTE — PROGRESS NOTES
Chief Complaint   Patient presents with   [facility]     Reviewed record in preparation for visit and have obtained necessary documentation. Identified pt with two pt identifiers(name and ).       Health Maintenance Due   Topic    Cervical cancer screen     Shingrix Vaccine Age 49> (1 of 2)    Colorectal Cancer Screening Combo     Medicare Yearly Exam     COVID-19 Vaccine (3 - Booster for Moderna series)         Chief Complaint   Patient presents with    Annual Wellness Visit        Wt Readings from Last 3 Encounters:   22 259 lb 12.8 oz (117.8 kg)   22 257 lb (116.6 kg)   22 256 lb 12.8 oz (116.5 kg)     Temp Readings from Last 3 Encounters:   22 97.5 °F (36.4 °C) (Temporal)   22 97.1 °F (36.2 °C)   10/28/21 98.5 °F (36.9 °C)     BP Readings from Last 3 Encounters:   22 120/80   22 110/80   10/28/21 118/71     Pulse Readings from Last 3 Encounters:   22 90   22 96   10/28/21 97           Learning Assessment:  :     Learning Assessment 10/18/2017 2014   PRIMARY LEARNER Patient Patient   HIGHEST LEVEL OF EDUCATION - PRIMARY LEARNER  > 4 YEARS OF COLLEGE > 4 YEARS OF COLLEGE   BARRIERS PRIMARY LEARNER NONE OTHER   CO-LEARNER CAREGIVER Yes No   CO-LEARNER NAME mark Winston -   CO-LEARNER HIGHEST LEVEL OF EDUCATION 4 YEARS OF COLLEGE -   1 Paramjit Hernandez -   PRIMARY LANGUAGE ENGLISH ENGLISH   PRIMARY LANGUAGE CO-LEARNER ENGLISH -    NEED No No   LEARNER PREFERENCE PRIMARY DEMONSTRATION DEMONSTRATION     - LISTENING   LEARNING SPECIAL TOPICS - no   ANSWERED BY self patient   RELATIONSHIP SELF SELF   ASSESSMENT COMMENT none n/a       Depression Screening:  :     3 most recent PHQ Screens 2022   Little interest or pleasure in doing things Several days   Feeling down, depressed, irritable, or hopeless Several days   Total Score PHQ 2 2   Trouble falling or staying asleep, or sleeping too much -   Feeling tired or having little energy -   Poor appetite, weight loss, or overeating -   Feeling bad about yourself - or that you are a failure or have let yourself or your family down -   Trouble concentrating on things such as school, work, reading, or watching TV -   Moving or speaking so slowly that other people could have noticed; or the opposite being so fidgety that others notice -   Thoughts of being better off dead, or hurting yourself in some way -   PHQ 9 Score -   How difficult have these problems made it for you to do your work, take care of your home and get along with others -       Fall Risk Assessment:  :     Fall Risk Assessment, last 12 mths 5/29/2019   Able to walk? Yes   Fall in past 12 months? No       Abuse Screening:  :     Abuse Screening Questionnaire 6/6/2022 6/6/2022 3/16/2022 10/4/2021 6/27/2017 8/6/2014   Do you ever feel afraid of your partner? N N N N N N   Are you in a relationship with someone who physically or mentally threatens you? N N N N N N   Is it safe for you to go home? Y Y Y Y Y Y       Coordination of Care Questionnaire:  :     1) Have you been to an emergency room, urgent care clinic since your last visit? no   Hospitalized since your last visit? no             2) Have you seen or consulted any other health care providers outside of [address] [place] [address] since your last visit? no  (Include any pap smears or colon screenings in this section.)    3) Do you have an Advance Directive on file? no    4) Are you interested in receiving information on Advance Directives? NO      Patient is accompanied by self I have received verbal consent from Josh Estrada to discuss any/all medical information while they are present in the room. Reviewed record  In preparation for visit and have obtained necessary documentation.

## 2022-06-06 NOTE — PATIENT INSTRUCTIONS

## 2022-06-08 ENCOUNTER — HOSPITAL ENCOUNTER (OUTPATIENT)
Dept: PHYSICAL THERAPY | Age: 62
Discharge: HOME | End: 2022-06-08
Payer: MEDICARE

## 2022-06-08 PROCEDURE — 97110 THERAPEUTIC EXERCISES: CPT | Performed by: PHYSICAL THERAPIST

## 2022-06-08 PROCEDURE — 97112 NEUROMUSCULAR REEDUCATION: CPT | Performed by: PHYSICAL THERAPIST

## 2022-06-08 NOTE — PROGRESS NOTES
Physical Therapy at [facility],   a part of [facility]  [address] [address], [address] [place] [address], [address] [place] [address]  Phone: [phone] Fax: [phone]    Progress Note    Name: Candelaria Burton   : 1960   MD: Tomas Barth PA-C       Treatment Diagnosis: Presence of left artificial knee joint [Z96.652]  Pain in left knee [M25.562]  Other abnormalities of gait and mobility [R26.89]  Start of Care:     Visits from Start of Care: 7  Missed Visits: 0    Summary of Care: Ms. Gudelia Garcia has made good progress thus far with PT addressing her balance issues s/p TKA. She has made improvements to her BLACKMON balance score, and is reporting improved ambulation and stair navigation outside of clinic. Long Term Goals: To be accomplished in 10-12 treatments:               1. Pt will score at least 52 on BLACKMON balance score NEARLY MET (51/56 on 22)              2. Pt will be able to reach at least 10 inches comfortably forward to demonstrate improved balance NEARLY MET (9\" on 22)              3. Pt will be able to walk longer distances with use of SPC or no assistive device  MET              4. Pt will be able to use steps to walk into her building at least 1 day/week  MET              5. Improved FOTO score to 51 or better to demonstrate improved function PROGRESSING    Assessment / Recommendations: Other: Progressing well thus far; will continue with 2 more scheduled visits, then f/u in 2 weeks thereafter to see how she is managing at home.          Hali Linares, PT, DPT 2022

## 2022-06-08 NOTE — PROGRESS NOTES
PT DAILY TREATMENT NOTE - [facility] 2-15    Patient Name: Yessica Ibarra  Date:2022  : 1960  [x]  Patient  Verified  Payor: Joanie Vuong / Plan: [place] MED[place] PART [place] & B / Product Type: Medicare /    In time: 220p  Out time: 320p  Total Treatment Time (min): 60  Total Timed Codes (min): 60  1:1 Treatment Time ( only): 62   Visit #:  7    Treatment Area: Presence of left artificial knee joint [Z96.652]  Pain in left knee [M25.562]  Other abnormalities of gait and mobility [R26.89]    SUBJECTIVE  Pain Level (0-10 scale): \"sore\"  Any medication changes, allergies to medications, adverse drug reactions, diagnosis change, or new procedure performed?: [x] No    [] Yes (see summary sheet for update)  Subjective functional status/changes:   [] No changes reported  Knees (R>L) are a little more sore than usual today. OBJECTIVE    30 min Therapeutic Exercise:  [x]? ???? See flow sheet :   Rationale: increase ROM, increase strength, improve coordination and increase proprioception to improve the patients ability to improve functional mobility and muscular strength     30 min Neuromuscular Re-education:  [x]? ????  See flow sheet : BLACKMON balance re-testing   Rationale: increase ROM, increase strength, improve coordination, improve balance and increase proprioception  to improve the patients ability to improve strength and stability for p!  Free balancing                                                                  With   []????? TE   []????? TA   []????? Neuro   []????? SC   []????? other: Patient Education: [x]????? Review HEP    []????? Progressed/Changed HEP based on:   []????? positioning   []????? body mechanics   []????? transfers   []????? heat/ice application    []????? other:       Other Objective/Functional Measures: --        Pain Level (0-10 scale) post treatment: 0/10     ASSESSMENT/Changes in Function:   []?????  See Plan of Care  [x]?????  See progress note/recertification  []?????  See Discharge Summary           PLAN  [x]?????  Upgrade activities as tolerated     [x]?????  Continue plan of care  []?????  Update interventions per flow sheet       []?????  Discharge due to:_  []?????  Other:_        Dolores Fuentes, PT, DPT 6/8/2022

## 2022-06-13 ENCOUNTER — APPOINTMENT (OUTPATIENT)
Dept: PHYSICAL THERAPY | Age: 62
End: 2022-06-13
Payer: MEDICARE

## 2022-06-15 ENCOUNTER — APPOINTMENT (OUTPATIENT)
Dept: PHYSICAL THERAPY | Age: 62
End: 2022-06-15
Payer: MEDICARE

## 2022-06-29 ENCOUNTER — APPOINTMENT (OUTPATIENT)
Dept: PHYSICAL THERAPY | Age: 62
End: 2022-06-29
Payer: MEDICARE

## 2022-07-06 NOTE — THERAPY DISCHARGE
Physical Therapy at [facility],   a part of [facility] [place]  [address] [address], [address] [place] [address], [address] [place] [address]  Phone: [phone] Fax: [phone]      Discharge Summary 2-15      Patient name: Yessica Ibarra  : 1960  Provider#: [id number]  Referral source: Adriana Jordan PA-C      Medical/Treatment Diagnosis: Presence of left artificial knee joint [Z96.652]  Pain in left knee [M25.562]  Other abnormalities of gait and mobility [R26.89]     Prior Hospitalization: see medical history     Comorbidities: obesity, anxiety/depression, R knee OA  Prior Level of Function: limited balance and function s/p L TKA  Medications: Verified on Patient Summary List  Start of Care: 22                                                                       Onset Date: 10/25/21        Visits from Start of Care: 7     Missed Visits: 2  Reporting Period : 22 to 22    Assessment/Summary of care:Summary of Care: Ms. Lenora Kelley made good progress thus far with PT addressing her balance issues s/p TKA. She made improvements to her BLACKMON balance score, and reported improved ambulation and stair navigation outside of clinic. Was managing on her own when when we spoke on the phone on 22, and will continue to have her manage on her own at this time.   [address] be accomplished in 10-12 treatments:               1. Pt will score at least 52 on BLACKMON balance score NEARLY MET (51/56 on 22)              2. Pt will be able to reach at least 10 inches comfortably forward to demonstrate improved balance NEARLY MET (9\" on 22)              3. Pt will be able to walk longer distances with use of SPC or no assistive device  MET              4. Pt will be able to use steps to walk into her building at least 1 day/week  MET              5.  Improved FOTO score to 51 or better to demonstrate improved function PROGRESSING    RECOMMENDATIONS:  [x]Discontinue therapy: [x]Patient has reached or is progressing toward set goals     []Patient is non-compliant or has abdicated     []Due to lack of appreciable progress towards set [address] Fran Mary, PT, DPT 7/6/2022

## 2023-01-11 ENCOUNTER — OFFICE VISIT (OUTPATIENT)
Dept: INTERNAL MEDICINE CLINIC | Age: 63
End: 2023-01-11
Payer: MEDICARE

## 2023-01-11 VITALS
OXYGEN SATURATION: 95 % | SYSTOLIC BLOOD PRESSURE: 130 MMHG | HEIGHT: 62 IN | TEMPERATURE: 97.1 F | DIASTOLIC BLOOD PRESSURE: 80 MMHG | RESPIRATION RATE: 16 BRPM | WEIGHT: 266.6 LBS | BODY MASS INDEX: 49.06 KG/M2 | HEART RATE: 90 BPM

## 2023-01-11 DIAGNOSIS — R10.32 LEFT INGUINAL PAIN: Primary | ICD-10-CM

## 2023-01-11 DIAGNOSIS — E03.9 ACQUIRED HYPOTHYROIDISM: ICD-10-CM

## 2023-01-11 DIAGNOSIS — F31.4 BIPOLAR I DISORDER, MOST RECENT EPISODE DEPRESSED, SEVERE WITHOUT PSYCHOTIC FEATURES (HCC): ICD-10-CM

## 2023-01-11 DIAGNOSIS — E66.01 MORBID OBESITY WITH BMI OF 45.0-49.9, ADULT (HCC): ICD-10-CM

## 2023-01-11 PROCEDURE — G9899 SCRN MAM PERF RSLTS DOC: HCPCS | Performed by: INTERNAL MEDICINE

## 2023-01-11 PROCEDURE — G0463 HOSPITAL OUTPT CLINIC VISIT: HCPCS | Performed by: INTERNAL MEDICINE

## 2023-01-11 PROCEDURE — G8427 DOCREV CUR MEDS BY ELIG CLIN: HCPCS | Performed by: INTERNAL MEDICINE

## 2023-01-11 PROCEDURE — 3017F COLORECTAL CA SCREEN DOC REV: CPT | Performed by: INTERNAL MEDICINE

## 2023-01-11 PROCEDURE — G8417 CALC BMI ABV UP PARAM F/U: HCPCS | Performed by: INTERNAL MEDICINE

## 2023-01-11 PROCEDURE — 99214 OFFICE O/P EST MOD 30 MIN: CPT | Performed by: INTERNAL MEDICINE

## 2023-01-11 PROCEDURE — G9717 DOC PT DX DEP/BP F/U NT REQ: HCPCS | Performed by: INTERNAL MEDICINE

## 2023-01-11 RX ORDER — LEVOTHYROXINE SODIUM 25 UG/1
25 TABLET ORAL
Qty: 90 TABLET | Refills: 1 | Status: SHIPPED | COMMUNITY
Start: 2023-01-11

## 2023-01-11 NOTE — PROGRESS NOTES
Chief Complaint   Patient presents with    Medication Refill     Leg discomfort (leg)     Reviewed record in preparation for visit and have obtained necessary documentation. Identified pt with two pt identifiers(name and ).       Health Maintenance Due   Topic    Cervical cancer screen     Shingles Vaccine (1 of 2)    Colorectal Cancer Screening Combo     COVID-19 Vaccine (3 - Booster for Moderna series)    Flu Vaccine (1)         Chief Complaint   Patient presents with    Medication Refill     Leg discomfort (leg)        Wt Readings from Last 3 Encounters:   23 266 lb 9.6 oz (120.9 kg)   22 259 lb 12.8 oz (117.8 kg)   22 257 lb (116.6 kg)     Temp Readings from Last 3 Encounters:   23 97.1 °F (36.2 °C) (Temporal)   22 97.5 °F (36.4 °C) (Temporal)   22 97.1 °F (36.2 °C)     BP Readings from Last 3 Encounters:   23 130/80   22 120/80   22 110/80     Pulse Readings from Last 3 Encounters:   23 90   22 90   22 96           Learning Assessment:  :     Learning Assessment 10/18/2017 2014   PRIMARY LEARNER Patient Patient   HIGHEST LEVEL OF EDUCATION - PRIMARY LEARNER  > 4 YEARS OF COLLEGE > 4 YEARS OF COLLEGE   BARRIERS PRIMARY LEARNER NONE OTHER   CO-LEARNER CAREGIVER Yes No   CO-LEARNER NAME mark Winston -   CO-LEARNER HIGHEST LEVEL OF EDUCATION 4 YEARS OF COLLEGE -   1 Paramjit Hernandez -   PRIMARY LANGUAGE ENGLISH ENGLISH   PRIMARY LANGUAGE CO-LEARNER ENGLISH -    NEED No No   LEARNER PREFERENCE PRIMARY DEMONSTRATION DEMONSTRATION     - LISTENING   LEARNING SPECIAL TOPICS - no   ANSWERED BY self patient   RELATIONSHIP SELF SELF   ASSESSMENT COMMENT none n/a       Depression Screening:  :     3 most recent PHQ Screens 2023   Little interest or pleasure in doing things Several days   Feeling down, depressed, irritable, or hopeless Several days   Total Score PHQ 2 2   Trouble falling or staying asleep, or sleeping too much -   Feeling tired or having little energy -   Poor appetite, weight loss, or overeating -   Feeling bad about yourself - or that you are a failure or have let yourself or your family down -   Trouble concentrating on things such as school, work, reading, or watching TV -   Moving or speaking so slowly that other people could have noticed; or the opposite being so fidgety that others notice -   Thoughts of being better off dead, or hurting yourself in some way -   PHQ 9 Score -   How difficult have these problems made it for you to do your work, take care of your home and get along with others -       Fall Risk Assessment:  :     Fall Risk Assessment, last 12 mths 5/29/2019   Able to walk? Yes   Fall in past 12 months? No       Abuse Screening:  :     Abuse Screening Questionnaire 6/6/2022 6/6/2022 3/16/2022 10/4/2021 6/27/2017 8/6/2014   Do you ever feel afraid of your partner? N N N N N N   Are you in a relationship with someone who physically or mentally threatens you? N N N N N N   Is it safe for you to go home? Y Y Y Y Y Y       Coordination of Care Questionnaire:  :     1) Have you been to an emergency room, urgent care clinic since your last visit? no   Hospitalized since your last visit? no             2) Have you seen or consulted any other health care providers outside of [address] [place] [address] since your last visit? yes (Include any pap smears or colon screenings in this section.)    3) Do you have an Advance Directive on file? no    4) Are you interested in receiving information on Advance Directives? NO      Patient is accompanied by self I have received verbal consent from Yuko Dhillon to discuss any/all medical information while they are present in the room. Reviewed record  In preparation for visit and have obtained necessary documentation.

## 2023-01-11 NOTE — PROGRESS NOTES
Acute Care Note    Kisha Payne is 60 y.o. female. she presents for evaluation of Medication Refill (Leg discomfort (leg))      The patient has had left leg discomfort at the left leg since her nerve block after her knee surgery in 2021. She has not taken anything for this since that time. Additionally, the patient has not had any previous evaluation. She does note that the leg discomfort has seemed to improve over time. She is able to walk without difficulty. She has no significant sensory deficits. Prior to Admission medications    Medication Sig Start Date End Date Taking? Authorizing Provider   levothyroxine (SYNTHROID) 25 mcg tablet Take 1 Tablet by mouth nightly. 6/6/22  Yes Kathi Wiley MD   cholecalciferol, vitamin D3, 50 mcg (2,000 unit) tab Take 4,000 Units by mouth nightly. Yes Provider, Historical   venlafaxine (EFFEXOR) 75 mg tablet Take 75 mg by mouth daily. PT TAKES A TOTAL  mg EVERY MORNING   Yes Provider, Historical   venlafaxine-SR (EFFEXOR-XR) 150 mg capsule Take 150 mg by mouth daily. PT TAKES A TOTAL OF 225mg EVERY MORNING   Yes Provider, Historical   risperiDONE (RISPERDAL) 3 mg tablet Take 3 mg by mouth nightly. Yes Provider, Historical   cpap machine kit by Does Not Apply route. Dx 327.23 7/13/15  Yes Anam Jaime PA   TRAZODONE HCL (TRAZODONE PO) Take 50 mg by mouth nightly as needed. Yes Provider, Historical   lamoTRIgine (LaMICtal) 100 mg tablet Take 200 mg by mouth nightly.    Yes Provider, Historical         Patient Active Problem List   Diagnosis Code    NIKOLE (obstructive sleep apnea) G47.33    Bipolar I disorder, most recent episode depressed, severe without psychotic features ([facility] [address] F31.4    Pure hypercholesterolemia E78.00    Hypothyroidism E03.9    Vitamin D deficiency E55.9    GERD (gastroesophageal reflux disease) K21.9    OA (osteoarthritis) M19.90    DDD (degenerative disc disease), lumbosacral M51.37    Symptomatic cholelithiasis K80.20    Morbid obesity (HCC) E66.01    Localized osteoarthritis of both knees M17.0    Arthritis of right knee M17.11    Weight above 97th percentile Z78.9    Pain and swelling of right shoulder M25.511, M25.411    Bilateral chronic knee pain M25.561, M25.562, G89.29    Incomplete tear of right rotator cuff M75.111    Bilateral knee effusions M25.461, M25.462    Left foot pain M79.672    Bilateral sciatica M54.31, M54.32    Hip pain M25.559    Chronic renal disease, stage III N18.30         ROS  Noted discomfort at the leg and the left groin as per HPI     Visit Vitals  /80   Pulse 90   Temp 97.1 °F (36.2 °C) (Temporal)   Resp 16   Ht 5' 2\" (1.575 m)   Wt 266 lb 9.6 oz (120.9 kg)   SpO2 95%   BMI 48.76 kg/m²       Physical Exam  Constitutional:       Appearance: She is well-developed. Cardiovascular:      Rate and Rhythm: Normal rate and regular rhythm. Pulmonary:      Effort: Pulmonary effort is normal.      Breath sounds: Normal breath sounds. ASSESSMENT/PLAN  Diagnoses and all orders for this visit:    1. Left inguinal pain -discussed with the patient that she may have undergone some nerve injury secondary to the placement of her regional anesthetic and/or the catheter. I advised her to evaluate this with neurology. The patient will call for an appointment.  -     REFERRAL TO NEUROLOGY    2. Acquired hypothyroidism  -     levothyroxine (SYNTHROID) 25 mcg tablet; Take 1 Tablet by mouth nightly. 3. Morbid obesity with BMI of 45.0-49.9, adult ([address]    4. Bipolar I disorder, most recent episode depressed, severe without psychotic features ([address]       Advised the patient to call back or return to office if symptoms worsen/change/persist.   Discussed expected course/resolution/complications of diagnosis in detail with patient. Medication risks/benefits/costs/interactions/alternatives discussed with patient.    The patient was given an after visit summary which includes diagnoses, current medications, & vitals. They expressed understanding with the diagnosis and plan.

## 2023-02-27 ENCOUNTER — TELEPHONE (OUTPATIENT)
Dept: ORTHOPEDIC SURGERY | Age: 63
End: 2023-02-27

## 2023-02-27 NOTE — TELEPHONE ENCOUNTER
Patient called to request availability for right knee surgery, would like to schedule                            Forwarded to [facility] in scheduling for an appointment, patient needs to see Dr. Laith Gallegos to discuss surgery and have updated images taken

## 2023-03-09 ENCOUNTER — OFFICE VISIT (OUTPATIENT)
Dept: ORTHOPEDIC SURGERY | Age: 63
End: 2023-03-09

## 2023-03-09 VITALS — HEIGHT: 62 IN | WEIGHT: 264 LBS | BODY MASS INDEX: 48.58 KG/M2

## 2023-03-09 DIAGNOSIS — M17.11 PRIMARY OSTEOARTHRITIS OF RIGHT KNEE: Primary | ICD-10-CM

## 2023-03-09 NOTE — PROGRESS NOTES
Nain Weiner (: 1960) is a 60 y.o. female, patient, here for evaluation of the following chief complaint(s):  No chief complaint on file. HPI:    Patient and I have discussed in the past on multiple occasions her right knee. She has severe DJD right knee. Patient at this point can no longer function how she wants to and would like to proceed to a total knee replacement. On exam she has a painless right hip. Right lower extremity is sensate and perfused. Allergies   Allergen Reactions    Flagyl [Metronidazole] Other (comments)     HEADACHES AND MENTAL DISTRESS       Current Outpatient Medications   Medication Sig    levothyroxine (SYNTHROID) 25 mcg tablet Take 1 Tablet by mouth nightly. cholecalciferol, vitamin D3, 50 mcg (2,000 unit) tab Take 4,000 Units by mouth nightly. venlafaxine (EFFEXOR) 75 mg tablet Take 75 mg by mouth daily. PT TAKES A TOTAL  mg EVERY MORNING    venlafaxine-SR (EFFEXOR-XR) 150 mg capsule Take 150 mg by mouth daily. PT TAKES A TOTAL OF 225mg EVERY MORNING    risperiDONE (RISPERDAL) 3 mg tablet Take 3 mg by mouth nightly. cpap machine kit by Does Not Apply route. Dx 327.23    TRAZODONE HCL (TRAZODONE PO) Take 50 mg by mouth nightly as needed. lamoTRIgine (LaMICtal) 100 mg tablet Take 200 mg by mouth nightly. No current facility-administered medications for this visit. Past Medical History:   Diagnosis Date    Anxiety and depression     [facility]    Bipolar Disorder     [facility]    Chronic pain     LEFT KNEE    DDD (degenerative disc disease), lumbosacral     L5-S1 ([facility])    Depression     Fatty liver 2018    Foot fracture, right     h/o    Genital herpes     GERD (gastroesophageal reflux disease)     History of gastritis     Dr. Pa Carson    Hyperlipidemia     Hypothyroidism (acquired)     Menopause     LMP- 51years old?     OA (osteoarthritis)     knees, Dr. Sandee Hernandez    Obesity     NIKOLE on CPAP 2007    adherent with CPAP use    Symptomatic cholelithiasis 10/18/2017        Past Surgical History:   Procedure Laterality Date    HX COLONOSCOPY  2012    colon polyp, repeat in 5 yrs, Dr. Rock Llanes    HX ENDOSCOPY  2016    gastritis, hiatal hernia (Dr. Parker Duff)    C/ [address]  10/25/2021    HX KNEE REPLACEMENT  10/25/2021    HX LAP CHOLECYSTECTOMY  10/18/2018    Laparoscopic cholecystectomy by Dr. Morley Son.     HX OTHER SURGICAL      cyst removed from scalp - benign       Family History   Problem Relation Age of Onset    Heart Disease Mother     Pulmonary Embolism Mother     Lung Disease Father         emphysema, COPD, Asbestosis    Diabetes Father         onset with age    Heart Disease Father     COPD Father     Alcohol abuse Father     Seizures Brother     Mental Retardation Brother     Cancer Maternal Grandmother         ovarian    Diabetes Paternal Uncle     Diabetes Paternal Grandfather         type 2    Colon Cancer Other         materal grandmothers siblings x 3-4  of colon cancer    Celiac Disease Brother     Heart Disease Maternal Grandfather     Other Sister         PERIPHERAL ARTERY DISEASE    No Known Problems Sister     Anesth Problems Neg Hx         Social History     Socioeconomic History    Marital status: SINGLE     Spouse name: Not on file    Number of children: Not on file    Years of education: Not on file    Highest education level: Not on file   Occupational History    Not on file   Tobacco Use    Smoking status: Never    Smokeless tobacco: Never   Vaping Use    Vaping Use: Never used   Substance and Sexual Activity    Alcohol use: No    Drug use: No     Comment: prescriptions    Sexual activity: Not Currently     Partners: Male     Birth control/protection: Abstinence   Other Topics Concern    Not on file   Social History Narrative    Not on file     Social Determinants of Health     Financial Resource Strain: Not on file   Food Insecurity: Not on file Transportation Needs: Not on file   Physical Activity: Not on file   Stress: Not on file   Social Connections: Not on file   Intimate Partner Violence: Not on file   Housing Stability: Not on file       ROS    Positive for: Musculoskeletal  Last edited by Emerald Crowell on 3/9/2023  8:35 AM.            Vitals:  Ht 5' 2\" (1.575 m)   Wt 264 lb (119.7 kg)   BMI 48.29 kg/m²    Body mass index is 48.29 kg/m². PHYSICAL EXAM:  On exam today patient has varus alignment right knee. Right hip is painless. Right knee extends near full flexes to 120 degrees without instability. Pain is present along the medial joint line. IMAGING:  XR Results (most recent):  Results from Appointment encounter on 03/09/23    XR KNEE RT 3 V    Narrative  3 views of the patient's right knee. Severe medial compartment bone-on-bone large medial osteophytes subluxation tricompartmental changes. Stage IV DJD severe    We discussed continued conservative treatment measures versus total joint replacement. Symptoms have progressed despite conservative treatment measures outlined above and they desire to proceed with right total knee. Patient has had symptoms for over  months. They have decline in their activities of daily living with inability to walk long distances. There has been progressive decline in function. Discussed risks, benefits, and alternatives in detail, as well as anticipated hospital stay and course of rehabilitation. They will see their primary care physician prior to surgery. All questions answered. ASSESSMENT/PLAN:  1. Primary osteoarthritis of right knee  -     XR KNEE RT 3 V; Future    Right total knee    An electronic signature was used to authenticate this note.   --Andrew Tapia MD

## 2023-03-09 NOTE — LETTER
3/9/2023    Patient: Rose Hughes   YOB: 1960   Date of Visit: 3/9/2023     Dave Hannon MD  [address] [place] [phone]  Via In Basket    Dear Dave Hannon MD,      Thank you for referring Ms. Rose Hughes to [place] for evaluation. My notes for this consultation are attached. If you have questions, please do not hesitate to call me. I look forward to following your patient along with you.       Sincerely,    Jyothi Ferguson MD

## 2023-03-10 DIAGNOSIS — M17.11 PRIMARY OSTEOARTHRITIS OF RIGHT KNEE: Primary | ICD-10-CM

## 2023-04-05 ENCOUNTER — OFFICE VISIT (OUTPATIENT)
Dept: ORTHOPEDIC SURGERY | Age: 63
End: 2023-04-05
Payer: MEDICARE

## 2023-04-05 PROCEDURE — G9899 SCRN MAM PERF RSLTS DOC: HCPCS | Performed by: ORTHOPAEDIC SURGERY

## 2023-04-05 PROCEDURE — G8417 CALC BMI ABV UP PARAM F/U: HCPCS | Performed by: ORTHOPAEDIC SURGERY

## 2023-04-05 PROCEDURE — G9717 DOC PT DX DEP/BP F/U NT REQ: HCPCS | Performed by: ORTHOPAEDIC SURGERY

## 2023-04-05 PROCEDURE — 99213 OFFICE O/P EST LOW 20 MIN: CPT | Performed by: ORTHOPAEDIC SURGERY

## 2023-04-05 PROCEDURE — 3017F COLORECTAL CA SCREEN DOC REV: CPT | Performed by: ORTHOPAEDIC SURGERY

## 2023-04-05 PROCEDURE — G8427 DOCREV CUR MEDS BY ELIG CLIN: HCPCS | Performed by: ORTHOPAEDIC SURGERY

## 2023-04-05 NOTE — LETTER
4/5/2023    Patient: Roxanne Mims   YOB: 1960   Date of Visit: 4/5/2023     Lenny Rajput MD  [address] [place] [phone]  Via In Basket    Dear Lenny Rajput MD,      Thank you for referring Ms. Roxanne Mims to [facility] for evaluation. My notes for this consultation are attached. If you have questions, please do not hesitate to call me. I look forward to following your patient along with you.       Sincerely,    Navid Vázquez MD

## 2023-05-09 ENCOUNTER — HOSPITAL ENCOUNTER (OUTPATIENT)
Dept: PHYSICAL THERAPY | Facility: HOSPITAL | Age: 63
Setting detail: RECURRING SERIES
Discharge: HOME | End: 2023-05-12

## 2023-05-09 NOTE — THERAPY EVALUATION
Physical Therapy at [facility],   a part of [facility] [place]  [address] [address], [address] [place] [address], [address] [place] [address]  Phone: [phone]  Fax: [phone]           PHYSICAL THERAPY - MEDICARE EVALUATION/PLAN OF CARE NOTE (updated 3/23)      Date: 2023          Patient Name:  Sonya Jennings :  1960   Medical   Diagnosis:  No admission diagnoses are documented for this encounter. Treatment Diagnosis:  M25.561  RIGHT KNEE PAIN    Referral Source:  Hanh Read MD Provider #:  [id number]                Insurance: Payor: MEDICARE / Plan: MEDICARE PART A AND B / Product Type: *No Product type* /      Patient  verified yes     Visit #   Current  / Total 1 8   Time   In / Out 218 pm 319 pm   Total Treatment Time 61   Total Timed Codes 10   1:1 Treatment Time 10      [facility] Totals Reminder:  bill using total billable   min of TIMED therapeutic procedures and modalities. 8-22 min = 1 unit; 23-37 min = 2 units; 38-52 min = 3 units;  53-67 min = 4 units; 68-82 min = 5 units           SUBJECTIVE  Pain Level (0-10 scale): 3/10  [x]constant []intermittent [x]improving []worsening []no change since onset    Any medication changes, allergies to medications, adverse drug reactions, diagnosis change, or new procedure performed?: [x] No    [] Yes (see summary sheet for update)  Medications: Verified on Patient Summary List    Subjective functional status/changes:     Pt reports that she has had R knee pain for some time. She had a fall on 23 tripping in home and landing directly on R knee. Since fall she has had increased R knee pain and increased buckling of right knee. She is scheduled for R TKR in July. She does feel like her balance is off. Had L TKA Oct 2021 and did have PT following knee replacement as well as later on for balance. Pt has SPC and rollator that she uses intermittently using rollator if she has to go greater than 1 block or with walking dog.

## 2023-05-15 ENCOUNTER — HOSPITAL ENCOUNTER (OUTPATIENT)
Dept: PHYSICAL THERAPY | Facility: HOSPITAL | Age: 63
Setting detail: RECURRING SERIES
Discharge: HOME | End: 2023-05-18
Payer: MEDICARE

## 2023-05-15 PROCEDURE — 97016 VASOPNEUMATIC DEVICE THERAPY: CPT

## 2023-05-15 PROCEDURE — 97110 THERAPEUTIC EXERCISES: CPT

## 2023-05-15 PROCEDURE — 97112 NEUROMUSCULAR REEDUCATION: CPT

## 2023-05-15 PROCEDURE — 97140 MANUAL THERAPY 1/> REGIONS: CPT

## 2023-05-15 NOTE — PROGRESS NOTES
quad sets, glut squeezes   10 10 [id number] Manual Therapy (timed):  decrease pain, increase ROM, increase tissue extensibility, and decrease trigger points to improve patient's ability to progress to PLOF and address remaining functional goals. The manual therapy interventions were performed at a separate and distinct time from the therapeutic activities interventions . (see flow sheet as applicable)    Details if applicable:  STM/MFR to R gastroc and distal hamstring w/ and w/o myofascial stick             45 40    Total Total         Modalities Rationale:     decrease inflammation and decrease pain to improve patient's ability to progress to PLOF and address remaining functional goals. min [] Estim Unattended,             type/location:       []  w/ice    []  w/heat        min [] Estim Attended,             type/location:       []  w/ice   []  w/heat         []  w/US   []  TENS insruct            min []  Mechanical Traction,        type/lbs:        []  pro      []  sup           []  int       []  cont            []  before manual           []  after manual     min []  Ultrasound,         settings/location:      min  unbilled []  Ice     []  Heat            location/position:    15     min [x]  Vasopneumatic Device,      press/temp: moderate/ 38 deg   pre-treatment girth : 57.5   post-treatment girth : 56.5   measured at (landmark       location) :    If using vaso (only need to measure limb vaso being performed on)        min []  Other:      Skin assessment post-treatment (if applicable):    [x]  intact    []  redness- no adverse reaction                 []redness - adverse reaction:          [x]  Patient Education billed concurrently with other procedures   [x] Review HEP    [] Progressed/Changed HEP, detail:    [] Other detail:         Other Objective/Functional Measures      Pain Level at end of session (0-10 scale): 3/10      Assessment   Patient tolerated manual therapy well and reported improvement in pain
HEMATURIA

## 2023-05-18 ENCOUNTER — HOSPITAL ENCOUNTER (OUTPATIENT)
Dept: PHYSICAL THERAPY | Facility: HOSPITAL | Age: 63
Setting detail: RECURRING SERIES
End: 2023-05-18
Payer: MEDICARE

## 2023-05-22 ENCOUNTER — HOSPITAL ENCOUNTER (OUTPATIENT)
Dept: PHYSICAL THERAPY | Facility: HOSPITAL | Age: 63
Setting detail: RECURRING SERIES
Discharge: HOME | End: 2023-05-25
Payer: MEDICARE

## 2023-05-22 PROCEDURE — 97016 VASOPNEUMATIC DEVICE THERAPY: CPT

## 2023-05-22 PROCEDURE — 97112 NEUROMUSCULAR REEDUCATION: CPT

## 2023-05-22 PROCEDURE — 97110 THERAPEUTIC EXERCISES: CPT

## 2023-05-22 PROCEDURE — 97140 MANUAL THERAPY 1/> REGIONS: CPT

## 2023-05-22 NOTE — PROGRESS NOTES
PHYSICAL THERAPY - MEDICARE DAILY TREATMENT NOTE (updated 3/23)      Date: 2023          Patient Name:  Jovanny Huffman :  1960   Medical   Diagnosis:  No admission diagnoses are documented for this encounter. Treatment Diagnosis:  M25.561  RIGHT KNEE PAIN    Referral Source:  Kenneth Randolph MD Insurance:   Payor: MEDICARE / Plan: MEDICARE PART A AND B / Product Type: *No Product type* /                     Patient  verified yes     Visit #   Current  / Total 3 8   Time   In / Out 1415 1515   Total Treatment Time 60   Total Timed Codes 50   1:1 Treatment Time 39      [facility] Totals Reminder:  bill using total billable   min of TIMED therapeutic procedures and modalities. 8-22 min = 1 unit; 23-37 min = 2 units; 38-52 min = 3 units; 53-67 min = 4 units; 68-82 min = 5 units            SUBJECTIVE    Pain Level (0-10 scale): 4-4.5/10    Any medication changes, allergies to medications, adverse drug reactions, diagnosis change, or new procedure performed?: [x] No    [] Yes (see summary sheet for update)  Medications: Verified on Patient Summary List    Subjective functional status/changes:     I just have a lot going on at home right now. OBJECTIVE      Therapeutic Procedures: Tx Min Billable or 1:1 Min (if diff from Tx Min) Procedure, Rationale, Specifics   28 23 59411 Therapeutic Exercise (timed):  increase ROM, strength, coordination, balance, and proprioception to improve patient's ability to progress to PLOF and address remaining functional goals. (see flow sheet as applicable)     Details if applicable:     12 12 08808 Neuromuscular Re-Education (timed):  improve balance, coordination, kinesthetic sense, posture, core stability and proprioception to improve patient's ability to develop conscious control of individual muscles and awareness of position of extremities in order to progress to PLOF and address remaining functional goals.  (see flow sheet as applicable)     Details if

## 2023-05-25 ENCOUNTER — APPOINTMENT (OUTPATIENT)
Dept: PHYSICAL THERAPY | Facility: HOSPITAL | Age: 63
End: 2023-05-25
Payer: MEDICARE

## 2023-05-26 ENCOUNTER — TELEPHONE (OUTPATIENT)
Age: 63
End: 2023-05-26

## 2023-05-30 ENCOUNTER — APPOINTMENT (OUTPATIENT)
Dept: PHYSICAL THERAPY | Facility: HOSPITAL | Age: 63
End: 2023-05-30
Payer: MEDICARE

## 2023-05-31 ENCOUNTER — TELEPHONE (OUTPATIENT)
Age: 63
End: 2023-05-31

## 2023-05-31 NOTE — TELEPHONE ENCOUNTER
Priscilla with [facility] [phone]     Returned Liberty's call concerning Dr. Sekou Pierre dental protocol after surgery. It is 2 years, so patient will need an antibiotic for her upcoming dental appt. They will send it in to patient's pharmacy and call her to review the protocol.

## 2023-05-31 NOTE — TELEPHONE ENCOUNTER
Patient had total right knee replacement 10/2021. She said  on her papers it stated antibiotics will be needed before any dental procedures. Her procedure has been rescheduled to June 8 , 2023.

## 2023-05-31 NOTE — TELEPHONE ENCOUNTER
PC to DR Greenwood office to return a call regarding antibiotics before dental procedures. Patient had knee replacement 2021 and stated she needed antibiotics before procedure.

## 2023-06-01 ENCOUNTER — APPOINTMENT (OUTPATIENT)
Dept: PHYSICAL THERAPY | Facility: HOSPITAL | Age: 63
End: 2023-06-01
Payer: MEDICARE

## 2023-06-19 ENCOUNTER — HOSPITAL ENCOUNTER (OUTPATIENT)
Dept: PHYSICAL THERAPY | Facility: HOSPITAL | Age: 63
Setting detail: RECURRING SERIES
End: 2023-06-19
Payer: MEDICARE

## 2023-06-19 ENCOUNTER — TRANSCRIBE ORDERS (OUTPATIENT)
Facility: HOSPITAL | Age: 63
End: 2023-06-19

## 2023-06-19 DIAGNOSIS — Z12.31 VISIT FOR SCREENING MAMMOGRAM: Primary | ICD-10-CM

## 2023-06-23 ENCOUNTER — HOSPITAL ENCOUNTER (OUTPATIENT)
Dept: PHYSICAL THERAPY | Facility: HOSPITAL | Age: 63
Setting detail: RECURRING SERIES
Discharge: HOME | End: 2023-06-26
Payer: MEDICARE

## 2023-06-23 PROCEDURE — 97140 MANUAL THERAPY 1/> REGIONS: CPT

## 2023-06-23 PROCEDURE — 97112 NEUROMUSCULAR REEDUCATION: CPT

## 2023-06-23 PROCEDURE — 97016 VASOPNEUMATIC DEVICE THERAPY: CPT

## 2023-06-23 PROCEDURE — 97110 THERAPEUTIC EXERCISES: CPT

## 2023-06-26 ENCOUNTER — HOSPITAL ENCOUNTER (OUTPATIENT)
Dept: PHYSICAL THERAPY | Facility: HOSPITAL | Age: 63
Setting detail: RECURRING SERIES
Discharge: HOME | End: 2023-06-29
Payer: MEDICARE

## 2023-06-26 PROCEDURE — 97110 THERAPEUTIC EXERCISES: CPT

## 2023-06-26 PROCEDURE — 97016 VASOPNEUMATIC DEVICE THERAPY: CPT

## 2023-06-26 PROCEDURE — 97112 NEUROMUSCULAR REEDUCATION: CPT

## 2023-06-28 ENCOUNTER — HOSPITAL ENCOUNTER (OUTPATIENT)
Dept: PHYSICAL THERAPY | Facility: HOSPITAL | Age: 63
Setting detail: RECURRING SERIES
Discharge: HOME | End: 2023-07-01
Payer: MEDICARE

## 2023-06-28 PROCEDURE — 97110 THERAPEUTIC EXERCISES: CPT

## 2023-06-28 PROCEDURE — 97112 NEUROMUSCULAR REEDUCATION: CPT

## 2023-06-28 PROCEDURE — 97016 VASOPNEUMATIC DEVICE THERAPY: CPT

## 2023-07-03 ENCOUNTER — HOSPITAL ENCOUNTER (OUTPATIENT)
Dept: PHYSICAL THERAPY | Facility: HOSPITAL | Age: 63
Setting detail: RECURRING SERIES
Discharge: HOME | End: 2023-07-06
Payer: MEDICARE

## 2023-07-03 PROCEDURE — 97112 NEUROMUSCULAR REEDUCATION: CPT

## 2023-07-03 PROCEDURE — 97016 VASOPNEUMATIC DEVICE THERAPY: CPT

## 2023-07-03 PROCEDURE — 97110 THERAPEUTIC EXERCISES: CPT

## 2023-07-03 NOTE — PROGRESS NOTES
PHYSICAL THERAPY - MEDICARE DAILY TREATMENT NOTE (updated 3/23)      Date: 7/3/2023          Patient Name:  Jayy Alfred :  1960   Medical   Diagnosis:  No admission diagnoses are documented for this encounter. Treatment Diagnosis:  M25.561  RIGHT KNEE PAIN    Referral Source:  Dexter Godwin MD Insurance:   Payor: MEDICARE / Plan: MEDICARE PART A AND B / Product Type: *No Product type* /                     Patient  verified yes     Visit #   Current  / Total 8 8   Time   In / Out 1414 1515   Total Treatment Time 61   Total Timed Codes 46   1:1 Treatment Time 39      [facility] Totals Reminder:  bill using total billable   min of TIMED therapeutic procedures and modalities. 8-22 min = 1 unit; 23-37 min = 2 units; 38-52 min = 3 units; 53-67 min = 4 units; 68-82 min = 5 units            SUBJECTIVE     Pain Level (0-10 scale): 1-2/10     Any medication changes, allergies to medications, adverse drug reactions, diagnosis change, or new procedure performed?: [x] No    [] Yes (see summary sheet for update)  Medications: Verified on Patient Summary List     Subjective functional status/changes: \"All weekend it was mostly in my knee joint but today it is back behind my knee. \"     OBJECTIVE        Therapeutic Procedures: Tx Min Billable or 1:1 Min (if diff from Tx Min) Procedure, Rationale, Specifics   31 26 17273 Therapeutic Exercise (timed):  increase ROM, strength, coordination, balance, and proprioception to improve patient's ability to progress to PLOF and address remaining functional goals. (see flow sheet as applicable)      Details if applicable:     15 15 [id number] Neuromuscular Re-Education (timed):  improve balance, coordination, kinesthetic sense, posture, core stability and proprioception to improve patient's ability to develop conscious control of individual muscles and awareness of position of extremities in order to progress to PLOF and address remaining functional goals.  (see flow

## 2023-07-11 ENCOUNTER — HOSPITAL ENCOUNTER (OUTPATIENT)
Dept: PHYSICAL THERAPY | Facility: HOSPITAL | Age: 63
Setting detail: RECURRING SERIES
Discharge: HOME | End: 2023-07-14
Payer: MEDICARE

## 2023-07-11 PROCEDURE — 97016 VASOPNEUMATIC DEVICE THERAPY: CPT

## 2023-07-11 PROCEDURE — 97110 THERAPEUTIC EXERCISES: CPT

## 2023-07-11 PROCEDURE — 97112 NEUROMUSCULAR REEDUCATION: CPT

## 2023-07-11 NOTE — PROGRESS NOTES
PHYSICAL THERAPY - MEDICARE DAILY TREATMENT NOTE (updated 3/23)      Date: 2023          Patient Name:  Miracle Brock :  1960   Medical   Diagnosis:  No admission diagnoses are documented for this encounter. Treatment Diagnosis:  M25.561  RIGHT KNEE PAIN    Referral Source:  Becki Curran MD Insurance:   Payor: MEDICARE / Plan: MEDICARE PART A AND B / Product Type: *No Product type* /                     Patient  verified yes     Visit #   Current  / Total 9 8   Time   In / Out 1100 1205   Total Treatment Time 65   Total Timed Codes 50   1:1 Treatment Time 39      [facility] Totals Reminder:  bill using total billable   min of TIMED therapeutic procedures and modalities. 8-22 min = 1 unit; 23-37 min = 2 units; 38-52 min = 3 units; 53-67 min = 4 units; 68-82 min = 5 units            SUBJECTIVE     Pain Level (0-10 scale): 1-2/10     Any medication changes, allergies to medications, adverse drug reactions, diagnosis change, or new procedure performed?: [x] No    [] Yes (see summary sheet for update)  Medications: Verified on Patient Summary List     Subjective functional status/changes: \"My mother wasn't well so I wasn't able to do much over the weekend. \"     OBJECTIVE        Therapeutic Procedures: Tx Min Billable or 1:1 Min (if diff from Tx Min) Procedure, Rationale, Specifics   35 30 57839 Therapeutic Exercise (timed):  increase ROM, strength, coordination, balance, and proprioception to improve patient's ability to progress to PLOF and address remaining functional goals. (see flow sheet as applicable)      Details if applicable:  reassessment   15 15 38739 Neuromuscular Re-Education (timed):  improve balance, coordination, kinesthetic sense, posture, core stability and proprioception to improve patient's ability to develop conscious control of individual muscles and awareness of position of extremities in order to progress to PLOF and address remaining functional goals.  (see flow

## 2023-07-11 NOTE — PROGRESS NOTES
Physical Therapy at [facility],   a part of [address] [place] [address] Highway [facility], [address] [facility]  Phone: [phone]  Fax: [phone]      DISCHARGE SUMMARY  Patient Name: Gregory Viveros : 1960   Treatment/Medical Diagnosis: No admission diagnoses are documented for this encounter. Referral Source: Na Jacobsen MD     Date of Initial Visit: 23 Attended Visits: 9 Missed Visits: 2     SUMMARY OF TREATMENT  Overall, patient has made good progress with skilled physical therapy. She demonstrates improvement in R knee AROM and has made significant gains in hip and LE strength and balance. She is currently scheduled for R knee replacement on 2023. At this time, she will be discharged to her [facility] and patient is in agreement with this plan.      CURRENT STATUS  FOTO score: 43/100 (35)   R Knee AROM: -8-95 deg (-15-84 at Initial evaluation)    LOWER QUARTER                            MUSCLE STRENGTH  KEY                                                                             R                      L  0 - No Contraction                   Knee ext                      5                      5  1 - Trace                                           flex                     5                      5  2 - Poor                                   Hip ext                         4                      4  3 - Fair                                           flex                        4                     4  4 - Good                                        abd                        4                    4  5 - Normal                                     add                        4+                    4+                                                  Ankle DF                     5                      5                                                            PF                     4+                     4+    Short and Long

## 2023-07-19 ENCOUNTER — APPOINTMENT (OUTPATIENT)
Dept: PHYSICAL THERAPY | Facility: HOSPITAL | Age: 63
End: 2023-07-19
Payer: MEDICARE

## 2023-07-25 ENCOUNTER — APPOINTMENT (OUTPATIENT)
Dept: PHYSICAL THERAPY | Facility: HOSPITAL | Age: 63
End: 2023-07-25
Payer: MEDICARE

## 2023-07-31 ENCOUNTER — HOSPITAL ENCOUNTER (OUTPATIENT)
Facility: HOSPITAL | Age: 63
Discharge: HOME | End: 2023-08-03
Attending: OBSTETRICS & GYNECOLOGY
Payer: MEDICARE

## 2023-07-31 DIAGNOSIS — N64.4 MASTODYNIA: ICD-10-CM

## 2023-07-31 PROCEDURE — 77063 BREAST TOMOSYNTHESIS BI: CPT

## 2023-08-10 NOTE — H&P
Antionette Lemus (: 1960) is a 60 y.o. female, patient, here for evaluation of the following chief complaint(s):  Knee Pain (Right knee )        HPI:     John Hutchison on right arthritic knee. Wanted to ensure that she did not break anything so presents today for x-rays. Chief complaint is right knee pain. Allergies   Allergen Reactions    Flagyl [Metronidazole] Other (comments)       HEADACHES AND MENTAL DISTRESS              Current Outpatient Medications   Medication Sig    levothyroxine (SYNTHROID) 25 mcg tablet Take 1 Tablet by mouth nightly. cholecalciferol, vitamin D3, 50 mcg (2,000 unit) tab Take 4,000 Units by mouth nightly. venlafaxine (EFFEXOR) 75 mg tablet Take 75 mg by mouth daily. PT TAKES A TOTAL  mg EVERY MORNING    venlafaxine-SR (EFFEXOR-XR) 150 mg capsule Take 150 mg by mouth daily. PT TAKES A TOTAL OF 225mg EVERY MORNING    risperiDONE (RISPERDAL) 3 mg tablet Take 3 mg by mouth nightly. cpap machine kit by Does Not Apply route. Dx 327.23    TRAZODONE HCL (TRAZODONE PO) Take 50 mg by mouth nightly as needed. lamoTRIgine (LaMICtal) 100 mg tablet Take 200 mg by mouth nightly. No current facility-administered medications for this visit. Past Medical History:   Diagnosis Date    Anxiety and depression       [facility]    Bipolar Disorder       [facility]    Chronic pain       LEFT KNEE    DDD (degenerative disc disease), lumbosacral       L5-S1 ([facility])    Depression      Fatty liver 2018    Foot fracture, right       h/o    Genital herpes      GERD (gastroesophageal reflux disease)      History of gastritis       Dr. Kaylee Sarabia    Hyperlipidemia      Hypothyroidism (acquired)      Menopause       LMP- 51years old?     OA (osteoarthritis)       knees, Dr. Cole Marcial    Obesity      HOLLY on CPAP      adherent with CPAP use    Symptomatic cholelithiasis 10/18/2017               Past Surgical History:   Procedure

## 2023-08-15 ENCOUNTER — OFFICE VISIT (OUTPATIENT)
Age: 63
End: 2023-08-15
Payer: MEDICARE

## 2023-08-15 VITALS
BODY MASS INDEX: 49.58 KG/M2 | RESPIRATION RATE: 16 BRPM | HEIGHT: 62 IN | TEMPERATURE: 97.8 F | DIASTOLIC BLOOD PRESSURE: 84 MMHG | HEART RATE: 88 BPM | SYSTOLIC BLOOD PRESSURE: 146 MMHG | OXYGEN SATURATION: 96 % | WEIGHT: 269.4 LBS

## 2023-08-15 DIAGNOSIS — E03.9 ACQUIRED HYPOTHYROIDISM: ICD-10-CM

## 2023-08-15 DIAGNOSIS — Z01.818 PREOP EXAM FOR INTERNAL MEDICINE: Primary | ICD-10-CM

## 2023-08-15 PROCEDURE — 99214 OFFICE O/P EST MOD 30 MIN: CPT | Performed by: INTERNAL MEDICINE

## 2023-08-15 PROCEDURE — 1036F TOBACCO NON-USER: CPT | Performed by: INTERNAL MEDICINE

## 2023-08-15 PROCEDURE — G8428 CUR MEDS NOT DOCUMENT: HCPCS | Performed by: INTERNAL MEDICINE

## 2023-08-15 PROCEDURE — G8419 CALC BMI OUT NRM PARAM NOF/U: HCPCS | Performed by: INTERNAL MEDICINE

## 2023-08-15 PROCEDURE — 3017F COLORECTAL CA SCREEN DOC REV: CPT | Performed by: INTERNAL MEDICINE

## 2023-08-15 RX ORDER — LEVOTHYROXINE SODIUM 0.03 MG/1
25 TABLET ORAL NIGHTLY
Qty: 90 TABLET | Refills: 1 | Status: SHIPPED | OUTPATIENT
Start: 2023-08-15

## 2023-08-15 NOTE — PROGRESS NOTES
Preoperative Asessment    Erickson Herrmann is 58 y.o. female (1960) who presents for preoperative evaluation. Procedure/Surgery: Right knee replacement   Date of Procedure/Surgery: 8/29/23  Surgeon: Dr. Ralf Monahan: [facility]  Primary Physician: Ivett Najera. MD    The patient reports feeling well today. She has no acute complaints other than her right knee pain. She is stable on medications and not having any issues. Patient Active Problem List   Diagnosis    Arthritis of right knee    Bilateral sciatica    GERD (gastroesophageal reflux disease)    Weight above 97th percentile    Pain and swelling of right shoulder    Hip pain    Left foot pain    Hypothyroidism    Localized osteoarthritis of both knees    Pure hypercholesterolemia    HOLLY (obstructive sleep apnea)    Bilateral knee effusions    DDD (degenerative disc disease), lumbosacral    Bipolar I disorder, most recent episode depressed, severe without psychotic features ([address])    Incomplete tear of right rotator cuff    Symptomatic cholelithiasis    Morbid obesity ([address])    Vitamin D deficiency    Bilateral chronic knee pain    OA (osteoarthritis)       Prior to Admission medications    Medication Sig Start Date End Date Taking? Authorizing Provider   cephALEXin (KEFLEX) 500 MG capsule Take 4 tablets one hour prior to any dental procedure including cleanings.  5/31/23  Yes Emy Martinez PA-C   Cholecalciferol 50 MCG (2000 UT) TABS Take 2 tablets by mouth   Yes Ar Automatic Reconciliation   lamoTRIgine (LAMICTAL) 100 MG tablet Take 2 tablets by mouth nightly   Yes Ar Automatic Reconciliation   levothyroxine (SYNTHROID) 25 MCG tablet Take 1 tablet by mouth nightly 1/11/23  Yes Ar Automatic Reconciliation   risperiDONE (RISPERDAL) 3 MG tablet Take 1 tablet by mouth nightly   Yes Ar Automatic Reconciliation   venlafaxine (EFFEXOR) 75 MG tablet Take 1 tablet by mouth daily   Yes Ar Automatic

## 2023-08-22 ENCOUNTER — HOSPITAL ENCOUNTER (OUTPATIENT)
Facility: HOSPITAL | Age: 63
Discharge: HOME | End: 2023-08-25
Payer: MEDICARE

## 2023-08-22 VITALS
HEART RATE: 76 BPM | HEIGHT: 62 IN | DIASTOLIC BLOOD PRESSURE: 80 MMHG | WEIGHT: 267.4 LBS | BODY MASS INDEX: 49.21 KG/M2 | SYSTOLIC BLOOD PRESSURE: 141 MMHG | TEMPERATURE: 97.7 F

## 2023-08-22 LAB
ABO + RH BLD: NORMAL
ANION GAP SERPL CALC-SCNC: 4 MMOL/L (ref 5–15)
APPEARANCE UR: CLEAR
BACTERIA URNS QL MICRO: NEGATIVE /HPF
BILIRUB UR QL: NEGATIVE
BLOOD GROUP ANTIBODIES SERPL: NORMAL
BUN SERPL-MCNC: 12 MG/DL (ref 6–20)
BUN/CREAT SERPL: 12 (ref 12–20)
CALCIUM SERPL-MCNC: 8.5 MG/DL (ref 8.5–10.1)
CHLORIDE SERPL-SCNC: 106 MMOL/L (ref 97–108)
CO2 SERPL-SCNC: 28 MMOL/L (ref 21–32)
COLOR UR: NORMAL
CREAT SERPL-MCNC: 0.99 MG/DL (ref 0.55–1.02)
EPITH CASTS URNS QL MICRO: NORMAL /LPF
ERYTHROCYTE [DISTWIDTH] IN BLOOD BY AUTOMATED COUNT: 13.7 % (ref 11.5–14.5)
EST. AVERAGE GLUCOSE BLD GHB EST-MCNC: 103 MG/DL
GLUCOSE SERPL-MCNC: 149 MG/DL (ref 65–100)
GLUCOSE UR STRIP.AUTO-MCNC: NEGATIVE MG/DL
HBA1C MFR BLD: 5.2 % (ref 4–5.6)
HCT VFR BLD AUTO: 39.2 % (ref 35–47)
HGB BLD-MCNC: 12.4 G/DL (ref 11.5–16)
HGB UR QL STRIP: NEGATIVE
HYALINE CASTS URNS QL MICRO: NORMAL /LPF (ref 0–5)
INR PPP: 1 (ref 0.9–1.1)
KETONES UR QL STRIP.AUTO: NEGATIVE MG/DL
LEUKOCYTE ESTERASE UR QL STRIP.AUTO: NEGATIVE
MCH RBC QN AUTO: 29 PG (ref 26–34)
MCHC RBC AUTO-ENTMCNC: 31.6 G/DL (ref 30–36.5)
MCV RBC AUTO: 91.8 FL (ref 80–99)
NITRITE UR QL STRIP.AUTO: NEGATIVE
NRBC # BLD: 0 K/UL (ref 0–0.01)
NRBC BLD-RTO: 0 PER 100 WBC
PH UR STRIP: 5.5 (ref 5–8)
PLATELET # BLD AUTO: 197 K/UL (ref 150–400)
PMV BLD AUTO: 10.5 FL (ref 8.9–12.9)
POTASSIUM SERPL-SCNC: 4 MMOL/L (ref 3.5–5.1)
PROT UR STRIP-MCNC: NEGATIVE MG/DL
PROTHROMBIN TIME: 10.4 SEC (ref 9–11.1)
RBC # BLD AUTO: 4.27 M/UL (ref 3.8–5.2)
RBC #/AREA URNS HPF: NORMAL /HPF (ref 0–5)
SODIUM SERPL-SCNC: 138 MMOL/L (ref 136–145)
SP GR UR REFRACTOMETRY: 1 (ref 1–1.03)
SPECIMEN EXP DATE BLD: NORMAL
URINE CULTURE IF INDICATED: NORMAL
UROBILINOGEN UR QL STRIP.AUTO: 0.2 EU/DL (ref 0.2–1)
WBC # BLD AUTO: 6.5 K/UL (ref 3.6–11)
WBC URNS QL MICRO: NORMAL /HPF (ref 0–4)

## 2023-08-22 PROCEDURE — 86850 RBC ANTIBODY SCREEN: CPT

## 2023-08-22 PROCEDURE — 81001 URINALYSIS AUTO W/SCOPE: CPT

## 2023-08-22 PROCEDURE — 85610 PROTHROMBIN TIME: CPT

## 2023-08-22 PROCEDURE — 83036 HEMOGLOBIN GLYCOSYLATED A1C: CPT

## 2023-08-22 PROCEDURE — 85027 COMPLETE CBC AUTOMATED: CPT

## 2023-08-22 PROCEDURE — 86900 BLOOD TYPING SEROLOGIC ABO: CPT

## 2023-08-22 PROCEDURE — NSP99 NSP99 NON-BILLABLE CODE: Performed by: NURSE PRACTITIONER

## 2023-08-22 PROCEDURE — 80048 BASIC METABOLIC PNL TOTAL CA: CPT

## 2023-08-22 PROCEDURE — 36415 COLL VENOUS BLD VENIPUNCTURE: CPT

## 2023-08-22 PROCEDURE — 86901 BLOOD TYPING SEROLOGIC RH(D): CPT

## 2023-08-22 RX ORDER — TRAZODONE HYDROCHLORIDE 50 MG/1
50 TABLET ORAL
COMMUNITY

## 2023-08-22 ASSESSMENT — PAIN DESCRIPTION - DESCRIPTORS: DESCRIPTORS: ACHING;DISCOMFORT

## 2023-08-22 ASSESSMENT — PAIN DESCRIPTION - ORIENTATION: ORIENTATION: RIGHT

## 2023-08-22 ASSESSMENT — PAIN DESCRIPTION - LOCATION: LOCATION: LEG;HIP;KNEE

## 2023-08-22 ASSESSMENT — PAIN SCALES - GENERAL: PAINLEVEL_OUTOF10: 5

## 2023-08-22 ASSESSMENT — PAIN DESCRIPTION - PAIN TYPE: TYPE: CHRONIC PAIN

## 2023-08-22 ASSESSMENT — PAIN DESCRIPTION - ONSET: ONSET: ON-GOING

## 2023-08-22 ASSESSMENT — PAIN DESCRIPTION - FREQUENCY: FREQUENCY: CONTINUOUS

## 2023-08-22 ASSESSMENT — PAIN - FUNCTIONAL ASSESSMENT: PAIN_FUNCTIONAL_ASSESSMENT: PREVENTS OR INTERFERES SOME ACTIVE ACTIVITIES AND ADLS

## 2023-08-23 LAB
BACTERIA SPEC CULT: NORMAL
BACTERIA SPEC CULT: NORMAL
SERVICE CMNT-IMP: NORMAL

## 2023-08-25 PROBLEM — Z01.818 ENCOUNTER FOR PREADMISSION TESTING: Status: ACTIVE | Noted: 2023-08-25

## 2023-08-25 LAB
EKG ATRIAL RATE: 86 BPM
EKG DIAGNOSIS: NORMAL
EKG P AXIS: 58 DEGREES
EKG P-R INTERVAL: 230 MS
EKG Q-T INTERVAL: 354 MS
EKG QRS DURATION: 78 MS
EKG QTC CALCULATION (BAZETT): 423 MS
EKG R AXIS: 14 DEGREES
EKG T AXIS: 56 DEGREES
EKG VENTRICULAR RATE: 86 BPM

## 2023-08-29 ENCOUNTER — ANESTHESIA (OUTPATIENT)
Facility: HOSPITAL | Age: 63
End: 2023-08-29
Payer: MEDICARE

## 2023-08-29 ENCOUNTER — ANESTHESIA EVENT (OUTPATIENT)
Facility: HOSPITAL | Age: 63
End: 2023-08-29
Payer: MEDICARE

## 2023-08-29 ENCOUNTER — HOSPITAL ENCOUNTER (INPATIENT)
Facility: HOSPITAL | Age: 63
LOS: 3 days | Discharge: NURSING, REHAB OR LONG-TERM CARE | End: 2023-09-01
Attending: ORTHOPAEDIC SURGERY | Admitting: ORTHOPAEDIC SURGERY
Payer: MEDICARE

## 2023-08-29 ENCOUNTER — APPOINTMENT (OUTPATIENT)
Facility: HOSPITAL | Age: 63
End: 2023-08-29
Attending: ORTHOPAEDIC SURGERY
Payer: MEDICARE

## 2023-08-29 DIAGNOSIS — Z96.651 S/P TOTAL KNEE ARTHROPLASTY, RIGHT: Primary | ICD-10-CM

## 2023-08-29 LAB
GLUCOSE BLD STRIP.AUTO-MCNC: 121 MG/DL (ref 65–117)
SERVICE CMNT-IMP: ABNORMAL

## 2023-08-29 PROCEDURE — 82962 GLUCOSE BLOOD TEST: CPT

## 2023-08-29 PROCEDURE — 3600000015 HC SURGERY LEVEL 5 ADDTL 15MIN: Performed by: ORTHOPAEDIC SURGERY

## 2023-08-29 PROCEDURE — 97161 PT EVAL LOW COMPLEX 20 MIN: CPT

## 2023-08-29 PROCEDURE — G0378 HOSPITAL OBSERVATION PER HR: HCPCS

## 2023-08-29 PROCEDURE — 3700000001 HC ADD 15 MINUTES (ANESTHESIA): Performed by: ORTHOPAEDIC SURGERY

## 2023-08-29 PROCEDURE — 27447 TOTAL KNEE ARTHROPLASTY: CPT | Performed by: ORTHOPAEDIC SURGERY

## 2023-08-29 PROCEDURE — 2500000003 HC RX 250 WO HCPCS

## 2023-08-29 PROCEDURE — C1713 ANCHOR/SCREW BN/BN,TIS/BN: HCPCS | Performed by: ORTHOPAEDIC SURGERY

## 2023-08-29 PROCEDURE — 2580000003 HC RX 258: Performed by: ANESTHESIOLOGY

## 2023-08-29 PROCEDURE — 6360000002 HC RX W HCPCS

## 2023-08-29 PROCEDURE — 7100000001 HC PACU RECOVERY - ADDTL 15 MIN: Performed by: ORTHOPAEDIC SURGERY

## 2023-08-29 PROCEDURE — 6360000002 HC RX W HCPCS: Performed by: ANESTHESIOLOGY

## 2023-08-29 PROCEDURE — 1100000000 HC RM PRIVATE

## 2023-08-29 PROCEDURE — 3600000005 HC SURGERY LEVEL 5 BASE: Performed by: ORTHOPAEDIC SURGERY

## 2023-08-29 PROCEDURE — 6360000002 HC RX W HCPCS: Performed by: ORTHOPAEDIC SURGERY

## 2023-08-29 PROCEDURE — 97116 GAIT TRAINING THERAPY: CPT

## 2023-08-29 PROCEDURE — 0SRC0J9 REPLACEMENT OF RIGHT KNEE JOINT WITH SYNTHETIC SUBSTITUTE, CEMENTED, OPEN APPROACH: ICD-10-PCS | Performed by: ORTHOPAEDIC SURGERY

## 2023-08-29 PROCEDURE — 7100000000 HC PACU RECOVERY - FIRST 15 MIN: Performed by: ORTHOPAEDIC SURGERY

## 2023-08-29 PROCEDURE — C1776 JOINT DEVICE (IMPLANTABLE): HCPCS | Performed by: ORTHOPAEDIC SURGERY

## 2023-08-29 PROCEDURE — 6370000000 HC RX 637 (ALT 250 FOR IP): Performed by: ANESTHESIOLOGY

## 2023-08-29 PROCEDURE — 2580000003 HC RX 258: Performed by: ORTHOPAEDIC SURGERY

## 2023-08-29 PROCEDURE — 6370000000 HC RX 637 (ALT 250 FOR IP): Performed by: ORTHOPAEDIC SURGERY

## 2023-08-29 PROCEDURE — 2580000003 HC RX 258: Performed by: PHYSICIAN ASSISTANT

## 2023-08-29 PROCEDURE — 6370000000 HC RX 637 (ALT 250 FOR IP)

## 2023-08-29 PROCEDURE — C9290 INJ, BUPIVACAINE LIPOSOME: HCPCS | Performed by: ORTHOPAEDIC SURGERY

## 2023-08-29 PROCEDURE — 97530 THERAPEUTIC ACTIVITIES: CPT

## 2023-08-29 PROCEDURE — 2720000010 HC SURG SUPPLY STERILE: Performed by: ORTHOPAEDIC SURGERY

## 2023-08-29 PROCEDURE — 3700000000 HC ANESTHESIA ATTENDED CARE: Performed by: ORTHOPAEDIC SURGERY

## 2023-08-29 PROCEDURE — 64447 NJX AA&/STRD FEMORAL NRV IMG: CPT | Performed by: ANESTHESIOLOGY

## 2023-08-29 PROCEDURE — 2709999900 HC NON-CHARGEABLE SUPPLY: Performed by: ORTHOPAEDIC SURGERY

## 2023-08-29 DEVICE — SMARTSET GHV GENTAMICIN HIGH VISCOSITY BONE CEMENT 40G
Type: IMPLANTABLE DEVICE | Site: KNEE | Status: FUNCTIONAL
Brand: SMARTSET

## 2023-08-29 DEVICE — IMPLANTABLE DEVICE
Type: IMPLANTABLE DEVICE | Site: KNEE | Status: FUNCTIONAL
Brand: PERSONA® VIVACIT-E®

## 2023-08-29 DEVICE — IMPLANTABLE DEVICE
Type: IMPLANTABLE DEVICE | Site: KNEE | Status: FUNCTIONAL
Brand: PERSONA®

## 2023-08-29 DEVICE — KNEE K1 TOT HEMI STD CEM IMPL CAPPED K1 ZIM: Type: IMPLANTABLE DEVICE | Status: FUNCTIONAL

## 2023-08-29 DEVICE — PSN TIB STM 5 DEG SZ D R: Type: IMPLANTABLE DEVICE | Site: KNEE | Status: FUNCTIONAL

## 2023-08-29 RX ORDER — ONDANSETRON 2 MG/ML
4 INJECTION INTRAMUSCULAR; INTRAVENOUS EVERY 6 HOURS PRN
Status: DISCONTINUED | OUTPATIENT
Start: 2023-08-29 | End: 2023-09-01 | Stop reason: HOSPADM

## 2023-08-29 RX ORDER — SODIUM CHLORIDE 9 MG/ML
INJECTION, SOLUTION INTRAVENOUS PRN
Status: DISCONTINUED | OUTPATIENT
Start: 2023-08-29 | End: 2023-08-29 | Stop reason: HOSPADM

## 2023-08-29 RX ORDER — HYDRALAZINE HYDROCHLORIDE 20 MG/ML
10 INJECTION INTRAMUSCULAR; INTRAVENOUS
Status: DISCONTINUED | OUTPATIENT
Start: 2023-08-29 | End: 2023-08-29 | Stop reason: HOSPADM

## 2023-08-29 RX ORDER — OXYCODONE HYDROCHLORIDE 5 MG/1
5 TABLET ORAL
Status: DISCONTINUED | OUTPATIENT
Start: 2023-08-29 | End: 2023-09-01 | Stop reason: HOSPADM

## 2023-08-29 RX ORDER — ONDANSETRON 2 MG/ML
INJECTION INTRAMUSCULAR; INTRAVENOUS PRN
Status: DISCONTINUED | OUTPATIENT
Start: 2023-08-29 | End: 2023-08-29 | Stop reason: SDUPTHER

## 2023-08-29 RX ORDER — ROPIVACAINE HYDROCHLORIDE 5 MG/ML
INJECTION, SOLUTION EPIDURAL; INFILTRATION; PERINEURAL
Status: COMPLETED | OUTPATIENT
Start: 2023-08-29 | End: 2023-08-29

## 2023-08-29 RX ORDER — HYDROMORPHONE HYDROCHLORIDE 1 MG/ML
0.5 INJECTION, SOLUTION INTRAMUSCULAR; INTRAVENOUS; SUBCUTANEOUS EVERY 5 MIN PRN
Status: DISCONTINUED | OUTPATIENT
Start: 2023-08-29 | End: 2023-08-29 | Stop reason: HOSPADM

## 2023-08-29 RX ORDER — SODIUM CHLORIDE 0.9 % (FLUSH) 0.9 %
5-40 SYRINGE (ML) INJECTION PRN
Status: DISCONTINUED | OUTPATIENT
Start: 2023-08-29 | End: 2023-08-29 | Stop reason: HOSPADM

## 2023-08-29 RX ORDER — ONDANSETRON 2 MG/ML
4 INJECTION INTRAMUSCULAR; INTRAVENOUS
Status: DISCONTINUED | OUTPATIENT
Start: 2023-08-29 | End: 2023-08-29 | Stop reason: HOSPADM

## 2023-08-29 RX ORDER — LEVOTHYROXINE SODIUM 0.03 MG/1
25 TABLET ORAL NIGHTLY
Status: DISCONTINUED | OUTPATIENT
Start: 2023-08-29 | End: 2023-09-01 | Stop reason: HOSPADM

## 2023-08-29 RX ORDER — ROPIVACAINE HYDROCHLORIDE 5 MG/ML
30 INJECTION, SOLUTION EPIDURAL; INFILTRATION; PERINEURAL ONCE
Status: DISCONTINUED | OUTPATIENT
Start: 2023-08-29 | End: 2023-08-29 | Stop reason: HOSPADM

## 2023-08-29 RX ORDER — 0.9 % SODIUM CHLORIDE 0.9 %
500 INTRAVENOUS SOLUTION INTRAVENOUS ONCE
Status: COMPLETED | OUTPATIENT
Start: 2023-08-29 | End: 2023-08-29

## 2023-08-29 RX ORDER — TRANEXAMIC ACID 100 MG/ML
INJECTION, SOLUTION INTRAVENOUS PRN
Status: DISCONTINUED | OUTPATIENT
Start: 2023-08-29 | End: 2023-08-29 | Stop reason: SDUPTHER

## 2023-08-29 RX ORDER — SODIUM CHLORIDE, SODIUM LACTATE, POTASSIUM CHLORIDE, CALCIUM CHLORIDE 600; 310; 30; 20 MG/100ML; MG/100ML; MG/100ML; MG/100ML
INJECTION, SOLUTION INTRAVENOUS CONTINUOUS
Status: DISCONTINUED | OUTPATIENT
Start: 2023-08-29 | End: 2023-08-29 | Stop reason: HOSPADM

## 2023-08-29 RX ORDER — HYDROMORPHONE HYDROCHLORIDE 1 MG/ML
0.5 INJECTION, SOLUTION INTRAMUSCULAR; INTRAVENOUS; SUBCUTANEOUS
Status: DISCONTINUED | OUTPATIENT
Start: 2023-08-29 | End: 2023-08-30

## 2023-08-29 RX ORDER — ASPIRIN 325 MG
325 TABLET, DELAYED RELEASE (ENTERIC COATED) ORAL 2 TIMES DAILY
Qty: 60 TABLET | Refills: 0 | Status: SHIPPED
Start: 2023-08-29 | End: 2023-09-28

## 2023-08-29 RX ORDER — ACETAMINOPHEN 500 MG
1000 TABLET ORAL EVERY 6 HOURS
Status: DISCONTINUED | OUTPATIENT
Start: 2023-08-29 | End: 2023-09-01 | Stop reason: HOSPADM

## 2023-08-29 RX ORDER — SODIUM CHLORIDE 9 MG/ML
INJECTION, SOLUTION INTRAVENOUS CONTINUOUS
Status: DISPENSED | OUTPATIENT
Start: 2023-08-29 | End: 2023-08-30

## 2023-08-29 RX ORDER — BUPIVACAINE HYDROCHLORIDE 5 MG/ML
INJECTION, SOLUTION EPIDURAL; INTRACAUDAL
Status: COMPLETED | OUTPATIENT
Start: 2023-08-29 | End: 2023-08-29

## 2023-08-29 RX ORDER — ACETAMINOPHEN 500 MG
1000 TABLET ORAL ONCE
Status: COMPLETED | OUTPATIENT
Start: 2023-08-29 | End: 2023-08-29

## 2023-08-29 RX ORDER — PROCHLORPERAZINE EDISYLATE 5 MG/ML
INJECTION INTRAMUSCULAR; INTRAVENOUS
Status: COMPLETED
Start: 2023-08-29 | End: 2023-08-29

## 2023-08-29 RX ORDER — HYDROXYZINE HYDROCHLORIDE 10 MG/1
10 TABLET, FILM COATED ORAL EVERY 8 HOURS PRN
Status: DISCONTINUED | OUTPATIENT
Start: 2023-08-29 | End: 2023-09-01 | Stop reason: HOSPADM

## 2023-08-29 RX ORDER — SODIUM CHLORIDE 0.9 % (FLUSH) 0.9 %
5-40 SYRINGE (ML) INJECTION EVERY 12 HOURS SCHEDULED
Status: DISCONTINUED | OUTPATIENT
Start: 2023-08-29 | End: 2023-08-29 | Stop reason: HOSPADM

## 2023-08-29 RX ORDER — FENTANYL CITRATE 50 UG/ML
100 INJECTION, SOLUTION INTRAMUSCULAR; INTRAVENOUS
Status: COMPLETED | OUTPATIENT
Start: 2023-08-29 | End: 2023-08-29

## 2023-08-29 RX ORDER — LIDOCAINE HYDROCHLORIDE 10 MG/ML
1 INJECTION, SOLUTION EPIDURAL; INFILTRATION; INTRACAUDAL; PERINEURAL
Status: DISCONTINUED | OUTPATIENT
Start: 2023-08-29 | End: 2023-08-29 | Stop reason: HOSPADM

## 2023-08-29 RX ORDER — CEFAZOLIN SODIUM 1 G/3ML
INJECTION, POWDER, FOR SOLUTION INTRAMUSCULAR; INTRAVENOUS PRN
Status: DISCONTINUED | OUTPATIENT
Start: 2023-08-29 | End: 2023-08-29 | Stop reason: SDUPTHER

## 2023-08-29 RX ORDER — ASPIRIN 325 MG
325 TABLET, DELAYED RELEASE (ENTERIC COATED) ORAL 2 TIMES DAILY
Status: DISCONTINUED | OUTPATIENT
Start: 2023-08-29 | End: 2023-09-01 | Stop reason: HOSPADM

## 2023-08-29 RX ORDER — SODIUM CHLORIDE 0.9 % (FLUSH) 0.9 %
5-40 SYRINGE (ML) INJECTION EVERY 12 HOURS SCHEDULED
Status: DISCONTINUED | OUTPATIENT
Start: 2023-08-29 | End: 2023-09-01 | Stop reason: HOSPADM

## 2023-08-29 RX ORDER — OXYCODONE HYDROCHLORIDE 5 MG/1
10 TABLET ORAL
Status: DISCONTINUED | OUTPATIENT
Start: 2023-08-29 | End: 2023-09-01 | Stop reason: HOSPADM

## 2023-08-29 RX ORDER — FENTANYL CITRATE 50 UG/ML
25 INJECTION, SOLUTION INTRAMUSCULAR; INTRAVENOUS EVERY 5 MIN PRN
Status: DISCONTINUED | OUTPATIENT
Start: 2023-08-29 | End: 2023-08-29 | Stop reason: HOSPADM

## 2023-08-29 RX ORDER — PHENYLEPHRINE HCL IN 0.9% NACL 0.4MG/10ML
SYRINGE (ML) INTRAVENOUS PRN
Status: DISCONTINUED | OUTPATIENT
Start: 2023-08-29 | End: 2023-08-29 | Stop reason: SDUPTHER

## 2023-08-29 RX ORDER — VENLAFAXINE HYDROCHLORIDE 150 MG/1
150 CAPSULE, EXTENDED RELEASE ORAL DAILY
Status: DISCONTINUED | OUTPATIENT
Start: 2023-08-29 | End: 2023-08-30 | Stop reason: SDUPTHER

## 2023-08-29 RX ORDER — OXYCODONE HYDROCHLORIDE 5 MG/1
5-10 TABLET ORAL EVERY 4 HOURS PRN
Qty: 50 TABLET | Refills: 0 | Status: SHIPPED | OUTPATIENT
Start: 2023-08-29 | End: 2023-09-05

## 2023-08-29 RX ORDER — MIDAZOLAM HYDROCHLORIDE 2 MG/2ML
2 INJECTION, SOLUTION INTRAMUSCULAR; INTRAVENOUS
Status: COMPLETED | OUTPATIENT
Start: 2023-08-29 | End: 2023-08-29

## 2023-08-29 RX ORDER — PROCHLORPERAZINE EDISYLATE 5 MG/ML
5 INJECTION INTRAMUSCULAR; INTRAVENOUS
Status: COMPLETED | OUTPATIENT
Start: 2023-08-29 | End: 2023-08-29

## 2023-08-29 RX ORDER — SODIUM CHLORIDE 9 MG/ML
INJECTION, SOLUTION INTRAVENOUS PRN
Status: DISCONTINUED | OUTPATIENT
Start: 2023-08-29 | End: 2023-09-01 | Stop reason: HOSPADM

## 2023-08-29 RX ORDER — LIDOCAINE HYDROCHLORIDE 20 MG/ML
INJECTION, SOLUTION EPIDURAL; INFILTRATION; INTRACAUDAL; PERINEURAL PRN
Status: DISCONTINUED | OUTPATIENT
Start: 2023-08-29 | End: 2023-08-29 | Stop reason: SDUPTHER

## 2023-08-29 RX ORDER — ONDANSETRON 4 MG/1
4 TABLET, ORALLY DISINTEGRATING ORAL EVERY 8 HOURS PRN
Status: DISCONTINUED | OUTPATIENT
Start: 2023-08-29 | End: 2023-09-01 | Stop reason: HOSPADM

## 2023-08-29 RX ORDER — POLYETHYLENE GLYCOL 3350 17 G/17G
17 POWDER, FOR SOLUTION ORAL DAILY
Status: DISCONTINUED | OUTPATIENT
Start: 2023-08-29 | End: 2023-09-01 | Stop reason: HOSPADM

## 2023-08-29 RX ORDER — PROPOFOL 10 MG/ML
INJECTION, EMULSION INTRAVENOUS PRN
Status: DISCONTINUED | OUTPATIENT
Start: 2023-08-29 | End: 2023-08-29 | Stop reason: SDUPTHER

## 2023-08-29 RX ORDER — LAMOTRIGINE 100 MG/1
200 TABLET ORAL NIGHTLY
Status: DISCONTINUED | OUTPATIENT
Start: 2023-08-29 | End: 2023-09-01 | Stop reason: HOSPADM

## 2023-08-29 RX ORDER — SENNA AND DOCUSATE SODIUM 50; 8.6 MG/1; MG/1
1 TABLET, FILM COATED ORAL 2 TIMES DAILY
Status: DISCONTINUED | OUTPATIENT
Start: 2023-08-29 | End: 2023-09-01 | Stop reason: HOSPADM

## 2023-08-29 RX ORDER — OXYCODONE HYDROCHLORIDE 5 MG/1
5 TABLET ORAL
Status: DISCONTINUED | OUTPATIENT
Start: 2023-08-29 | End: 2023-08-29 | Stop reason: HOSPADM

## 2023-08-29 RX ORDER — SENNA AND DOCUSATE SODIUM 50; 8.6 MG/1; MG/1
1 TABLET, FILM COATED ORAL 2 TIMES DAILY
Qty: 60 TABLET | Refills: 0 | Status: SHIPPED
Start: 2023-08-29 | End: 2023-09-28

## 2023-08-29 RX ORDER — VENLAFAXINE 25 MG/1
75 TABLET ORAL DAILY
Status: DISCONTINUED | OUTPATIENT
Start: 2023-08-29 | End: 2023-08-30 | Stop reason: SDUPTHER

## 2023-08-29 RX ORDER — TRAZODONE HYDROCHLORIDE 100 MG/1
50 TABLET ORAL
Status: DISCONTINUED | OUTPATIENT
Start: 2023-08-29 | End: 2023-09-01 | Stop reason: HOSPADM

## 2023-08-29 RX ORDER — SODIUM CHLORIDE 0.9 % (FLUSH) 0.9 %
5-40 SYRINGE (ML) INJECTION PRN
Status: DISCONTINUED | OUTPATIENT
Start: 2023-08-29 | End: 2023-09-01 | Stop reason: HOSPADM

## 2023-08-29 RX ORDER — FAMOTIDINE 20 MG/1
20 TABLET, FILM COATED ORAL 2 TIMES DAILY PRN
Status: DISCONTINUED | OUTPATIENT
Start: 2023-08-29 | End: 2023-09-01 | Stop reason: HOSPADM

## 2023-08-29 RX ORDER — RISPERIDONE 3 MG/1
3 TABLET ORAL NIGHTLY
Status: DISCONTINUED | OUTPATIENT
Start: 2023-08-29 | End: 2023-09-01 | Stop reason: HOSPADM

## 2023-08-29 RX ADMIN — PROCHLORPERAZINE EDISYLATE 5 MG: 5 INJECTION INTRAMUSCULAR; INTRAVENOUS at 11:49

## 2023-08-29 RX ADMIN — SODIUM CHLORIDE, POTASSIUM CHLORIDE, SODIUM LACTATE AND CALCIUM CHLORIDE: 600; 310; 30; 20 INJECTION, SOLUTION INTRAVENOUS at 07:26

## 2023-08-29 RX ADMIN — LAMOTRIGINE 200 MG: 100 TABLET ORAL at 21:55

## 2023-08-29 RX ADMIN — OXYCODONE HYDROCHLORIDE 10 MG: 5 TABLET ORAL at 13:00

## 2023-08-29 RX ADMIN — ROPIVACAINE HYDROCHLORIDE 30 ML: 5 INJECTION, SOLUTION EPIDURAL; INFILTRATION; PERINEURAL at 07:15

## 2023-08-29 RX ADMIN — BUPIVACAINE HYDROCHLORIDE 10 MG: 5 INJECTION, SOLUTION EPIDURAL; INTRACAUDAL; PERINEURAL at 07:32

## 2023-08-29 RX ADMIN — OXYCODONE HYDROCHLORIDE 10 MG: 5 TABLET ORAL at 19:02

## 2023-08-29 RX ADMIN — PROPOFOL 40 MCG/KG/MIN: 10 INJECTION, EMULSION INTRAVENOUS at 07:35

## 2023-08-29 RX ADMIN — OXYCODONE HYDROCHLORIDE 10 MG: 5 TABLET ORAL at 22:00

## 2023-08-29 RX ADMIN — ACETAMINOPHEN 1000 MG: 500 TABLET ORAL at 13:00

## 2023-08-29 RX ADMIN — PROCHLORPERAZINE EDISYLATE 5 MG: 5 INJECTION, SOLUTION INTRAMUSCULAR; INTRAVENOUS at 11:49

## 2023-08-29 RX ADMIN — SENNOSIDES AND DOCUSATE SODIUM 1 TABLET: 50; 8.6 TABLET ORAL at 21:56

## 2023-08-29 RX ADMIN — PROPOFOL 30 MG: 10 INJECTION, EMULSION INTRAVENOUS at 09:20

## 2023-08-29 RX ADMIN — POLYETHYLENE GLYCOL 3350 17 G: 17 POWDER, FOR SOLUTION ORAL at 16:11

## 2023-08-29 RX ADMIN — FENTANYL CITRATE 50 MCG: 50 INJECTION, SOLUTION INTRAMUSCULAR; INTRAVENOUS at 07:11

## 2023-08-29 RX ADMIN — PROPOFOL 20 MG: 10 INJECTION, EMULSION INTRAVENOUS at 07:30

## 2023-08-29 RX ADMIN — Medication 40 MCG: at 08:19

## 2023-08-29 RX ADMIN — CEFAZOLIN 3000 MG: 10 INJECTION, POWDER, FOR SOLUTION INTRAVENOUS at 17:22

## 2023-08-29 RX ADMIN — PROPOFOL 40 MG: 10 INJECTION, EMULSION INTRAVENOUS at 09:01

## 2023-08-29 RX ADMIN — ACETAMINOPHEN 1000 MG: 500 TABLET ORAL at 06:59

## 2023-08-29 RX ADMIN — CEFAZOLIN 3 G: 330 INJECTION, POWDER, FOR SOLUTION INTRAMUSCULAR; INTRAVENOUS at 07:36

## 2023-08-29 RX ADMIN — ASPIRIN 325 MG: 325 TABLET, COATED ORAL at 21:54

## 2023-08-29 RX ADMIN — SODIUM CHLORIDE 500 ML: 9 INJECTION, SOLUTION INTRAVENOUS at 13:06

## 2023-08-29 RX ADMIN — OXYCODONE HYDROCHLORIDE 10 MG: 5 TABLET ORAL at 16:09

## 2023-08-29 RX ADMIN — LIDOCAINE HYDROCHLORIDE 20 MG: 20 INJECTION, SOLUTION EPIDURAL; INFILTRATION; INTRACAUDAL; PERINEURAL at 07:28

## 2023-08-29 RX ADMIN — ASPIRIN 325 MG: 325 TABLET, COATED ORAL at 13:00

## 2023-08-29 RX ADMIN — SODIUM CHLORIDE: 9 INJECTION, SOLUTION INTRAVENOUS at 09:11

## 2023-08-29 RX ADMIN — TRANEXAMIC ACID 1000 MG: 100 INJECTION, SOLUTION INTRAVENOUS at 07:36

## 2023-08-29 RX ADMIN — RISPERIDONE 3 MG: 3 TABLET ORAL at 21:56

## 2023-08-29 RX ADMIN — MIDAZOLAM 2 MG: 1 INJECTION INTRAMUSCULAR; INTRAVENOUS at 07:11

## 2023-08-29 RX ADMIN — ONDANSETRON HYDROCHLORIDE 4 MG: 2 INJECTION, SOLUTION INTRAMUSCULAR; INTRAVENOUS at 07:22

## 2023-08-29 RX ADMIN — ACETAMINOPHEN 1000 MG: 500 TABLET ORAL at 18:56

## 2023-08-29 RX ADMIN — PROPOFOL 30 MG: 10 INJECTION, EMULSION INTRAVENOUS at 07:28

## 2023-08-29 RX ADMIN — LEVOTHYROXINE SODIUM 25 MCG: 0.03 TABLET ORAL at 21:54

## 2023-08-29 ASSESSMENT — PAIN DESCRIPTION - ORIENTATION
ORIENTATION: RIGHT
ORIENTATION: RIGHT;ANTERIOR;POSTERIOR
ORIENTATION: ANTERIOR
ORIENTATION: LEFT
ORIENTATION: RIGHT;ANTERIOR;POSTERIOR

## 2023-08-29 ASSESSMENT — PAIN - FUNCTIONAL ASSESSMENT
PAIN_FUNCTIONAL_ASSESSMENT: ACTIVITIES ARE NOT PREVENTED

## 2023-08-29 ASSESSMENT — PAIN DESCRIPTION - LOCATION
LOCATION: KNEE
LOCATION: KNEE;LEG
LOCATION: KNEE

## 2023-08-29 ASSESSMENT — PAIN DESCRIPTION - DESCRIPTORS
DESCRIPTORS: ACHING;DISCOMFORT
DESCRIPTORS: THROBBING;ACHING
DESCRIPTORS: ACHING;THROBBING
DESCRIPTORS: ACHING;THROBBING
DESCRIPTORS: ACHING

## 2023-08-29 ASSESSMENT — PAIN DESCRIPTION - PAIN TYPE: TYPE: SURGICAL PAIN

## 2023-08-29 ASSESSMENT — PAIN SCALES - GENERAL
PAINLEVEL_OUTOF10: 8
PAINLEVEL_OUTOF10: 0
PAINLEVEL_OUTOF10: 3
PAINLEVEL_OUTOF10: 0
PAINLEVEL_OUTOF10: 5
PAINLEVEL_OUTOF10: 6
PAINLEVEL_OUTOF10: 7
PAINLEVEL_OUTOF10: 7

## 2023-08-29 NOTE — OP NOTE
Name: Man Cody  MRN:  [id number]  : 1960  Age:  58 y.o. Surgery Date: 2023      OPERATIVE REPORT - RIGHT TOTAL KNEE REPLACEMENT    PREOPERATIVE DIAGNOSIS: Osteoarthritis, right knee. POSTOPERATIVE DIAGNOSIS: Osteoarthritis, right knee. PROCEDURE PERFORMED: Right total knee arthroplasty. SURGEON: Rob French MD    FIRST ASSISTANT:  Ken Barth. ANESTHESIA: Spinal    PRE-OP ANTIBIOTIC: Ancef 3g    COMPLICATIONS: None. ESTIMATED BLOOD LOSS: 50 mL. SPECIMENS REMOVED: None. COMPONENTS IMPLANTED:   Implant Name Type Inv. Item Serial No.  Lot No. LRB No. Used Action   CEMENT BNE 40GM FULL DOSE PMMA W/ GENT HI VISC RADPQ LNG - SNA  CEMENT BNE 40GM FULL DOSE PMMA W/ GENT HI VISC RADPQ LNG NA [employer]- [id number] Right 3 Implanted   COMPONENT FEM SZ 8 R KNEE CO CHROM SELAM CRUCE RET ELIDA - SNA  COMPONENT FEM SZ 8 R KNEE CO CHROM SELAM CRUCE RET ELIDA NA [employer]- [id number] Right 1 Implanted   EXTENSION STEM L30MM [id number] KNEE TAPR ELIDA PERSONA - SNA  EXTENSION STEM L30MM [id number] KNEE TAPR ELIDA PERSONA NA [employer]ERY[employer] [facility] [id number] Right 1 Implanted   PSN TIB STM 5 DEG SZ D R - SNA  PSN TIB STM 5 DEG SZ D R NA SHERYL [employer] [facility] [id number] Right 1 Implanted   COMPONENT PAT [id number] THK8MM STD VIVACIT-E ELIDA INSET FOR - SNA  COMPONENT PAT [id number] THK8MM STD VIVACIT-E ELIDA INSET FOR NA SHERYL [employer]- [id number] Right 1 Implanted   PSN MC VE ASF R 14MM 8-9/CD - SNA  PSN MC VE ASF R 14MM 8-9/CD NA SHERYL [employer]- [id number] Right 1 Implanted       INDICATIONS: The patient is an 58 yrs female with progressive debilitating right knee pain due to severe osteoarthritis. Symptoms have progressed despite comprehensive conservative treatment and the patient presents for right total knee replacement. Risks, benefits, alternatives of the procedure were reviewed in detail and the patient elects to proceed. The patient understands the risk for perioperative medical complications. DESCRIPTION OF PROCEDURE: Spinal anesthesia was initiated. Preoperative dose of antibiotic was given. Diehl catheter was not placed. The right lower extremity was prepped and draped in the usual sterile fashion. The skin was covered with Ioban occlusive dressing. The right lower extremity was flexed. A medial parapatellar incision was made to the right knee. The right knee was exposed and the distal femur was cut at 5 degrees distal femoral valgus. Femur was sized for a size 8N. An AP cutting block was placed, rotated based on bony landmarks. Femoral cuts were completed for a size 8N. The tibia was subluxed and a neutral varus/valgus proximal tibial cut was made matching the patient's slope. The meniscal remnants were removed. The posterior osteophytes were removed from the femur. Gaps were examined. The flexion and extension gaps were 14 mm. The femur was finished for a 8N, tibia for a D with stem. Trials were placed. Patella was cut and a 29 mm trial was fitted . The knee tracked well with all trial implants. The trials were then removed. Bony surfaces were drilled, lavaged, and dried. Elevated the tourniquet. All components were cemented. Excess cement was removed. A 14 polyethylene component was placed. After the cement had fully cured, the knee was ranged and  irrigated copiously with pulsatile lavage. All surrounding soft tissues were infiltrated with local anesthetic. The arthrotomy was closed with #2 Vicryl sutures and 0 Vicryl sutures. Skin and subcutaneous tissues were irrigated and closed in standard fashion. Sterile dressing was applied. There were no complications. The procedure was a RIGHT TOTAL KNEE REPLACEMENT. A Johana total knee construct was utilized. No specimens were obtained/sent. The patient was transferred to the recovery room in stable condition.     Andrea Class was critical throughout the case to

## 2023-08-29 NOTE — PLAN OF CARE
Problem: Physical Therapy - Adult  Goal: By Discharge: Performs mobility at highest level of function for planned discharge setting. See evaluation for individualized goals. Description: FUNCTIONAL STATUS PRIOR TO ADMISSION: Patient was independent and active without use of DME.    HOME SUPPORT PRIOR TO ADMISSION: The patient lived alone but did not require assistance. Physical Therapy Goals  Initiated 8/29/2023  1. Patient will move from supine to sit and sit to supine, scoot up and down, and roll side to side in bed with modified independence within 4 day(s). 2.  Patient will perform sit to stand with modified independence within 4 day(s). 3.  Patient will transfer from bed to chair and chair to bed with modified independence using the least restrictive device within 4 day(s). 4.  Patient will ambulate with modified independence for 150 feet with the least restrictive device within 4 day(s). 5. Patient will perform post-TKA home exercise program per protocol with independence within 4 days. 6. Patient will demonstrate AROM 0-90 degrees in operative joint within 4 days. Outcome: Progressing   PHYSICAL THERAPY EVALUATION    Patient: Geovanny Rivera (44 y.o. female)  Date: 8/29/2023  Primary Diagnosis: Primary osteoarthritis of right knee [M17.11]  Arthritis of right knee [M17.11]  S/P total knee arthroplasty, right [Z96.651]  Procedure(s) (LRB):  RIGHT TOTAL KNEE ARTHROPLASTY (Right) Day of Surgery   Precautions: Weight Bearing, Fall Risk Right Lower Extremity Weight Bearing: Weight Bearing As Tolerated                  ASSESSMENT :   DEFICITS/IMPAIRMENTS:   Based on the objective data described below, the patient presents with  impairment in functional mobility, activity tolerance and balance s/p R TKA. PLOF: Independent with ADLs and IADLs. Patient lives alone in a one story apartment with ramped entrance and elevator. Patient's mobility was on target for POD#0.  Will address more

## 2023-08-29 NOTE — ANESTHESIA PROCEDURE NOTES
Spinal Block    End time: 8/29/2023 7:40 AM  Reason for block: post-op pain management, primary anesthetic and at surgeon's request  Staffing  Performed: resident/CRNA   Spinal Block  Patient position: sitting  Prep: Betadine  Patient monitoring: continuous pulse ox, frequent blood pressure checks, oxygen and continuous capnometry  Approach: midline  Location: L3/L4  Provider prep: mask and sterile gloves  Local infiltration: lidocaine  Needle  Needle type: Sprotte Tip   Needle gauge: 24 G  Needle length: 4 in  Assessment  Swirl obtained: Yes  CSF: clear  Attempts: 1  Hemodynamics: stable  Additional Notes  Spinal performed by PROSPER Boyce  Preanesthetic Checklist  Completed: patient identified, IV checked, site marked, risks and benefits discussed, surgical/procedural consents, equipment checked, pre-op evaluation, timeout performed, anesthesia consent given, oxygen available, monitors applied/VS acknowledged, fire risk safety assessment completed and verbalized and blood product R/B/A discussed and consented

## 2023-08-29 NOTE — FLOWSHEET NOTE
Pt had c/o mild nausea earlier. Denied offer of anti nausea medication. Nausea has persisted. Pt given compazine at 1149 and it has mostly resolved her nausea. ,

## 2023-08-29 NOTE — ANESTHESIA PROCEDURE NOTES
Peripheral Block    Patient location during procedure: pre-op  Reason for block: post-op pain management and at surgeon's request  Start time: 8/29/2023 7:10 AM  End time: 8/29/2023 7:15 AM  Staffing  Performed: anesthesiologist   Anesthesiologist: Antonieta Luna MD  Preanesthetic Checklist  Completed: patient identified, IV checked, site marked, risks and benefits discussed, surgical/procedural consents, equipment checked, pre-op evaluation, timeout performed, anesthesia consent given, oxygen available, monitors applied/VS acknowledged and fire risk safety assessment completed and verbalized  Peripheral Block   Patient position: supine  Prep: ChloraPrep  Provider prep: mask and sterile gloves  Patient monitoring: cardiac monitor, continuous pulse ox, frequent blood pressure checks, IV access and oxygen  Block type: Saphenous  Laterality: right  Injection technique: single-shot  Guidance: ultrasound guided  Local infiltration: ropivacaine  Infiltration strength: 0.5 %  Local infiltration: ropivacaineDose: 30 mL    Needle   Needle type: insulated echogenic nerve stimulator needle   Needle gauge: 21 G  Needle localization: anatomical landmarks and ultrasound guidance  Needle length: 10 cm  Assessment   Injection assessment: negative aspiration for heme, no paresthesia on injection, local visualized surrounding nerve on ultrasound and no intravascular symptoms  Paresthesia pain: none  Slow fractionated injection: yes  Hemodynamics: stable  Real-time US image taken/store: yes  Outcomes: uncomplicated and patient tolerated procedure well    Medications Administered  ropivacaine (NAROPIN) injection 0.5% - Perineural   30 mL - 8/29/2023 7:15:00 AM

## 2023-08-29 NOTE — PLAN OF CARE
Problem: Pain  Goal: Verbalizes/displays adequate comfort level or baseline comfort level  Outcome: Progressing     Problem: Safety - Adult  Goal: Free from fall injury  Outcome: Progressing     Problem: Discharge Planning  Goal: Discharge to home or other facility with appropriate resources  Outcome: Progressing     Problem: Physical Therapy - Adult  Goal: By Discharge: Performs mobility at highest level of function for planned discharge setting. See evaluation for individualized goals. Description: FUNCTIONAL STATUS PRIOR TO ADMISSION: Patient was independent and active without use of DME.    HOME SUPPORT PRIOR TO ADMISSION: The patient lived alone but did not require assistance. Physical Therapy Goals  Initiated 8/29/2023  1. Patient will move from supine to sit and sit to supine, scoot up and down, and roll side to side in bed with modified independence within 4 day(s). 2.  Patient will perform sit to stand with modified independence within 4 day(s). 3.  Patient will transfer from bed to chair and chair to bed with modified independence using the least restrictive device within 4 day(s). 4.  Patient will ambulate with modified independence for 150 feet with the least restrictive device within 4 day(s). 5. Patient will perform post-TKA home exercise program per protocol with independence within 4 days. 6. Patient will demonstrate AROM 0-90 degrees in operative joint within 4 days.      8/29/2023 1454 by Maxine Matthews, PT  Outcome: Progressing  8/29/2023 1359 by Maxine Matthews, PT  Outcome: Progressing

## 2023-08-30 LAB
ANION GAP SERPL CALC-SCNC: 7 MMOL/L (ref 5–15)
BUN SERPL-MCNC: 12 MG/DL (ref 6–20)
BUN/CREAT SERPL: 14 (ref 12–20)
CALCIUM SERPL-MCNC: 8.2 MG/DL (ref 8.5–10.1)
CHLORIDE SERPL-SCNC: 107 MMOL/L (ref 97–108)
CO2 SERPL-SCNC: 26 MMOL/L (ref 21–32)
CREAT SERPL-MCNC: 0.84 MG/DL (ref 0.55–1.02)
GLUCOSE SERPL-MCNC: 104 MG/DL (ref 65–100)
HCT VFR BLD AUTO: 36.6 % (ref 35–47)
HGB BLD-MCNC: 11.5 G/DL (ref 11.5–16)
POTASSIUM SERPL-SCNC: 4.4 MMOL/L (ref 3.5–5.1)
SODIUM SERPL-SCNC: 140 MMOL/L (ref 136–145)

## 2023-08-30 PROCEDURE — 1100000000 HC RM PRIVATE

## 2023-08-30 PROCEDURE — 36415 COLL VENOUS BLD VENIPUNCTURE: CPT

## 2023-08-30 PROCEDURE — 6360000002 HC RX W HCPCS: Performed by: ORTHOPAEDIC SURGERY

## 2023-08-30 PROCEDURE — 85014 HEMATOCRIT: CPT

## 2023-08-30 PROCEDURE — 97535 SELF CARE MNGMENT TRAINING: CPT

## 2023-08-30 PROCEDURE — 97116 GAIT TRAINING THERAPY: CPT

## 2023-08-30 PROCEDURE — 80048 BASIC METABOLIC PNL TOTAL CA: CPT

## 2023-08-30 PROCEDURE — 6370000000 HC RX 637 (ALT 250 FOR IP)

## 2023-08-30 PROCEDURE — 97530 THERAPEUTIC ACTIVITIES: CPT

## 2023-08-30 PROCEDURE — 6370000000 HC RX 637 (ALT 250 FOR IP): Performed by: ORTHOPAEDIC SURGERY

## 2023-08-30 PROCEDURE — 97165 OT EVAL LOW COMPLEX 30 MIN: CPT

## 2023-08-30 PROCEDURE — 2580000003 HC RX 258: Performed by: ORTHOPAEDIC SURGERY

## 2023-08-30 PROCEDURE — 85018 HEMOGLOBIN: CPT

## 2023-08-30 RX ORDER — VENLAFAXINE HYDROCHLORIDE 75 MG/1
75 CAPSULE, EXTENDED RELEASE ORAL
COMMUNITY

## 2023-08-30 RX ORDER — VENLAFAXINE HYDROCHLORIDE 37.5 MG/1
75 CAPSULE, EXTENDED RELEASE ORAL
Status: DISCONTINUED | OUTPATIENT
Start: 2023-08-30 | End: 2023-09-01 | Stop reason: HOSPADM

## 2023-08-30 RX ORDER — VENLAFAXINE HYDROCHLORIDE 150 MG/1
150 CAPSULE, EXTENDED RELEASE ORAL
Status: DISCONTINUED | OUTPATIENT
Start: 2023-08-30 | End: 2023-09-01 | Stop reason: HOSPADM

## 2023-08-30 RX ADMIN — OXYCODONE HYDROCHLORIDE 10 MG: 5 TABLET ORAL at 01:22

## 2023-08-30 RX ADMIN — SENNOSIDES AND DOCUSATE SODIUM 1 TABLET: 50; 8.6 TABLET ORAL at 09:02

## 2023-08-30 RX ADMIN — ASPIRIN 325 MG: 325 TABLET, COATED ORAL at 20:57

## 2023-08-30 RX ADMIN — OXYCODONE HYDROCHLORIDE 5 MG: 5 TABLET ORAL at 09:02

## 2023-08-30 RX ADMIN — OXYCODONE HYDROCHLORIDE 10 MG: 5 TABLET ORAL at 19:30

## 2023-08-30 RX ADMIN — LEVOTHYROXINE SODIUM 25 MCG: 0.03 TABLET ORAL at 20:57

## 2023-08-30 RX ADMIN — SODIUM CHLORIDE, PRESERVATIVE FREE 10 ML: 5 INJECTION INTRAVENOUS at 09:04

## 2023-08-30 RX ADMIN — ASPIRIN 325 MG: 325 TABLET, COATED ORAL at 09:02

## 2023-08-30 RX ADMIN — RISPERIDONE 3 MG: 3 TABLET ORAL at 20:57

## 2023-08-30 RX ADMIN — SODIUM CHLORIDE, PRESERVATIVE FREE 10 ML: 5 INJECTION INTRAVENOUS at 21:43

## 2023-08-30 RX ADMIN — ACETAMINOPHEN 1000 MG: 500 TABLET ORAL at 19:28

## 2023-08-30 RX ADMIN — OXYCODONE HYDROCHLORIDE 10 MG: 5 TABLET ORAL at 12:23

## 2023-08-30 RX ADMIN — VENLAFAXINE HYDROCHLORIDE 75 MG: 37.5 CAPSULE, EXTENDED RELEASE ORAL at 09:01

## 2023-08-30 RX ADMIN — POLYETHYLENE GLYCOL 3350 17 G: 17 POWDER, FOR SOLUTION ORAL at 08:54

## 2023-08-30 RX ADMIN — CEFAZOLIN 3000 MG: 10 INJECTION, POWDER, FOR SOLUTION INTRAVENOUS at 00:15

## 2023-08-30 RX ADMIN — LAMOTRIGINE 200 MG: 100 TABLET ORAL at 20:57

## 2023-08-30 RX ADMIN — VENLAFAXINE HYDROCHLORIDE 150 MG: 150 CAPSULE, EXTENDED RELEASE ORAL at 09:00

## 2023-08-30 RX ADMIN — SENNOSIDES AND DOCUSATE SODIUM 1 TABLET: 50; 8.6 TABLET ORAL at 20:57

## 2023-08-30 RX ADMIN — ACETAMINOPHEN 1000 MG: 500 TABLET ORAL at 12:23

## 2023-08-30 RX ADMIN — ACETAMINOPHEN 1000 MG: 500 TABLET ORAL at 01:21

## 2023-08-30 ASSESSMENT — PAIN - FUNCTIONAL ASSESSMENT
PAIN_FUNCTIONAL_ASSESSMENT: ACTIVITIES ARE NOT PREVENTED

## 2023-08-30 ASSESSMENT — PAIN DESCRIPTION - LOCATION
LOCATION: KNEE;LEG
LOCATION: KNEE
LOCATION: KNEE;LEG

## 2023-08-30 ASSESSMENT — PAIN SCALES - GENERAL
PAINLEVEL_OUTOF10: 8
PAINLEVEL_OUTOF10: 6
PAINLEVEL_OUTOF10: 3
PAINLEVEL_OUTOF10: 3
PAINLEVEL_OUTOF10: 7

## 2023-08-30 ASSESSMENT — PAIN DESCRIPTION - DESCRIPTORS
DESCRIPTORS: ACHING
DESCRIPTORS: ACHING;BURNING;STABBING;THROBBING;SHARP
DESCRIPTORS: ACHING

## 2023-08-30 ASSESSMENT — PAIN DESCRIPTION - ORIENTATION
ORIENTATION: RIGHT;ANTERIOR
ORIENTATION: ANTERIOR;POSTERIOR
ORIENTATION: RIGHT

## 2023-08-30 NOTE — ANESTHESIA POSTPROCEDURE EVALUATION
Department of Anesthesiology  Postprocedure Note    Patient: Man Cody  MRN: [id number]  YOB: 1960  Date of evaluation: 8/30/2023      Procedure Summary     Date: 08/29/23 Room / Location: [facility] MAIN OR [address] [place] [address] MAIN OR    Anesthesia Start: 0726 Anesthesia Stop: 1000    Procedure: RIGHT TOTAL KNEE ARTHROPLASTY (Right: Knee) Diagnosis:       Primary osteoarthritis of right knee      (Primary osteoarthritis of right knee [M17.11])    Providers: Rob French MD Responsible Provider: Halle Pelletier MD    Anesthesia Type: Spinal ASA Status: 3          Anesthesia Type: Spinal    Pravin Phase I: Pravin Score: 10    Pravin Phase II:        Anesthesia Post Evaluation    Patient location during evaluation: PACU  Patient participation: complete - patient participated  Level of consciousness: awake  Airway patency: patent  Nausea & Vomiting: no nausea  Complications: no  Cardiovascular status: blood pressure returned to baseline and hemodynamically stable  Respiratory status: acceptable  Hydration status: stable  Multimodal analgesia pain management approach

## 2023-08-30 NOTE — CARE COORDINATION
Care Management Initial Assessment       RUR:8%  Readmission? No  1st IM letter given? Yes       Transition of Care Plan - SNF plan pending. SNF referrals have been sent to [facility], [address], [place] [facility], [address], [facility], and [facility]. [facility] has denied. [facility] has accepted for Friday and the pt has agreed. Pt has cane, rollator, and walker at home. Patient will need transport arranged at discharge, wheelchair van vs. [facility]. 08/30/23 1005   Service Assessment   Patient Orientation Alert and Oriented   Cognition Alert   History Provided By Patient   Primary [address] is: Named in [address]   PCP Verified by CM Yes   Prior Functional Level Independent in ADLs/IADLs   Can patient return to prior living arrangement Yes   Ability to make needs known: Good   Family able to assist with home care needs: Yes   Would you like for me to discuss the discharge plan with any other family members/significant others, and if so, who? Yes   Social/Functional History   Lives With Alone   Type of [facility] One level   [facility] Cane;Walker, rolling;Rollator   Receives Help From Family   Discharge Planning   Type of [address] Alone   Patient expects to be discharged to: [facility] Discharge   Transition of Care Consult ([facility]) [facility] Discharge Home Health   Condition of Participation: Discharge Planning   The Plan for Transition of Care is related to the following treatment goals: Home health   The Patient and/or Patient Representative was provided with a Choice of Provider? Patient   The Patient and/Or Patient Representative agree with the Discharge Plan?  Yes   Freedom of Choice list was provided with basic dialogue that supports the patient's

## 2023-08-30 NOTE — PLAN OF CARE
Problem: Physical Therapy - Adult  Goal: By Discharge: Performs mobility at highest level of function for planned discharge setting. See evaluation for individualized goals. Description: FUNCTIONAL STATUS PRIOR TO ADMISSION: Patient was independent and active without use of DME.    HOME SUPPORT PRIOR TO ADMISSION: The patient lived alone but did not require assistance. Physical Therapy Goals  Initiated 8/29/2023  1. Patient will move from supine to sit and sit to supine, scoot up and down, and roll side to side in bed with modified independence within 4 day(s). 2.  Patient will perform sit to stand with modified independence within 4 day(s). 3.  Patient will transfer from bed to chair and chair to bed with modified independence using the least restrictive device within 4 day(s). 4.  Patient will ambulate with modified independence for 150 feet with the least restrictive device within 4 day(s). 5. Patient will perform post-TKA home exercise program per protocol with independence within 4 days. 6. Patient will demonstrate AROM 0-90 degrees in operative joint within 4 days. Outcome: Progressing   PHYSICAL THERAPY TREATMENT-MORNING SESSION    Patient: Guru Austin (25 y.o. female)  Date: 8/30/2023  Diagnosis: Primary osteoarthritis of right knee [M17.11]  Arthritis of right knee [M17.11]  S/P total knee arthroplasty, right [Z96.651] Arthritis of right knee  Procedure(s) (LRB):  RIGHT TOTAL KNEE ARTHROPLASTY (Right) 1 Day Post-Op  Precautions: Weight Bearing, Fall Risk Right Lower Extremity Weight Bearing: Weight Bearing As Tolerated                  ASSESSMENT:  Patient was seen for morning PT session. She is walking 10 feet w/ RW (CGA). Pt declining further activity d/t pain, however did not quantify. Currently, expect patient will need 2-3 more session(s) to meet the therapy goals. PT plans to d/c to SNF since she lives alone.  Will follow up this afternoon for further mobility training

## 2023-08-30 NOTE — PLAN OF CARE
Problem: Physical Therapy - Adult  Goal: By Discharge: Performs mobility at highest level of function for planned discharge setting. See evaluation for individualized goals. Description: FUNCTIONAL STATUS PRIOR TO ADMISSION: Patient was independent and active without use of DME.    HOME SUPPORT PRIOR TO ADMISSION: The patient lived alone but did not require assistance. Physical Therapy Goals  Initiated 8/29/2023  1. Patient will move from supine to sit and sit to supine, scoot up and down, and roll side to side in bed with modified independence within 4 day(s). 2.  Patient will perform sit to stand with modified independence within 4 day(s). 3.  Patient will transfer from bed to chair and chair to bed with modified independence using the least restrictive device within 4 day(s). 4.  Patient will ambulate with modified independence for 150 feet with the least restrictive device within 4 day(s). 5. Patient will perform post-TKA home exercise program per protocol with independence within 4 days. 6. Patient will demonstrate AROM 0-90 degrees in operative joint within 4 days. 8/30/2023 1427 by Claire Barker PTA  Outcome: [address] SESSION    Patient: Janiya Lanza (49 y.o. female)  Date: 8/30/2023  Diagnosis: Primary osteoarthritis of right knee [M17.11]  Arthritis of right knee [M17.11]  S/P total knee arthroplasty, right [Z96.651] Arthritis of right knee  Procedure(s) (LRB):  RIGHT TOTAL KNEE ARTHROPLASTY (Right) 1 Day Post-Op  Precautions: Weight Bearing, Fall Risk Right Lower Extremity Weight Bearing: Weight Bearing As Tolerated                  ASSESSMENT:  Patient continues to benefit from skilled PT services and is slowly progressing towards goals. Patient is now ambulating approx 35ft contact guard assist using  the RW . Amb at slow but steady pace. Encouraged increased knee flex vs amb w/ \"stiff\" leg.  Continues to need Min Aw/ sit>stand from Strategies; Plan of Care;Home Exercise Program;Transfer Training  Education Provided Comments: Patient instructed not to get up without calling for assistance.   Education Method: Verbal  Barriers to Learning: None  Education Outcome: Verbalized understanding;Continued education needed      Eren Blanco PTA  Minutes: 26

## 2023-08-30 NOTE — PLAN OF CARE
Problem: Pain  Goal: Verbalizes/displays adequate comfort level or baseline comfort level  Outcome: Progressing     Problem: Safety - Adult  Goal: Free from fall injury  Outcome: Progressing     Problem: Discharge Planning  Goal: Discharge to home or other facility with appropriate resources  Outcome: Progressing     Problem: Physical Therapy - Adult  Goal: By Discharge: Performs mobility at highest level of function for planned discharge setting. See evaluation for individualized goals. Description: FUNCTIONAL STATUS PRIOR TO ADMISSION: Patient was independent and active without use of DME.    HOME SUPPORT PRIOR TO ADMISSION: The patient lived alone but did not require assistance. Physical Therapy Goals  Initiated 8/29/2023  1. Patient will move from supine to sit and sit to supine, scoot up and down, and roll side to side in bed with modified independence within 4 day(s). 2.  Patient will perform sit to stand with modified independence within 4 day(s). 3.  Patient will transfer from bed to chair and chair to bed with modified independence using the least restrictive device within 4 day(s). 4.  Patient will ambulate with modified independence for 150 feet with the least restrictive device within 4 day(s). 5. Patient will perform post-TKA home exercise program per protocol with independence within 4 days. 6. Patient will demonstrate AROM 0-90 degrees in operative joint within 4 days. 8/30/2023 1123 by Cheri Ace, [facility]  Outcome: Progressing     Problem: Occupational Therapy - Adult  Goal: By Discharge: Performs self-care activities at highest level of function for planned discharge setting. See evaluation for individualized goals.   Description: FUNCTIONAL STATUS PRIOR TO ADMISSION:  Patient was ambulatory using DME     Receives Help From: Family, ADL Assistance: Independent,  ,  ,  ,  ,  , Homemaking Assistance: Independent, Ambulation Assistance: Independent (Uses RW to walk 1 block, but

## 2023-08-31 PROCEDURE — 2580000003 HC RX 258: Performed by: ORTHOPAEDIC SURGERY

## 2023-08-31 PROCEDURE — 1100000000 HC RM PRIVATE

## 2023-08-31 PROCEDURE — 6370000000 HC RX 637 (ALT 250 FOR IP)

## 2023-08-31 PROCEDURE — 97116 GAIT TRAINING THERAPY: CPT

## 2023-08-31 PROCEDURE — 6370000000 HC RX 637 (ALT 250 FOR IP): Performed by: ORTHOPAEDIC SURGERY

## 2023-08-31 PROCEDURE — 97535 SELF CARE MNGMENT TRAINING: CPT

## 2023-08-31 RX ADMIN — ASPIRIN 325 MG: 325 TABLET, COATED ORAL at 08:31

## 2023-08-31 RX ADMIN — VENLAFAXINE HYDROCHLORIDE 75 MG: 37.5 CAPSULE, EXTENDED RELEASE ORAL at 07:01

## 2023-08-31 RX ADMIN — VENLAFAXINE HYDROCHLORIDE 150 MG: 150 CAPSULE, EXTENDED RELEASE ORAL at 07:01

## 2023-08-31 RX ADMIN — SENNOSIDES AND DOCUSATE SODIUM 1 TABLET: 50; 8.6 TABLET ORAL at 08:31

## 2023-08-31 RX ADMIN — ACETAMINOPHEN 1000 MG: 500 TABLET ORAL at 18:08

## 2023-08-31 RX ADMIN — OXYCODONE HYDROCHLORIDE 5 MG: 5 TABLET ORAL at 08:31

## 2023-08-31 RX ADMIN — LEVOTHYROXINE SODIUM 25 MCG: 0.03 TABLET ORAL at 20:44

## 2023-08-31 RX ADMIN — ASPIRIN 325 MG: 325 TABLET, COATED ORAL at 20:44

## 2023-08-31 RX ADMIN — SENNOSIDES AND DOCUSATE SODIUM 1 TABLET: 50; 8.6 TABLET ORAL at 20:45

## 2023-08-31 RX ADMIN — SODIUM CHLORIDE, PRESERVATIVE FREE 10 ML: 5 INJECTION INTRAVENOUS at 20:51

## 2023-08-31 RX ADMIN — OXYCODONE HYDROCHLORIDE 5 MG: 5 TABLET ORAL at 12:28

## 2023-08-31 RX ADMIN — POLYETHYLENE GLYCOL 3350 17 G: 17 POWDER, FOR SOLUTION ORAL at 08:30

## 2023-08-31 RX ADMIN — ACETAMINOPHEN 1000 MG: 500 TABLET ORAL at 05:37

## 2023-08-31 RX ADMIN — OXYCODONE HYDROCHLORIDE 5 MG: 5 TABLET ORAL at 20:43

## 2023-08-31 RX ADMIN — LAMOTRIGINE 200 MG: 100 TABLET ORAL at 20:44

## 2023-08-31 RX ADMIN — ACETAMINOPHEN 1000 MG: 500 TABLET ORAL at 12:27

## 2023-08-31 RX ADMIN — OXYCODONE HYDROCHLORIDE 5 MG: 5 TABLET ORAL at 05:37

## 2023-08-31 RX ADMIN — RISPERIDONE 3 MG: 3 TABLET ORAL at 20:44

## 2023-08-31 ASSESSMENT — PAIN DESCRIPTION - ORIENTATION
ORIENTATION: RIGHT

## 2023-08-31 ASSESSMENT — PAIN DESCRIPTION - DESCRIPTORS
DESCRIPTORS: ACHING
DESCRIPTORS: BURNING;ACHING
DESCRIPTORS: ACHING

## 2023-08-31 ASSESSMENT — PAIN DESCRIPTION - PAIN TYPE
TYPE: SURGICAL PAIN
TYPE: SURGICAL PAIN

## 2023-08-31 ASSESSMENT — PAIN DESCRIPTION - FREQUENCY
FREQUENCY: INTERMITTENT
FREQUENCY: INTERMITTENT

## 2023-08-31 ASSESSMENT — PAIN SCALES - GENERAL
PAINLEVEL_OUTOF10: 4
PAINLEVEL_OUTOF10: 3
PAINLEVEL_OUTOF10: 2
PAINLEVEL_OUTOF10: 6
PAINLEVEL_OUTOF10: 3
PAINLEVEL_OUTOF10: 2
PAINLEVEL_OUTOF10: 3
PAINLEVEL_OUTOF10: 5
PAINLEVEL_OUTOF10: 5

## 2023-08-31 ASSESSMENT — PAIN DESCRIPTION - LOCATION
LOCATION: KNEE

## 2023-08-31 ASSESSMENT — PAIN - FUNCTIONAL ASSESSMENT
PAIN_FUNCTIONAL_ASSESSMENT: ACTIVITIES ARE NOT PREVENTED
PAIN_FUNCTIONAL_ASSESSMENT: ACTIVITIES ARE NOT PREVENTED

## 2023-08-31 ASSESSMENT — PAIN DESCRIPTION - ONSET
ONSET: ON-GOING
ONSET: ON-GOING

## 2023-08-31 NOTE — PLAN OF CARE
Problem: Pain  Goal: Verbalizes/displays adequate comfort level or baseline comfort level  Outcome: Progressing     Problem: Safety - Adult  Goal: Free from fall injury  Outcome: Progressing     Problem: Discharge Planning  Goal: Discharge to home or other facility with appropriate resources  Outcome: Progressing  Flowsheets (Taken 8/30/2023 2045 by Lorrie Ray RN)  Discharge to home or other facility with appropriate resources: Identify barriers to discharge with patient and caregiver

## 2023-08-31 NOTE — PLAN OF CARE
Problem: Physical Therapy - Adult  Goal: By Discharge: Performs mobility at highest level of function for planned discharge setting. See evaluation for individualized goals. Description: FUNCTIONAL STATUS PRIOR TO ADMISSION: Patient was independent and active without use of DME.    HOME SUPPORT PRIOR TO ADMISSION: The patient lived alone but did not require assistance. Physical Therapy Goals  Initiated 8/29/2023  1. Patient will move from supine to sit and sit to supine, scoot up and down, and roll side to side in bed with modified independence within 4 day(s). 2.  Patient will perform sit to stand with modified independence within 4 day(s). 3.  Patient will transfer from bed to chair and chair to bed with modified independence using the least restrictive device within 4 day(s). 4.  Patient will ambulate with modified independence for 150 feet with the least restrictive device within 4 day(s). 5. Patient will perform post-TKA home exercise program per protocol with independence within 4 days. 6. Patient will demonstrate AROM 0-90 degrees in operative joint within 4 days. 8/31/2023 1458 by Salud Roldan, [facility]  Outcome: [address] SESSION    Patient: Shelley Valentin (37 y.o. female)  Date: 8/31/2023  Diagnosis: Primary osteoarthritis of right knee [M17.11]  Arthritis of right knee [M17.11]  S/P total knee arthroplasty, right [Z96.651] Arthritis of right knee  Procedure(s) (LRB):  RIGHT TOTAL KNEE ARTHROPLASTY (Right) 2 Days Post-Op  Precautions: Weight Bearing, Fall Risk Right Lower Extremity Weight Bearing: Weight Bearing As Tolerated                  ASSESSMENT:  Patient continues to benefit from skilled PT services and is slowly progressing towards goals. Maintaining 35 Ft of amb w/ seated rest break x1. CGA overall w/ amb.  SBA for bed mobility while pt using bed rail, gait belt and HOB elevated     Continue to anticipate  that patient will need to Education  Education Given To: Patient  Education Provided: Role of Therapy;Plan of Care;Home Exercise Program;Transfer Training  Education Method: Verbal  Barriers to Learning: None      Eren Blanco PTA  Minutes: 17

## 2023-08-31 NOTE — PLAN OF CARE
Problem: Physical Therapy - Adult  Goal: By Discharge: Performs mobility at highest level of function for planned discharge setting. See evaluation for individualized goals. Description: FUNCTIONAL STATUS PRIOR TO ADMISSION: Patient was independent and active without use of DME.    HOME SUPPORT PRIOR TO ADMISSION: The patient lived alone but did not require assistance. Physical Therapy Goals  Initiated 8/29/2023  1. Patient will move from supine to sit and sit to supine, scoot up and down, and roll side to side in bed with modified independence within 4 day(s). 2.  Patient will perform sit to stand with modified independence within 4 day(s). 3.  Patient will transfer from bed to chair and chair to bed with modified independence using the least restrictive device within 4 day(s). 4.  Patient will ambulate with modified independence for 150 feet with the least restrictive device within 4 day(s). 5. Patient will perform post-TKA home exercise program per protocol with independence within 4 days. 6. Patient will demonstrate AROM 0-90 degrees in operative joint within 4 days. Outcome: Progressing   PHYSICAL THERAPY TREATMENT-MORNING SESSION    Patient: Yony Busby (68 y.o. female)  Date: 8/31/2023  Diagnosis: Primary osteoarthritis of right knee [M17.11]  Arthritis of right knee [M17.11]  S/P total knee arthroplasty, right [Z96.651] Arthritis of right knee  Procedure(s) (LRB):  RIGHT TOTAL KNEE ARTHROPLASTY (Right) 2 Days Post-Op  Precautions: Weight Bearing, Fall Risk Right Lower Extremity Weight Bearing: Weight Bearing As Tolerated                  ASSESSMENT:  Patient was seen for morning PT session. She is walking 35 feet w/ the RW (CGA). Gait is steady w/no LOB or buckling of BLEs. Pt to d/c to Rehab (SNF) tomorrow. Will follow up this afternoon. SUBJECTIVE:   Patient stated, \"It's amazing a difference a day makes. .\"    OBJECTIVE DATA SUMMARY:     Functional Mobility Training

## 2023-08-31 NOTE — CARE COORDINATION
Transition of Care Plan - Plan for discharge to SNF tomorrow-patient has been accepted at several facilities but prefers [facility]. Patient will need transport arranged at discharge, [facility]. CM spoke with Patient at bedside, she prefers to move forward with [facility] rather than the [facility]. CM called Jaime Jorge Alberto at [facility] #[phone], confirmed they can accept patient tomorrow.     DION PIÑA, BSW/CRM

## 2023-09-01 ENCOUNTER — APPOINTMENT (OUTPATIENT)
Facility: HOSPITAL | Age: 63
End: 2023-09-01
Attending: ORTHOPAEDIC SURGERY
Payer: MEDICARE

## 2023-09-01 VITALS
HEART RATE: 95 BPM | DIASTOLIC BLOOD PRESSURE: 77 MMHG | HEIGHT: 62 IN | OXYGEN SATURATION: 98 % | SYSTOLIC BLOOD PRESSURE: 125 MMHG | WEIGHT: 267.4 LBS | TEMPERATURE: 98.3 F | BODY MASS INDEX: 49.21 KG/M2 | RESPIRATION RATE: 17 BRPM

## 2023-09-01 PROCEDURE — 93971 EXTREMITY STUDY: CPT

## 2023-09-01 PROCEDURE — 6370000000 HC RX 637 (ALT 250 FOR IP)

## 2023-09-01 PROCEDURE — 6370000000 HC RX 637 (ALT 250 FOR IP): Performed by: ORTHOPAEDIC SURGERY

## 2023-09-01 RX ADMIN — ACETAMINOPHEN 1000 MG: 500 TABLET ORAL at 06:46

## 2023-09-01 RX ADMIN — ASPIRIN 325 MG: 325 TABLET, COATED ORAL at 08:31

## 2023-09-01 RX ADMIN — OXYCODONE HYDROCHLORIDE 5 MG: 5 TABLET ORAL at 04:00

## 2023-09-01 RX ADMIN — SENNOSIDES AND DOCUSATE SODIUM 1 TABLET: 50; 8.6 TABLET ORAL at 08:31

## 2023-09-01 RX ADMIN — VENLAFAXINE HYDROCHLORIDE 75 MG: 37.5 CAPSULE, EXTENDED RELEASE ORAL at 06:46

## 2023-09-01 RX ADMIN — VENLAFAXINE HYDROCHLORIDE 150 MG: 150 CAPSULE, EXTENDED RELEASE ORAL at 06:47

## 2023-09-01 RX ADMIN — OXYCODONE HYDROCHLORIDE 5 MG: 5 TABLET ORAL at 08:31

## 2023-09-01 ASSESSMENT — PAIN DESCRIPTION - ORIENTATION
ORIENTATION: RIGHT

## 2023-09-01 ASSESSMENT — PAIN SCALES - GENERAL
PAINLEVEL_OUTOF10: 3
PAINLEVEL_OUTOF10: 6
PAINLEVEL_OUTOF10: 3

## 2023-09-01 ASSESSMENT — PAIN DESCRIPTION - DESCRIPTORS
DESCRIPTORS: ACHING

## 2023-09-01 ASSESSMENT — PAIN DESCRIPTION - FREQUENCY: FREQUENCY: INTERMITTENT

## 2023-09-01 ASSESSMENT — PAIN DESCRIPTION - LOCATION
LOCATION: KNEE

## 2023-09-01 ASSESSMENT — PAIN DESCRIPTION - ONSET: ONSET: ON-GOING

## 2023-09-01 ASSESSMENT — PAIN DESCRIPTION - PAIN TYPE: TYPE: SURGICAL PAIN

## 2023-09-01 ASSESSMENT — PAIN - FUNCTIONAL ASSESSMENT: PAIN_FUNCTIONAL_ASSESSMENT: ACTIVITIES ARE NOT PREVENTED

## 2023-09-01 NOTE — CARE COORDINATION
Medicaid LTSS Screening submitted for processing on the [facility] portal.      ERICH Venegas

## 2023-09-01 NOTE — CARE COORDINATION
Transition of Care Plan - Plan for discharge to [facility] today. Wheelchair van arranged with [facility], ETA 1 PM. 2nd IMM delivered. Facility requesting a UAI as patient has medicaid secondary. CM notified . Discharge folder located on hard chart. RN to follow with discharge papers, [facility] and call report to #[phone]     CM called Jaimemaged Azul at [facility] #[phone], confirmed they can accept patient today. Patient had planned to pay for wheelchair van with [facility], however patient now stating she does not have money with her to pay for transport, and her friend that is helping her has covid and cannot assist with payment. CM [facility] med[facility] #[phone], and was told that the patient does not have transportation benefits. Hospital to assist with cost for transport with [facility], cost is $72.     DION PIÑA, ERICH/CRM

## 2023-09-02 LAB — ECHO BSA: 2.3 M2

## 2023-09-05 NOTE — DISCHARGE SUMMARY
@4ADZL@ [address] [place] [address], [phone]    DISCHARGE SUMMARY     Patient: Uma Ricks [facility] Record Number: [id number]                : 1960  Age: 58 y.o. Admit Date: 2023  Discharge Date: 2023  Admission Diagnosis: Primary osteoarthritis of right knee [M17.11]  Arthritis of right knee [M17.11]  S/P total knee arthroplasty, right [Z96.651]  Discharge Diagnosis: * No post-op diagnosis entered *  Procedures: Procedure(s):  RIGHT TOTAL KNEE ARTHROPLASTY  Surgeon: Annabelle Blount  Assistants: none  Anesthesia: spinal  Complications: None     History of Present Illness:  Antionette Lemus is a 58 y.o. female with a history of Right knee OA pain, swelling, and marked loss of function. Despite conservative management and after clinical and radiographic evaluation, it was determined that she suffered from end-stage osteoarthritis and would benefit from Procedure(s):  RIGHT TOTAL KNEE ARTHROPLASTY, which she consented to undergo after a discussion of the risks, benefits, alternatives, rehab concerns, and potential complications of surgery. Hospital Course:  Antionette Lemus tolerated the procedure well. She was transferred  to the recovery room in stable condition. After a brief stay the patient was then transferred to the Joint Replacement Unit at [address] [place] [address].  On postoperative day #1, the dressing was clean and dry, she was neurovascularly intact. The patient was afebrile and vital signs were stable. Calves were soft and non-tender bilaterally. On postoperative day  # 2, the patient was tolerating a regular diet and making satisfactory progress with physical therapy.   Hemoglobin and INR prior to discharge were   Lab Results   Component Value Date/Time    HGB 11.5 2023 01:24 AM    INR 1.0 2023 01:44 PM   .  Antionette Lemus made satisfactory progress with physical therapy and was

## 2023-09-15 ENCOUNTER — TELEPHONE (OUTPATIENT)
Age: 63
End: 2023-09-15

## 2023-10-16 ENCOUNTER — HOSPITAL ENCOUNTER (OUTPATIENT)
Dept: PHYSICAL THERAPY | Facility: HOSPITAL | Age: 63
Setting detail: RECURRING SERIES
Discharge: HOME | End: 2023-10-19
Payer: MEDICARE

## 2023-10-16 PROCEDURE — 97162 PT EVAL MOD COMPLEX 30 MIN: CPT | Performed by: PHYSICAL THERAPIST

## 2023-10-16 PROCEDURE — 97016 VASOPNEUMATIC DEVICE THERAPY: CPT | Performed by: PHYSICAL THERAPIST

## 2023-10-16 PROCEDURE — 97110 THERAPEUTIC EXERCISES: CPT | Performed by: PHYSICAL THERAPIST

## 2023-10-16 NOTE — THERAPY EVALUATION
procedures   [x] Review HEP    [] Progressed/Changed HEP, detail:    [] Other detail:       Pain Level at end of session (0-10 scale): 4    Plan of Care / Statement of Necessity for Physical Therapy Services     Assessment / key information: Patient reports s/p R TKA performed on 23. She has good ROM return with flexion, and we will emphasize knee extension initially. She also reports ongoing weakness and balance concerns which we will incorporate into our rehab program as well. Evaluation Complexity:  History:  HIGH Complexity :3+ comorbidities / personal factors will impact the outcome/ POC ; Examination:  HIGH Complexity : 4+ Standardized tests and measures addressing body structure, function, activity limitation and / or participation in recreation  ;Presentation:  MEDIUM Complexity : Evolving with changing characteristics  ; Clinical Decision Making:  MEDIUM Complexity : FOTO score of 26-74 Overall Complexity Rating: MEDIUM  Problem List: pain affecting function, decrease ROM, decrease strength, edema affection function, impaired gait/balance, decrease ADL/functional abilities, decrease activity tolerance, and decrease flexibility/joint mobility   Treatment Plan may include any combination of the followin Therapeutic Exercise, 78535 Neuromuscular Re-Education, 46104 Manual Therapy, 88628 Therapeutic Activity, 34218 Self Care/Home Management, 29459 Electrical Stim unattended, 74414 Vasopneumatic Device  (Vasopnuematic compression justification:  Per bilateral girth measures taken and listed above the edema is considered significant and having an impact on the patient's self care and ADL's), and 81358 Gait Training  Patient / Family readiness to learn indicated by: asking questions, trying to perform skills, and interest  Persons(s) to be included in education: patient (P)  Barriers to Learning/Limitations: none  Patient Self Reported Health Status: good  Rehabilitation Potential: good    Short

## 2023-10-19 ENCOUNTER — HOSPITAL ENCOUNTER (OUTPATIENT)
Dept: PHYSICAL THERAPY | Facility: HOSPITAL | Age: 63
Setting detail: RECURRING SERIES
Discharge: HOME | End: 2023-10-22
Payer: MEDICARE

## 2023-10-19 PROCEDURE — 97016 VASOPNEUMATIC DEVICE THERAPY: CPT

## 2023-10-19 PROCEDURE — 97110 THERAPEUTIC EXERCISES: CPT

## 2023-10-19 NOTE — PROGRESS NOTES
Attended,             type/location:       []  w/ice   []  w/heat         []  w/US   []  TENS insruct            min []  Mechanical Traction,        type/lbs:        []  pro      []  sup           []  int       []  cont            []  before manual           []  after manual     min []  Ultrasound,         settings/location:      min  unbilled []  Ice     []  Heat            location/position:    10     min [x]  Vasopneumatic Device,      press/temp:   pre-treatment girth :    post-treatment girth :    measured at (landmark       location) : 34  If using vaso (only need to measure limb vaso being performed on)        min []  Other:      Skin assessment post-treatment (if applicable):    [x]  intact    []  redness- no adverse reaction                 []redness - adverse reaction:          [x]  Patient Education billed concurrently with other procedures   [x] Review HEP    [] Progressed/Changed HEP, detail:    [] Other detail:         Other Objective/Functional Measures  R knee PROM ext -7 deg following manual     Pain Level at end of session (0-10 scale): sore      Assessment   Added knee extension stretching education on progression of time and working on relaxing into stretch for improved knee extension. Patient will continue to benefit from skilled PT / OT services to modify and progress therapeutic interventions, analyze and address functional mobility deficits, analyze and address ROM deficits, analyze and address strength deficits, analyze and address soft tissue restrictions, analyze and cue for proper movement patterns, analyze and modify for postural abnormalities, and instruct in home and community integration to address functional deficits and attain remaining goals. Progress toward goals / Updated goals:  []  See Progress Note/Recertification    Short Term Goals: To be accomplished in 8 treatments.   Pt will be compliant with initial HEP and PT attendance  Pt will be able to achieve -5deg knee

## 2023-10-24 ENCOUNTER — HOSPITAL ENCOUNTER (OUTPATIENT)
Dept: PHYSICAL THERAPY | Facility: HOSPITAL | Age: 63
Setting detail: RECURRING SERIES
Discharge: HOME | End: 2023-10-27
Payer: MEDICARE

## 2023-10-24 PROCEDURE — 97016 VASOPNEUMATIC DEVICE THERAPY: CPT

## 2023-10-24 PROCEDURE — 97110 THERAPEUTIC EXERCISES: CPT

## 2023-10-24 NOTE — PROGRESS NOTES
PHYSICAL THERAPY - MEDICARE DAILY TREATMENT NOTE (updated 3/23)      Date: 10/24/2023          Patient Name:  Gregory Viveros :  1960   Medical   Diagnosis:  Status post total right knee replacement [Z96.651] Treatment Diagnosis:  M25.561  RIGHT KNEE PAIN    Referral Source:  Na Jacobsen MD Insurance:   Payor: MEDICARE / Plan: MEDICARE PART A AND B / Product Type: *No Product type* /                     Patient  verified yes     Visit #   Current  / Total 3 24   Time   In / Out 11:45A 1:00P   Total Treatment Time 75   Total Timed Codes 60   1:1 Treatment Time 48      [facility] Totals Reminder:  bill using total billable   min of TIMED therapeutic procedures and modalities. 8-22 min = 1 unit; 23-37 min = 2 units; 38-52 min = 3 units; 53-67 min = 4 units; 68-82 min = 5 units            SUBJECTIVE    Pain Level (0-10 scale): 2-3/10    Any medication changes, allergies to medications, adverse drug reactions, diagnosis change, or new procedure performed?: [x] No    [] Yes (see summary sheet for update)  Medications: Verified on Patient Summary List    Subjective functional status/changes:     Pt retorted feeling sore after last session but is feeling better now. OBJECTIVE      Therapeutic Procedures: Tx Min Billable or 1:1 Min (if diff from Tx Min) Procedure, Rationale, Specifics   60 [id number] Therapeutic Exercise (timed):  increase ROM, strength, coordination, balance, and proprioception to improve patient's ability to progress to PLOF and address remaining functional goals. (see flow sheet as applicable)     Details if applicable:  PROM R knee extension                       60 53    Total Total         Modalities Rationale:     decrease edema, decrease inflammation, and decrease pain to improve patient's ability to progress to PLOF and address remaining functional goals.        min [] Estim Unattended,             type/location:       []  w/ice    []  w/heat        min [] Estim Attended,

## 2023-10-26 ENCOUNTER — APPOINTMENT (OUTPATIENT)
Dept: PHYSICAL THERAPY | Facility: HOSPITAL | Age: 63
End: 2023-10-26
Payer: MEDICARE

## 2023-10-31 ENCOUNTER — HOSPITAL ENCOUNTER (OUTPATIENT)
Dept: PHYSICAL THERAPY | Facility: HOSPITAL | Age: 63
Setting detail: RECURRING SERIES
Discharge: HOME | End: 2023-11-03
Payer: MEDICARE

## 2023-10-31 PROCEDURE — 97016 VASOPNEUMATIC DEVICE THERAPY: CPT | Performed by: PHYSICAL THERAPIST

## 2023-10-31 PROCEDURE — 97110 THERAPEUTIC EXERCISES: CPT | Performed by: PHYSICAL THERAPIST

## 2023-10-31 NOTE — PROGRESS NOTES
knee extension  Long Term Goals: To be accomplished in 32 treatments. Pt will be able to achieve 0deg knee extension for improved mobility  Pt will be able to achieve at least 115deg knee flexion for improved mobility  Pt will be able to single limb stand for at least 10 seconds on R LE for improved balance  Pt will be able to ambulate in the community without difficulty for improved function  Improved FOTO score to 50 or better to demonstrate improved function     Frequency / Duration: Patient to be seen 1-2 times per week for 32 treatments.     PLAN  Yes  Continue plan of care  Re-Cert Due: 32 visits  [x]  Upgrade activities as tolerated  []  Discharge due to :  []  Other:      Lorrie Vazquez, PT       10/31/2023       1:16 PM

## 2023-11-02 ENCOUNTER — HOSPITAL ENCOUNTER (OUTPATIENT)
Dept: PHYSICAL THERAPY | Facility: HOSPITAL | Age: 63
Setting detail: RECURRING SERIES
Discharge: HOME | End: 2023-11-05
Payer: MEDICARE

## 2023-11-02 PROCEDURE — 97110 THERAPEUTIC EXERCISES: CPT | Performed by: PHYSICAL THERAPIST

## 2023-11-02 PROCEDURE — 97016 VASOPNEUMATIC DEVICE THERAPY: CPT | Performed by: PHYSICAL THERAPIST

## 2023-11-02 NOTE — PROGRESS NOTES
PHYSICAL THERAPY - MEDICARE DAILY TREATMENT NOTE (updated 3/23)      Date: 2023          Patient Name:  Garland Christian :  1960   Medical   Diagnosis:  Status post total right knee replacement [Z96.651] Treatment Diagnosis:  M25.561  RIGHT KNEE PAIN    Referral Source:  Mckenna Rubio MD Insurance:   Payor: MEDICARE / Plan: MEDICARE PART A AND B / Product Type: *No Product type* /                     Patient  verified yes     Visit #   Current  / Total 5 24   Time   In / Out 1055a 1158a   Total Treatment Time 63   Total Timed Codes 53   1:1 Treatment Time 50      [facility] Totals Reminder:  bill using total billable   min of TIMED therapeutic procedures and modalities. 8-22 min = 1 unit; 23-37 min = 2 units; 38-52 min = 3 units; 53-67 min = 4 units; 68-82 min = 5 units        SUBJECTIVE    Pain Level (0-10 scale): 5/10    Any medication changes, allergies to medications, adverse drug reactions, diagnosis change, or new procedure performed?: [x] No    [] Yes (see summary sheet for update)  Medications: Verified on Patient Summary List    Subjective functional status/changes:     Didn't sleep Tuesday night with a lot going on, so slept all day yesterday. Hasn't been able to do her things so the knee is more stiff than usual.     OBJECTIVE    Therapeutic Procedures: Tx Min Billable or 1:1 Min (if diff from Tx Min) Procedure, Rationale, Specifics   53 48 [id number] Therapeutic Exercise (timed):  increase ROM, strength, coordination, balance, and proprioception to improve patient's ability to progress to PLOF and address remaining functional goals. (see flow sheet as applicable)     Details if applicable:  PROM R knee extension/flexion   53 48    Total Total     Modalities Rationale:     decrease edema, decrease inflammation, and decrease pain to improve patient's ability to progress to PLOF and address remaining functional goals.        min [] Estim Unattended,             type/location:       []

## 2023-11-07 ENCOUNTER — HOSPITAL ENCOUNTER (OUTPATIENT)
Dept: PHYSICAL THERAPY | Facility: HOSPITAL | Age: 63
Setting detail: RECURRING SERIES
Discharge: HOME | End: 2023-11-10
Payer: MEDICARE

## 2023-11-07 PROCEDURE — 97110 THERAPEUTIC EXERCISES: CPT | Performed by: PHYSICAL THERAPIST

## 2023-11-07 PROCEDURE — 97016 VASOPNEUMATIC DEVICE THERAPY: CPT | Performed by: PHYSICAL THERAPIST

## 2023-11-07 NOTE — PROGRESS NOTES
PHYSICAL THERAPY - MEDICARE DAILY TREATMENT NOTE (updated 3/23)      Date: 2023          Patient Name:  Sonya Mayberry :  1960   Medical   Diagnosis:  Status post total right knee replacement [Z96.651] Treatment Diagnosis:  M25.561  RIGHT KNEE PAIN    Referral Source:  Andra Garcia MD Insurance:   Payor: MEDICARE / Plan: MEDICARE PART A AND B / Product Type: *No Product type* /                     Patient  verified yes     Visit #   Current  / Total 6 24   Time   In / Out 1101a 1200p   Total Treatment Time 59   Total Timed Codes 44   1:1 Treatment Time 44      [facility] Totals Reminder:  bill using total billable   min of TIMED therapeutic procedures and modalities. 8-22 min = 1 unit; 23-37 min = 2 units; 38-52 min = 3 units; 53-67 min = 4 units; 68-82 min = 5 units        SUBJECTIVE    Pain Level (0-10 scale): 3/10    Any medication changes, allergies to medications, adverse drug reactions, diagnosis change, or new procedure performed?: [x] No    [] Yes (see summary sheet for update)  Medications: Verified on Patient Summary List    Subjective functional status/changes:     Was able to do well with her HEP over the weekend. Knee is feeling pretty good today. OBJECTIVE    Therapeutic Procedures: Tx Min Billable or 1:1 Min (if diff from Tx Min) Procedure, Rationale, Specifics   44 [phone] [id number] Therapeutic Exercise (timed):  increase ROM, strength, coordination, balance, and proprioception to improve patient's ability to progress to PLOF and address remaining functional goals. (see flow sheet as applicable)     Details if applicable:  PROM R knee extension/flexion   44 39    Total Total     Modalities Rationale:     decrease edema, decrease inflammation, and decrease pain to improve patient's ability to progress to PLOF and address remaining functional goals.        min [] Estim Unattended,             type/location:       []  w/ice    []  w/heat        min [] Estim Attended,

## 2023-11-09 ENCOUNTER — HOSPITAL ENCOUNTER (OUTPATIENT)
Dept: PHYSICAL THERAPY | Facility: HOSPITAL | Age: 63
Setting detail: RECURRING SERIES
Discharge: HOME | End: 2023-11-12
Payer: MEDICARE

## 2023-11-09 PROCEDURE — 97016 VASOPNEUMATIC DEVICE THERAPY: CPT | Performed by: PHYSICAL THERAPIST

## 2023-11-09 PROCEDURE — 97110 THERAPEUTIC EXERCISES: CPT | Performed by: PHYSICAL THERAPIST

## 2023-11-09 PROCEDURE — G0283 ELEC STIM OTHER THAN WOUND: HCPCS | Performed by: PHYSICAL THERAPIST

## 2023-11-09 NOTE — PROGRESS NOTES
PHYSICAL THERAPY - MEDICARE DAILY TREATMENT NOTE (updated 3/23)      Date: 2023          Patient Name:  Rayna Messina :  1960   Medical   Diagnosis:  Status post total right knee replacement [Z96.651] Treatment Diagnosis:  M25.561  RIGHT KNEE PAIN    Referral Source:  Garland Smith MD Insurance:   Payor: MEDICARE / Plan: MEDICARE PART A AND B / Product Type: *No Product type* /                     Patient  verified yes     Visit #   Current  / Total 7 24   Time   In / Out 1056a 1200p   Total Treatment Time 64   Total Timed Codes 49   1:1 Treatment Time 40      [facility] Totals Reminder:  bill using total billable   min of TIMED therapeutic procedures and modalities. 8-22 min = 1 unit; 23-37 min = 2 units; 38-52 min = 3 units; 53-67 min = 4 units; 68-82 min = 5 units        SUBJECTIVE    Pain Level (0-10 scale): 3/10    Any medication changes, allergies to medications, adverse drug reactions, diagnosis change, or new procedure performed?: [x] No    [] Yes (see summary sheet for update)  Medications: Verified on Patient Summary List    Subjective functional status/changes:     Doing okay today. OBJECTIVE    Therapeutic Procedures: Tx Min Billable or 1:1 Min (if diff from Tx Min) Procedure, Rationale, Specifics   49 44 [id number] Therapeutic Exercise (timed):  increase ROM, strength, coordination, balance, and proprioception to improve patient's ability to progress to PLOF and address remaining functional goals. (see flow sheet as applicable)     Details if applicable:  PROM R knee extension/flexion   49 44    Total Total     Modalities Rationale:     decrease edema, decrease inflammation, and decrease pain to improve patient's ability to progress to PLOF and address remaining functional goals.        min [] Estim Unattended,             type/location:       []  w/ice    []  w/heat        min [] Estim Attended,             type/location:       []  w/ice   []  w/heat         []  w/US   []

## 2023-11-13 ENCOUNTER — APPOINTMENT (OUTPATIENT)
Dept: PHYSICAL THERAPY | Facility: HOSPITAL | Age: 63
End: 2023-11-13
Payer: MEDICARE

## 2023-11-17 ENCOUNTER — HOSPITAL ENCOUNTER (OUTPATIENT)
Dept: PHYSICAL THERAPY | Facility: HOSPITAL | Age: 63
Setting detail: RECURRING SERIES
Discharge: HOME | End: 2023-11-20
Payer: MEDICARE

## 2023-11-17 PROCEDURE — 97110 THERAPEUTIC EXERCISES: CPT | Performed by: PHYSICAL THERAPIST

## 2023-11-17 PROCEDURE — 97016 VASOPNEUMATIC DEVICE THERAPY: CPT | Performed by: PHYSICAL THERAPIST

## 2023-11-17 NOTE — PROGRESS NOTES
PHYSICAL THERAPY - MEDICARE DAILY TREATMENT NOTE (updated 3/23)      Date: 2023          Patient Name:  Miracle Brock :  1960   Medical   Diagnosis:  Status post total right knee replacement [Z96.651] Treatment Diagnosis:  M25.561  RIGHT KNEE PAIN    Referral Source:  Becki Curran MD Insurance:   Payor: MEDICARE / Plan: MEDICARE PART A AND B / Product Type: *No Product type* /                     Patient  verified yes     Visit #   Current  / Total 8 24   Time   In / Out 1143a 1245p   Total Treatment Time 62   Total Timed Codes 52   1:1 Treatment Time 52      [facility] Totals Reminder:  bill using total billable   min of TIMED therapeutic procedures and modalities. 8-22 min = 1 unit; 23-37 min = 2 units; 38-52 min = 3 units; 53-67 min = 4 units; 68-82 min = 5 units        SUBJECTIVE    Pain Level (0-10 scale): 3/10    Any medication changes, allergies to medications, adverse drug reactions, diagnosis change, or new procedure performed?: [x] No    [] Yes (see summary sheet for update)  Medications: Verified on Patient Summary List    Subjective functional status/changes:     Doing pretty well today. OBJECTIVE    Therapeutic Procedures: Tx Min Billable or 1:1 Min (if diff from Tx Min) Procedure, Rationale, Specifics   52 47 [id number] Therapeutic Exercise (timed):  increase ROM, strength, coordination, balance, and proprioception to improve patient's ability to progress to PLOF and address remaining functional goals. (see flow sheet as applicable)     Details if applicable:  PROM R knee extension/flexion   52 47    Total Total     Modalities Rationale:     decrease edema, decrease inflammation, and decrease pain to improve patient's ability to progress to PLOF and address remaining functional goals.        min [] Estim Unattended,             type/location:       []  w/ice    []  w/heat        min [] Estim Attended,             type/location:       []  w/ice   []  w/heat         []

## 2023-11-21 ENCOUNTER — APPOINTMENT (OUTPATIENT)
Dept: PHYSICAL THERAPY | Facility: HOSPITAL | Age: 63
End: 2023-11-21
Payer: MEDICARE

## 2023-11-28 ENCOUNTER — HOSPITAL ENCOUNTER (OUTPATIENT)
Dept: PHYSICAL THERAPY | Facility: HOSPITAL | Age: 63
Setting detail: RECURRING SERIES
Discharge: HOME | End: 2023-12-01
Payer: MEDICARE

## 2023-11-28 PROCEDURE — 97016 VASOPNEUMATIC DEVICE THERAPY: CPT | Performed by: PHYSICAL THERAPIST

## 2023-11-28 PROCEDURE — 97110 THERAPEUTIC EXERCISES: CPT | Performed by: PHYSICAL THERAPIST

## 2023-11-28 NOTE — PROGRESS NOTES
Physical Therapy at [facility], [facility],   a part of [address] [place] [address] [address], [address]  Phone: [phone]  Fax: [phone]      PHYSICAL THERAPY PROGRESS NOTE  Patient Name:  Suzanne Severino :  1960   Treatment/Medical Diagnosis: Status post total right knee replacement [C29.147]   Referral Source:  Elizabeth Villanueva MD     Date of Initial Visit:  10/16/23 Attended Visits:  9 Missed Visits:  3     SUMMARY OF TREATMENT/ASSESSMENT:  Sumeet Agrawal has made fair progress over the last month of PT, though her ROM improvements have stagnated somewhat in the last week. She reports improvement in her ROM, but still needs to improve with her strength per her reports. She reports about 30% improvement thus far in regards to her knee function. She is continuing to work on better compliance with her HEP as this has been an issue for her. Knee ROM  Flex: 112deg  Ext: -3deg    CURRENT STATUS  Short Term Goals: To be accomplished in 8 treatments. Pt will be compliant with initial HEP and PT attendance MET   Pt will be able to achieve -5deg knee extension MET    Long Term Goals: To be accomplished in 32 treatments. Pt will be able to achieve 0deg knee extension for improved mobility PROGRESSING  Pt will be able to achieve at least 115deg knee flexion for improved mobility PROGRESSING  Pt will be able to single limb stand for at least 10 seconds on R LE for improved balance MINIMAL CHANGE  Pt will be able to ambulate in the community without difficulty for improved function PROGRESSING  Improved FOTO score to 50 or better to demonstrate improved function    RECOMMENDATIONS  Would continue to benefit from further PT in order to promote improved mobility, strength, and balance to improve function. Daylin Lakhani, PT       2023       1:23 PM    If you have any questions/comments please contact us directly at [phone].    Thank you

## 2023-11-28 NOTE — PROGRESS NOTES
PHYSICAL THERAPY - MEDICARE DAILY TREATMENT NOTE (updated 3/23)      Date: 2023          Patient Name:  Ghazala Isaac :  1960   Medical   Diagnosis:  Status post total right knee replacement [Z96.651] Treatment Diagnosis:  M25.561  RIGHT KNEE PAIN    Referral Source:  Melissa Vieyra MD Insurance:   Payor: MEDICARE / Plan: MEDICARE PART A AND B / Product Type: *No Product type* /                     Patient  verified yes     Visit #   Current  / Total 9 24   Time   In / Out 115p 218p   Total Treatment Time 63   Total Timed Codes 53   1:1 Treatment Time 50      [facility] Totals Reminder:  bill using total billable   min of TIMED therapeutic procedures and modalities. 8-22 min = 1 unit; 23-37 min = 2 units; 38-52 min = 3 units; 53-67 min = 4 units; 68-82 min = 5 units        SUBJECTIVE    Pain Level (0-10 scale): 3/10    Any medication changes, allergies to medications, adverse drug reactions, diagnosis change, or new procedure performed?: [x] No    [] Yes (see summary sheet for update)  Medications: Verified on Patient Summary List    Subjective functional status/changes:     Still getting painful days. Some with activity, some with inactivity. OBJECTIVE    Therapeutic Procedures: Tx Min Billable or 1:1 Min (if diff from Tx Min) Procedure, Rationale, Specifics   53 48 [id number] Therapeutic Exercise (timed):  increase ROM, strength, coordination, balance, and proprioception to improve patient's ability to progress to PLOF and address remaining functional goals. (see flow sheet as applicable)     Details if applicable:  PROM R knee extension/flexion   53 48    Total Total     Modalities Rationale:     decrease edema, decrease inflammation, and decrease pain to improve patient's ability to progress to PLOF and address remaining functional goals.        min [] Estim Unattended,             type/location:       []  w/ice    []  w/heat        min [] Estim Attended,             type/location:

## 2023-11-30 ENCOUNTER — HOSPITAL ENCOUNTER (OUTPATIENT)
Dept: PHYSICAL THERAPY | Facility: HOSPITAL | Age: 63
Setting detail: RECURRING SERIES
End: 2023-11-30
Payer: MEDICARE

## 2023-11-30 PROCEDURE — 97016 VASOPNEUMATIC DEVICE THERAPY: CPT | Performed by: PHYSICAL THERAPIST

## 2023-11-30 PROCEDURE — 97110 THERAPEUTIC EXERCISES: CPT | Performed by: PHYSICAL THERAPIST

## 2023-11-30 NOTE — PROGRESS NOTES
PHYSICAL THERAPY - MEDICARE DAILY TREATMENT NOTE (updated 3/23)      Date: 2023          Patient Name:  Garland Christian :  1960   Medical   Diagnosis:  Status post total right knee replacement [Z96.651] Treatment Diagnosis:  M25.561  RIGHT KNEE PAIN    Referral Source:  Mckenna Rubio MD Insurance:   Payor: MEDICARE / Plan: MEDICARE PART A AND B / Product Type: *No Product type* /                     Patient  verified yes     Visit #   Current  / Total 10 24   Time   In / Out 118p 221p   Total Treatment Time 63   Total Timed Codes 53   1:1 Treatment Time 50      [facility] Totals Reminder:  bill using total billable   min of TIMED therapeutic procedures and modalities. 8-22 min = 1 unit; 23-37 min = 2 units; 38-52 min = 3 units; 53-67 min = 4 units; 68-82 min = 5 units        SUBJECTIVE    Pain Level (0-10 scale): 3/10    Any medication changes, allergies to medications, adverse drug reactions, diagnosis change, or new procedure performed?: [x] No    [] Yes (see summary sheet for update)  Medications: Verified on Patient Summary List    Subjective functional status/changes:     Worked really hard yesterday on her knee extension prop. OBJECTIVE    Therapeutic Procedures: Tx Min Billable or 1:1 Min (if diff from Tx Min) Procedure, Rationale, Specifics   53 48 [id number] Therapeutic Exercise (timed):  increase ROM, strength, coordination, balance, and proprioception to improve patient's ability to progress to PLOF and address remaining functional goals. (see flow sheet as applicable)     Details if applicable:  PROM R knee extension/flexion   53 48    Total Total     Modalities Rationale:     decrease edema, decrease inflammation, and decrease pain to improve patient's ability to progress to PLOF and address remaining functional goals.        min [] Estim Unattended,             type/location:       []  w/ice    []  w/heat        min [] Estim Attended,             type/location:       []

## 2023-12-04 ENCOUNTER — APPOINTMENT (OUTPATIENT)
Dept: PHYSICAL THERAPY | Facility: HOSPITAL | Age: 63
End: 2023-12-04
Payer: MEDICARE

## 2023-12-07 ENCOUNTER — HOSPITAL ENCOUNTER (OUTPATIENT)
Dept: PHYSICAL THERAPY | Facility: HOSPITAL | Age: 63
Setting detail: RECURRING SERIES
Discharge: HOME | End: 2023-12-10
Payer: MEDICARE

## 2023-12-07 PROCEDURE — 97110 THERAPEUTIC EXERCISES: CPT | Performed by: PHYSICAL THERAPIST

## 2023-12-07 PROCEDURE — 97140 MANUAL THERAPY 1/> REGIONS: CPT | Performed by: PHYSICAL THERAPIST

## 2023-12-07 PROCEDURE — 97016 VASOPNEUMATIC DEVICE THERAPY: CPT | Performed by: PHYSICAL THERAPIST

## 2023-12-07 NOTE — PROGRESS NOTES
PHYSICAL THERAPY - MEDICARE DAILY TREATMENT NOTE (updated 3/23)      Date: 2023          Patient Name:  Erickson Herrmann :  1960   Medical   Diagnosis:  Status post total right knee replacement [Z96.651] Treatment Diagnosis:  M25.561  RIGHT KNEE PAIN    Referral Source:  Jenna Taylor MD Insurance:   Payor: MEDICARE / Plan: MEDICARE PART A AND B / Product Type: *No Product type* /                     Patient  verified yes     Visit #   Current  / Total 11 24   Time   In / Out 113p 223p   Total Treatment Time 70   Total Timed Codes 60   1:1 Treatment Time 54      [facility] Totals Reminder:  bill using total billable   min of TIMED therapeutic procedures and modalities. 8-22 min = 1 unit; 23-37 min = 2 units; 38-52 min = 3 units; 53-67 min = 4 units; 68-82 min = 5 units        SUBJECTIVE    Pain Level (0-10 scale): 3/10    Any medication changes, allergies to medications, adverse drug reactions, diagnosis change, or new procedure performed?: [x] No    [] Yes (see summary sheet for update)  Medications: Verified on Patient Summary List    Subjective functional status/changes:     Has been a little more sore this week as she was working harder on the exercises at home. OBJECTIVE    Therapeutic Procedures: Tx Min Billable or 1:1 Min (if diff from Tx Min) Procedure, Rationale, Specifics   03 [id number] Manual Therapy (timed):  decrease pain, increase ROM, increase tissue extensibility, and decrease trigger points to improve patient's ability to progress to PLOF and address remaining functional goals. The manual therapy interventions were performed at a separate and distinct time from the therapeutic activities interventions .  (see flow sheet as applicable)    Details if applicable: MFR to popliteal space, seated knee distraction   50 81 [id number] Therapeutic Exercise (timed):  increase ROM, strength, coordination, balance, and proprioception to improve patient's ability to progress to PLOF and

## 2023-12-11 ENCOUNTER — APPOINTMENT (OUTPATIENT)
Dept: PHYSICAL THERAPY | Facility: HOSPITAL | Age: 63
End: 2023-12-11
Payer: MEDICARE

## 2023-12-14 ENCOUNTER — APPOINTMENT (OUTPATIENT)
Dept: PHYSICAL THERAPY | Facility: HOSPITAL | Age: 63
End: 2023-12-14
Payer: MEDICARE

## 2023-12-21 ENCOUNTER — APPOINTMENT (OUTPATIENT)
Dept: PHYSICAL THERAPY | Facility: HOSPITAL | Age: 63
End: 2023-12-21
Payer: MEDICARE

## 2023-12-28 ENCOUNTER — HOSPITAL ENCOUNTER (OUTPATIENT)
Dept: PHYSICAL THERAPY | Facility: HOSPITAL | Age: 63
Setting detail: RECURRING SERIES
Discharge: HOME | End: 2023-12-31
Payer: MEDICARE

## 2023-12-28 PROCEDURE — 97016 VASOPNEUMATIC DEVICE THERAPY: CPT

## 2023-12-28 PROCEDURE — 97110 THERAPEUTIC EXERCISES: CPT

## 2023-12-28 NOTE — PROGRESS NOTES
PHYSICAL THERAPY - MEDICARE DAILY TREATMENT NOTE (updated 3/23)      Date: 2023          Patient Name:  Ngozi Lainez :  1960   Medical   Diagnosis:  Status post total right knee replacement [Z96.651] Treatment Diagnosis:  M25.561  RIGHT KNEE PAIN    Referral Source:  Sotero Flores MD Insurance:   Payor: MEDICARE / Plan: MEDICARE PART A AND B / Product Type: *No Product type* /                     Patient  verified yes     Visit #   Current  / Total 13 24   Time   In / Out 3:02P 4:00P   Total Treatment Time 58   Total Timed Codes 48   1:1 Treatment Time 43      [facility] Totals Reminder:  bill using total billable   min of TIMED therapeutic procedures and modalities.   8-22 min = 1 unit; 23-37 min = 2 units; 38-52 min = 3 units; 53-67 min = 4 units; 68-82 min = 5 units        SUBJECTIVE    Pain Level (0-10 scale): 4/10    Any medication changes, allergies to medications, adverse drug reactions, diagnosis change, or new procedure performed?: [x] No    [] Yes (see summary sheet for update)  Medications: Verified on Patient Summary List    Subjective functional status/changes:     Pt repots knee is doing okay today.     OBJECTIVE    Therapeutic Procedures:  Tx Min Billable or 1:1 Min (if diff from Tx Min) Procedure, Rationale, Specifics   48 43 [id number] Therapeutic Exercise (timed):  increase ROM, strength, coordination, balance, and proprioception to improve patient's ability to progress to PLOF and address remaining functional goals. (see flow sheet as applicable)     Details if applicable:  PROM R knee extension/flexion   48 43    Total Total     Modalities Rationale:     decrease edema, decrease inflammation, and decrease pain to improve patient's ability to progress to PLOF and address remaining functional goals.       min [] Estim Unattended,             type/location:       []  w/ice    []  w/heat        min [] Estim Attended,             type/location:       []  w/ice   []  w/heat

## 2024-01-02 ENCOUNTER — HOSPITAL ENCOUNTER (OUTPATIENT)
Dept: PHYSICAL THERAPY | Facility: HOSPITAL | Age: 64
Setting detail: RECURRING SERIES
Discharge: HOME | End: 2024-01-05
Payer: MEDICARE

## 2024-01-02 PROCEDURE — 97110 THERAPEUTIC EXERCISES: CPT | Performed by: PHYSICAL THERAPIST

## 2024-01-02 PROCEDURE — 97016 VASOPNEUMATIC DEVICE THERAPY: CPT | Performed by: PHYSICAL THERAPIST

## 2024-01-02 NOTE — PROGRESS NOTES
PHYSICAL THERAPY - MEDICARE DAILY TREATMENT NOTE (updated 3/23)      Date: 2024          Patient Name:  Ngozi Lainez :  1960   Medical   Diagnosis:  Status post total right knee replacement [Z96.651] Treatment Diagnosis:  M25.561  RIGHT KNEE PAIN    Referral Source:  Sotero Flores MD Insurance:   Payor: MEDICARE / Plan: MEDICARE PART A AND B / Product Type: *No Product type* /                     Patient  verified yes     Visit #   Current  / Total 14 24   Time   In / Out 216p 316p   Total Treatment Time 60   Total Timed Codes 50   1:1 Treatment Time 50      [facility] Totals Reminder:  bill using total billable   min of TIMED therapeutic procedures and modalities.   8-22 min = 1 unit; 23-37 min = 2 units; 38-52 min = 3 units; 53-67 min = 4 units; 68-82 min = 5 units        SUBJECTIVE    Pain Level (0-10 scale): 4/10    Any medication changes, allergies to medications, adverse drug reactions, diagnosis change, or new procedure performed?: [x] No    [] Yes (see summary sheet for update)  Medications: Verified on Patient Summary List    Subjective functional status/changes:     Pt repots knee has been aching more over the past month.    OBJECTIVE    Therapeutic Procedures:  Tx Min Billable or 1:1 Min (if diff from Tx Min) Procedure, Rationale, Specifics   50  [id number] Therapeutic Exercise (timed):  increase ROM, strength, coordination, balance, and proprioception to improve patient's ability to progress to PLOF and address remaining functional goals. (see flow sheet as applicable)     Details if applicable:  PROM R knee extension/flexion   50     Total Total     Modalities Rationale:     decrease edema, decrease inflammation, and decrease pain to improve patient's ability to progress to PLOF and address remaining functional goals.       min [] Estim Unattended,             type/location:       []  w/ice    []  w/heat        min [] Estim Attended,             type/location:       []  w/ice   []

## 2024-01-04 ENCOUNTER — APPOINTMENT (OUTPATIENT)
Dept: PHYSICAL THERAPY | Facility: HOSPITAL | Age: 64
End: 2024-01-04
Payer: MEDICARE

## 2024-01-09 ENCOUNTER — HOSPITAL ENCOUNTER (OUTPATIENT)
Dept: PHYSICAL THERAPY | Facility: HOSPITAL | Age: 64
Setting detail: RECURRING SERIES
Discharge: HOME | End: 2024-01-12
Payer: MEDICARE

## 2024-01-09 PROCEDURE — 97016 VASOPNEUMATIC DEVICE THERAPY: CPT | Performed by: PHYSICAL THERAPIST

## 2024-01-09 PROCEDURE — 97110 THERAPEUTIC EXERCISES: CPT | Performed by: PHYSICAL THERAPIST

## 2024-01-09 PROCEDURE — 97140 MANUAL THERAPY 1/> REGIONS: CPT | Performed by: PHYSICAL THERAPIST

## 2024-01-09 NOTE — PROGRESS NOTES
PHYSICAL THERAPY - MEDICARE DAILY TREATMENT NOTE (updated 3/23)      Date: 2024          Patient Name:  Ngozi Lainez :  1960   Medical   Diagnosis:  Status post total right knee replacement [Z96.651] Treatment Diagnosis:  M25.561  RIGHT KNEE PAIN    Referral Source:  Sotero Flores MD Insurance:   Payor: MEDICARE / Plan: MEDICARE PART A AND B / Product Type: *No Product type* /                     Patient  verified yes     Visit #   Current  / Total 15 24   Time   In / Out 300p 346p   Total Treatment Time 46   Total Timed Codes 36   1:1 Treatment Time 31      [facility] Totals Reminder:  bill using total billable   min of TIMED therapeutic procedures and modalities.   8-22 min = 1 unit; 23-37 min = 2 units; 38-52 min = 3 units; 53-67 min = 4 units; 68-82 min = 5 units        SUBJECTIVE    Pain Level (0-10 scale): 4/10    Any medication changes, allergies to medications, adverse drug reactions, diagnosis change, or new procedure performed?: [x] No    [] Yes (see summary sheet for update)  Medications: Verified on Patient Summary List    Subjective functional status/changes:     Knee has been a little more sore the last few days. Not sure why. Pain in anterior knee region, and worst with initiating walking.     OBJECTIVE    Therapeutic Procedures:  Tx Min Billable or 1:1 Min (if diff from Tx Min) Procedure, Rationale, Specifics   8  13174 Manual Therapy (timed):  decrease pain, increase tissue extensibility, and decrease trigger points to improve patient's ability to progress to PLOF and address remaining functional goals.  The manual therapy interventions were performed at a separate and distinct time from the therapeutic activities interventions . (see flow sheet as applicable)    Details if applicable:  Scar mobilizations, MFR to patellar tendon and anterior knee, seated knee distraction   28 47 39670 Therapeutic Exercise (timed):  increase ROM, strength, coordination, balance, and

## 2024-01-11 ENCOUNTER — HOSPITAL ENCOUNTER (OUTPATIENT)
Dept: PHYSICAL THERAPY | Facility: HOSPITAL | Age: 64
Setting detail: RECURRING SERIES
Discharge: HOME | End: 2024-01-14
Payer: MEDICARE

## 2024-01-11 PROCEDURE — 97140 MANUAL THERAPY 1/> REGIONS: CPT | Performed by: PHYSICAL THERAPIST

## 2024-01-11 PROCEDURE — 97110 THERAPEUTIC EXERCISES: CPT | Performed by: PHYSICAL THERAPIST

## 2024-01-11 PROCEDURE — 97016 VASOPNEUMATIC DEVICE THERAPY: CPT | Performed by: PHYSICAL THERAPIST

## 2024-01-11 NOTE — PROGRESS NOTES
PHYSICAL THERAPY - MEDICARE DAILY TREATMENT NOTE (updated 3/23)      Date: 2024          Patient Name:  Ngozi Lainez :  1960   Medical   Diagnosis:  Status post total right knee replacement [Z96.651] Treatment Diagnosis:  M25.561  RIGHT KNEE PAIN    Referral Source:  Sotero Flores MD Insurance:   Payor: MEDICARE / Plan: MEDICARE PART A AND B / Product Type: *No Product type* /                     Patient  verified yes     Visit #   Current  / Total 16 24   Time   In / Out 235p 325p   Total Treatment Time 50   Total Timed Codes 40   1:1 Treatment Time 30      MC BC Totals Reminder:  bill using total billable   min of TIMED therapeutic procedures and modalities.   8-22 min = 1 unit; 23-37 min = 2 units; 38-52 min = 3 units; 53-67 min = 4 units; 68-82 min = 5 units        SUBJECTIVE    Pain Level (0-10 scale): 4/10    Any medication changes, allergies to medications, adverse drug reactions, diagnosis change, or new procedure performed?: [x] No    [] Yes (see summary sheet for update)  Medications: Verified on Patient Summary List    Subjective functional status/changes:     Knee is still worse like the other day. Went to using the cane, which is helpful.     OBJECTIVE    Therapeutic Procedures:  Tx Min Billable or 1:1 Min (if diff from Tx Min) Procedure, Rationale, Specifics   20  91241 Manual Therapy (timed):  decrease pain, increase tissue extensibility, and decrease trigger points to improve patient's ability to progress to PLOF and address remaining functional goals.  The manual therapy interventions were performed at a separate and distinct time from the therapeutic activities interventions . (see flow sheet as applicable)    Details if applicable:  Scar mobilizations, MFR to patellar tendon and anterior knee, seated knee distraction   20 10 [id number] Therapeutic Exercise (timed):  increase ROM, strength, coordination, balance, and proprioception to improve patient's ability to

## 2024-01-15 ENCOUNTER — HOSPITAL ENCOUNTER (OUTPATIENT)
Dept: PHYSICAL THERAPY | Facility: HOSPITAL | Age: 64
Setting detail: RECURRING SERIES
Discharge: HOME | End: 2024-01-18
Payer: MEDICARE

## 2024-01-15 PROCEDURE — 97016 VASOPNEUMATIC DEVICE THERAPY: CPT | Performed by: PHYSICAL THERAPIST

## 2024-01-15 PROCEDURE — 97110 THERAPEUTIC EXERCISES: CPT | Performed by: PHYSICAL THERAPIST

## 2024-01-15 PROCEDURE — 97140 MANUAL THERAPY 1/> REGIONS: CPT | Performed by: PHYSICAL THERAPIST

## 2024-01-15 NOTE — PROGRESS NOTES
PHYSICAL THERAPY - MEDICARE DAILY TREATMENT NOTE (updated 3/23)      Date: 1/15/2024          Patient Name:  Ngozi Lainez :  1960   Medical   Diagnosis:  Status post total right knee replacement [Z96.651] Treatment Diagnosis:  M25.561  RIGHT KNEE PAIN    Referral Source:  Sotero Flores MD Insurance:   Payor: MEDICARE / Plan: MEDICARE PART A AND B / Product Type: *No Product type* /                   Patient  verified yes     Visit #   Current  / Total 17 24   Time   In / Out 227p 327p   Total Treatment Time 70   Total Timed Codes 55   1:1 Treatment Time 50      MC BC Totals Reminder:  bill using total billable   min of TIMED therapeutic procedures and modalities.   8-22 min = 1 unit; 23-37 min = 2 units; 38-52 min = 3 units; 53-67 min = 4 units; 68-82 min = 5 units        SUBJECTIVE    Pain Level (0-10 scale): 2-3/10    Any medication changes, allergies to medications, adverse drug reactions, diagnosis change, or new procedure performed?: [x] No    [] Yes (see summary sheet for update)  Medications: Verified on Patient Summary List    Subjective functional status/changes:     Knee is doing a little better than last visit. Did end up feeling better after manual treatment.     OBJECTIVE    Therapeutic Procedures:  Tx Min Billable or 1:1 Min (if diff from Tx Min) Procedure, Rationale, Specifics   15  26392 Manual Therapy (timed):  decrease pain, increase tissue extensibility, and decrease trigger points to improve patient's ability to progress to PLOF and address remaining functional goals.  The manual therapy interventions were performed at a separate and distinct time from the therapeutic activities interventions . (see flow sheet as applicable)    Details if applicable:  Scar mobilizations, MFR to patellar tendon and anterior knee, seated knee distraction   40 35 [id number] Therapeutic Exercise (timed):  increase ROM, strength, coordination, balance, and proprioception to improve patient's

## 2024-01-16 ENCOUNTER — APPOINTMENT (OUTPATIENT)
Dept: PHYSICAL THERAPY | Facility: HOSPITAL | Age: 64
End: 2024-01-16
Payer: MEDICARE

## 2024-01-17 ENCOUNTER — APPOINTMENT (OUTPATIENT)
Dept: PHYSICAL THERAPY | Facility: HOSPITAL | Age: 64
End: 2024-01-17
Payer: MEDICARE

## 2024-02-01 ENCOUNTER — OFFICE VISIT (OUTPATIENT)
Age: 64
End: 2024-02-01
Payer: MEDICARE

## 2024-02-01 VITALS
HEIGHT: 62 IN | BODY MASS INDEX: 46.22 KG/M2 | SYSTOLIC BLOOD PRESSURE: 129 MMHG | TEMPERATURE: 97.6 F | HEART RATE: 94 BPM | OXYGEN SATURATION: 94 % | RESPIRATION RATE: 18 BRPM | DIASTOLIC BLOOD PRESSURE: 82 MMHG | WEIGHT: 251.2 LBS

## 2024-02-01 DIAGNOSIS — Z01.818 PRE-OPERATIVE CLEARANCE: Primary | ICD-10-CM

## 2024-02-01 DIAGNOSIS — Z78.9 WEIGHT ABOVE 97TH PERCENTILE: ICD-10-CM

## 2024-02-01 DIAGNOSIS — G47.33 OSA (OBSTRUCTIVE SLEEP APNEA): ICD-10-CM

## 2024-02-01 DIAGNOSIS — Z01.818 PRE-OPERATIVE CLEARANCE: ICD-10-CM

## 2024-02-01 LAB
BASOPHILS # BLD: 0 K/UL (ref 0–0.1)
BASOPHILS NFR BLD: 0 % (ref 0–1)
DIFFERENTIAL METHOD BLD: NORMAL
EOSINOPHIL # BLD: 0.1 K/UL (ref 0–0.4)
EOSINOPHIL NFR BLD: 1 % (ref 0–7)
ERYTHROCYTE [DISTWIDTH] IN BLOOD BY AUTOMATED COUNT: 14.2 % (ref 11.5–14.5)
HCT VFR BLD AUTO: 41.1 % (ref 35–47)
HGB BLD-MCNC: 13 G/DL (ref 11.5–16)
IMM GRANULOCYTES # BLD AUTO: 0 K/UL (ref 0–0.04)
IMM GRANULOCYTES NFR BLD AUTO: 0 % (ref 0–0.5)
LYMPHOCYTES # BLD: 1.4 K/UL (ref 0.8–3.5)
LYMPHOCYTES NFR BLD: 22 % (ref 12–49)
MCH RBC QN AUTO: 28.6 PG (ref 26–34)
MCHC RBC AUTO-ENTMCNC: 31.6 G/DL (ref 30–36.5)
MCV RBC AUTO: 90.5 FL (ref 80–99)
MONOCYTES # BLD: 0.5 K/UL (ref 0–1)
MONOCYTES NFR BLD: 8 % (ref 5–13)
NEUTS SEG # BLD: 4.2 K/UL (ref 1.8–8)
NEUTS SEG NFR BLD: 69 % (ref 32–75)
NRBC # BLD: 0 K/UL (ref 0–0.01)
NRBC BLD-RTO: 0 PER 100 WBC
PLATELET # BLD AUTO: 223 K/UL (ref 150–400)
PMV BLD AUTO: 10.9 FL (ref 8.9–12.9)
RBC # BLD AUTO: 4.54 M/UL (ref 3.8–5.2)
WBC # BLD AUTO: 6.2 K/UL (ref 3.6–11)

## 2024-02-01 PROCEDURE — 99214 OFFICE O/P EST MOD 30 MIN: CPT | Performed by: INTERNAL MEDICINE

## 2024-02-01 PROCEDURE — G8427 DOCREV CUR MEDS BY ELIG CLIN: HCPCS | Performed by: INTERNAL MEDICINE

## 2024-02-01 PROCEDURE — 1036F TOBACCO NON-USER: CPT | Performed by: INTERNAL MEDICINE

## 2024-02-01 PROCEDURE — G8484 FLU IMMUNIZE NO ADMIN: HCPCS | Performed by: INTERNAL MEDICINE

## 2024-02-01 PROCEDURE — G8417 CALC BMI ABV UP PARAM F/U: HCPCS | Performed by: INTERNAL MEDICINE

## 2024-02-01 PROCEDURE — 3017F COLORECTAL CA SCREEN DOC REV: CPT | Performed by: INTERNAL MEDICINE

## 2024-02-01 RX ORDER — METFORMIN HYDROCHLORIDE 500 MG/1
TABLET, EXTENDED RELEASE ORAL
COMMUNITY
Start: 2023-11-02

## 2024-02-01 RX ORDER — LUMATEPERONE 42 MG/1
CAPSULE ORAL
COMMUNITY
Start: 2023-12-21

## 2024-02-01 SDOH — ECONOMIC STABILITY: HOUSING INSECURITY
IN THE LAST 12 MONTHS, WAS THERE A TIME WHEN YOU DID NOT HAVE A STEADY PLACE TO SLEEP OR SLEPT IN A SHELTER (INCLUDING NOW)?: NO

## 2024-02-01 SDOH — ECONOMIC STABILITY: INCOME INSECURITY: HOW HARD IS IT FOR YOU TO PAY FOR THE VERY BASICS LIKE FOOD, HOUSING, MEDICAL CARE, AND HEATING?: SOMEWHAT HARD

## 2024-02-01 SDOH — ECONOMIC STABILITY: FOOD INSECURITY: WITHIN THE PAST 12 MONTHS, YOU WORRIED THAT YOUR FOOD WOULD RUN OUT BEFORE YOU GOT MONEY TO BUY MORE.: NEVER TRUE

## 2024-02-01 ASSESSMENT — PATIENT HEALTH QUESTIONNAIRE - PHQ9
SUM OF ALL RESPONSES TO PHQ QUESTIONS 1-9: 16
9. THOUGHTS THAT YOU WOULD BE BETTER OFF DEAD, OR OF HURTING YOURSELF: 0
6. FEELING BAD ABOUT YOURSELF - OR THAT YOU ARE A FAILURE OR HAVE LET YOURSELF OR YOUR FAMILY DOWN: 3
SUM OF ALL RESPONSES TO PHQ QUESTIONS 1-9: 16
3. TROUBLE FALLING OR STAYING ASLEEP: 0
SUM OF ALL RESPONSES TO PHQ QUESTIONS 1-9: 16
4. FEELING TIRED OR HAVING LITTLE ENERGY: 3
SUM OF ALL RESPONSES TO PHQ9 QUESTIONS 1 & 2: 6
1. LITTLE INTEREST OR PLEASURE IN DOING THINGS: 3
2. FEELING DOWN, DEPRESSED OR HOPELESS: 3
SUM OF ALL RESPONSES TO PHQ QUESTIONS 1-9: 16
10. IF YOU CHECKED OFF ANY PROBLEMS, HOW DIFFICULT HAVE THESE PROBLEMS MADE IT FOR YOU TO DO YOUR WORK, TAKE CARE OF THINGS AT HOME, OR GET ALONG WITH OTHER PEOPLE: 2
8. MOVING OR SPEAKING SO SLOWLY THAT OTHER PEOPLE COULD HAVE NOTICED. OR THE OPPOSITE, BEING SO FIGETY OR RESTLESS THAT YOU HAVE BEEN MOVING AROUND A LOT MORE THAN USUAL: 0
5. POOR APPETITE OR OVEREATING: 2
7. TROUBLE CONCENTRATING ON THINGS, SUCH AS READING THE NEWSPAPER OR WATCHING TELEVISION: 2

## 2024-02-01 NOTE — PATIENT INSTRUCTIONS
Local care to left breast cyst- moist warm compresses, topical Mupirocon as directed  Please avoid manipulating or squeezing this lesion  Call or return to clinic if these symptoms worsen or fail to improve as anticipated.       Please keep your follow up visit with your new PCP later this month

## 2024-02-01 NOTE — PROGRESS NOTES
Chief Complaint   Patient presents with    Pre-op Exam     Blood pressure 129/82, pulse 94, temperature 97.6 °F (36.4 °C), temperature source Oral, resp. rate 18, height 1.575 m (5' 2\"), weight 113.9 kg (251 lb 3.2 oz), SpO2 94 %.

## 2024-02-01 NOTE — PROGRESS NOTES
Ngozi Lainez is a 63 y.o. female who was seen for pre operative clearance    Assessment & Plan:        There are no medical contraindications to surgery pending review of labs.   -Labs: pending BMP, CBC  Patient to follow up with ENT for otitis post treatment and before her surgery  Continue local care to left breast papule, advised to avoid manipulating   moist warm compresses, topical Mupirocon as directed  Advised to call or return to clinic if these symptoms worsen or fail to improve as anticipated.   Obstructive sleep apnea : continue CPAP  Obesity: she continues to work on weight loss  Osteoarthritis    She will follow up with new PCP later this month as planned for her chronic health problems.       HPI:  Ngozi is a 63 y.o. year old female who presents today for preoperative medical evaluation.     She complains of a left breast cyst x 3 days. Applied Mupirocin ointment to the doroteo last night and it has decreased in size overnight. Patient denies drainage, or fever.   She also reports that she  being treated by ENT for a right ear infection. She was seen yesterday and has a prescription at the pharmacy to . She has a follow up with ENT 2/5/24    Had right TKR 8/29/23  She reports that she did well with the surgery.  She has no other complaints today.     Procedure/Surgery:left first MTPJ fusion,  left 2nd hammertoe repair with pinning   Date of Procedure/Surgery: 2/21/24    Surgeon: Dr. Kaye  [facility]/Surgical Facility:  [facility] patient [facility]/[place]  Latex Allergy: no    No recent use of aspirin (ASA), NSAIDS or steroids  Anesthesia Complications: None  History of abnormal bleeding : None  History of sleep apnea:  yes, uses CPAP  History of Diabetes:  No    Health Care Directive or Living Will: no       Patient Active Problem List   Diagnosis    Arthritis of right knee    Bilateral sciatica    GERD (gastroesophageal reflux disease)    Weight above 97th percentile    Pain and

## 2024-02-02 ENCOUNTER — TELEPHONE (OUTPATIENT)
Age: 64
End: 2024-02-02

## 2024-02-02 LAB
ANION GAP SERPL CALC-SCNC: 6 MMOL/L (ref 5–15)
BUN SERPL-MCNC: 15 MG/DL (ref 6–20)
BUN/CREAT SERPL: 15 (ref 12–20)
CALCIUM SERPL-MCNC: 9.3 MG/DL (ref 8.5–10.1)
CHLORIDE SERPL-SCNC: 108 MMOL/L (ref 97–108)
CO2 SERPL-SCNC: 27 MMOL/L (ref 21–32)
CREAT SERPL-MCNC: 0.97 MG/DL (ref 0.55–1.02)
GLUCOSE SERPL-MCNC: 123 MG/DL (ref 65–100)
POTASSIUM SERPL-SCNC: 4.6 MMOL/L (ref 3.5–5.1)
SODIUM SERPL-SCNC: 141 MMOL/L (ref 136–145)

## 2024-02-02 NOTE — TELEPHONE ENCOUNTER
Faxed pre op to [facility] with Attn: Kerry at [phone].  Confirmation received.  STAT scanned to pt chart.

## 2024-02-23 NOTE — PROGRESS NOTES
PT INITIAL EVALUATION NOTE - [facility] 2-15    Patient Name: Laine Márquez  Date:2022  : 1960  [x]  Patient  Verified  Payor: Brenda Howe / Plan: [place] MEDICARE PART A & B / Product Type: Medicare /    In time: 4754E  Out time: 125p  Total Treatment Time (min): 50  Total Timed Codes (min): 10  1:1 Treatment Time ([address] only): 10   Visit #: 1     Treatment Area: Presence of left artificial knee joint [Z96.652]  Pain in left knee [M25.562]  Other abnormalities of gait and mobility [R26.89]    SUBJECTIVE  Pain Level (0-10 scale): 0  Any medication changes, allergies to medications, adverse drug reactions, diagnosis change, or new procedure performed?: [] No    [x] Yes (see summary sheet for update)  Subjective:    Patient reports with ongoing balance difficulties since having a TKA on 10/25/21. She continues to use a rollator when she will be walking further than a block or when she needs to stand for a long period of time. She also has severe OA in the R knee and has a hard time deciding which is the worse knee. She wants to get better with her balance prior to undergoing a R TKA. No falls, but has had some close calls. She feels the worst is when she reaches out forward. Does report some dizziness when she first stands up if she does it quickly from a prolonged sit. She wants to be able to be more active. She feels she needs to be able to lose 50lbs before she can get rid of the walker. She has 6 steps or a ramp to get into her building.      Patient reports functional limitations with: stairs (down>up), reaching out for objects, using rollator less often    OBJECTIVE/EXAMINATION  Posture:  R knee in flexed position  Gait and Functional Mobility:  Using rollator for ambulation   Able to ambulate without use of assistive device with relatively normal gait pattern, leaning to R side    R Knee AROM 0/20/95deg   L Knee AROM 0/0/95deg     DF ROM: 5deg R, 9deg L    Flexibility: HS/calf tightness noted    LOWER QUARTER   MUSCLE STRENGTH  KEY       R  L  0 - No Contraction  Knee ext  5  4+  1 - Trace            flex  5  4  2 - Poor   Hip ext   --  --  3 - Fair          flex   --  --  4 - Good         abd  --  --  5 - Normal         add  --  --      Ankle DF  5  5                PF  --  --                INV  --  --                EV  5  5  MMT: see above  Neurological: Reflexes / Sensations: nt  Special Tests: BLACKMON/56 - biggest challenges with reaching forward, SLS and tandem stance (L foot back)    10 min Therapeutic Exercise:  [x] See flow sheet : review and implemented HEP   Rationale: increase ROM, balance to improve the patients ability to walk without risk of falls          With   [] TE   [] TA   [] Neuro   [] SC   [] other: Patient Education: [x] Review HEP    [] Progressed/Changed HEP based on:   [] positioning   [] body mechanics   [] transfers   [] heat/ice application    [] other:      Other Objective/Functional Measures: FOTO Functional Measure: 40/100    Pain Level (0-10 scale) post treatment: 0    ASSESSMENT/Changes in Function:     [x]  See Plan of MONIQUE Ramos, DPT 2022 verbal instruction

## 2024-02-25 SDOH — HEALTH STABILITY: PHYSICAL HEALTH: ON AVERAGE, HOW MANY DAYS PER WEEK DO YOU ENGAGE IN MODERATE TO STRENUOUS EXERCISE (LIKE A BRISK WALK)?: 5 DAYS

## 2024-02-25 SDOH — HEALTH STABILITY: PHYSICAL HEALTH: ON AVERAGE, HOW MANY MINUTES DO YOU ENGAGE IN EXERCISE AT THIS LEVEL?: 30 MIN

## 2024-02-26 ENCOUNTER — OFFICE VISIT (OUTPATIENT)
Facility: CLINIC | Age: 64
End: 2024-02-26
Payer: MEDICARE

## 2024-02-26 VITALS
TEMPERATURE: 98.1 F | HEIGHT: 62 IN | OXYGEN SATURATION: 93 % | BODY MASS INDEX: 45.95 KG/M2 | RESPIRATION RATE: 20 BRPM | HEART RATE: 79 BPM | DIASTOLIC BLOOD PRESSURE: 76 MMHG | SYSTOLIC BLOOD PRESSURE: 120 MMHG

## 2024-02-26 DIAGNOSIS — M15.9 PRIMARY OSTEOARTHRITIS INVOLVING MULTIPLE JOINTS: ICD-10-CM

## 2024-02-26 DIAGNOSIS — R73.9 HYPERGLYCEMIA: ICD-10-CM

## 2024-02-26 DIAGNOSIS — Z12.11 SCREEN FOR COLON CANCER: ICD-10-CM

## 2024-02-26 DIAGNOSIS — E03.9 ACQUIRED HYPOTHYROIDISM: ICD-10-CM

## 2024-02-26 DIAGNOSIS — E78.00 PURE HYPERCHOLESTEROLEMIA: ICD-10-CM

## 2024-02-26 DIAGNOSIS — K21.9 GASTROESOPHAGEAL REFLUX DISEASE WITHOUT ESOPHAGITIS: ICD-10-CM

## 2024-02-26 DIAGNOSIS — Z00.00 MEDICARE ANNUAL WELLNESS VISIT, SUBSEQUENT: Primary | ICD-10-CM

## 2024-02-26 DIAGNOSIS — F31.4 BIPOLAR I DISORDER, MOST RECENT EPISODE DEPRESSED, SEVERE WITHOUT PSYCHOTIC FEATURES (HCC): ICD-10-CM

## 2024-02-26 DIAGNOSIS — E66.01 MORBID OBESITY (HCC): ICD-10-CM

## 2024-02-26 DIAGNOSIS — E55.9 VITAMIN D DEFICIENCY: ICD-10-CM

## 2024-02-26 DIAGNOSIS — E03.9 HYPOTHYROIDISM, UNSPECIFIED TYPE: ICD-10-CM

## 2024-02-26 DIAGNOSIS — Z76.89 ENCOUNTER TO ESTABLISH CARE: ICD-10-CM

## 2024-02-26 PROBLEM — M75.111 INCOMPLETE TEAR OF RIGHT ROTATOR CUFF: Status: RESOLVED | Noted: 2021-11-05 | Resolved: 2024-02-26

## 2024-02-26 PROBLEM — Z96.651 S/P TOTAL KNEE ARTHROPLASTY, RIGHT: Status: RESOLVED | Noted: 2023-08-29 | Resolved: 2024-02-26

## 2024-02-26 PROBLEM — Z78.9 WEIGHT ABOVE 97TH PERCENTILE: Status: RESOLVED | Noted: 2021-11-05 | Resolved: 2024-02-26

## 2024-02-26 PROBLEM — M79.672 LEFT FOOT PAIN: Status: RESOLVED | Noted: 2021-11-05 | Resolved: 2024-02-26

## 2024-02-26 PROBLEM — M25.559 HIP PAIN: Status: RESOLVED | Noted: 2021-11-05 | Resolved: 2024-02-26

## 2024-02-26 PROCEDURE — 3017F COLORECTAL CA SCREEN DOC REV: CPT | Performed by: INTERNAL MEDICINE

## 2024-02-26 PROCEDURE — 36415 COLL VENOUS BLD VENIPUNCTURE: CPT | Performed by: INTERNAL MEDICINE

## 2024-02-26 PROCEDURE — G8484 FLU IMMUNIZE NO ADMIN: HCPCS | Performed by: INTERNAL MEDICINE

## 2024-02-26 PROCEDURE — G0439 PPPS, SUBSEQ VISIT: HCPCS | Performed by: INTERNAL MEDICINE

## 2024-02-26 SDOH — ECONOMIC STABILITY: TRANSPORTATION INSECURITY
IN THE PAST 12 MONTHS, HAS THE LACK OF TRANSPORTATION KEPT YOU FROM MEDICAL APPOINTMENTS OR FROM GETTING MEDICATIONS?: NO

## 2024-02-26 SDOH — ECONOMIC STABILITY: TRANSPORTATION INSECURITY
IN THE PAST 12 MONTHS, HAS LACK OF TRANSPORTATION KEPT YOU FROM MEETINGS, WORK, OR FROM GETTING THINGS NEEDED FOR DAILY LIVING?: NO

## 2024-02-26 SDOH — ECONOMIC STABILITY: INCOME INSECURITY: IN THE LAST 12 MONTHS, WAS THERE A TIME WHEN YOU WERE NOT ABLE TO PAY THE MORTGAGE OR RENT ON TIME?: NO

## 2024-02-26 SDOH — ECONOMIC STABILITY: HOUSING INSECURITY: IN THE LAST 12 MONTHS, HOW MANY PLACES HAVE YOU LIVED?: 1

## 2024-02-26 ASSESSMENT — PATIENT HEALTH QUESTIONNAIRE - PHQ9
9. THOUGHTS THAT YOU WOULD BE BETTER OFF DEAD, OR OF HURTING YOURSELF: 0
1. LITTLE INTEREST OR PLEASURE IN DOING THINGS: 0
SUM OF ALL RESPONSES TO PHQ QUESTIONS 1-9: 0
7. TROUBLE CONCENTRATING ON THINGS, SUCH AS READING THE NEWSPAPER OR WATCHING TELEVISION: 0
SUM OF ALL RESPONSES TO PHQ9 QUESTIONS 1 & 2: 0
8. MOVING OR SPEAKING SO SLOWLY THAT OTHER PEOPLE COULD HAVE NOTICED. OR THE OPPOSITE, BEING SO FIGETY OR RESTLESS THAT YOU HAVE BEEN MOVING AROUND A LOT MORE THAN USUAL: 0
6. FEELING BAD ABOUT YOURSELF - OR THAT YOU ARE A FAILURE OR HAVE LET YOURSELF OR YOUR FAMILY DOWN: 0
SUM OF ALL RESPONSES TO PHQ QUESTIONS 1-9: 0
5. POOR APPETITE OR OVEREATING: 0
10. IF YOU CHECKED OFF ANY PROBLEMS, HOW DIFFICULT HAVE THESE PROBLEMS MADE IT FOR YOU TO DO YOUR WORK, TAKE CARE OF THINGS AT HOME, OR GET ALONG WITH OTHER PEOPLE: 0
4. FEELING TIRED OR HAVING LITTLE ENERGY: 0
3. TROUBLE FALLING OR STAYING ASLEEP: 0
2. FEELING DOWN, DEPRESSED OR HOPELESS: 0

## 2024-02-26 ASSESSMENT — LIFESTYLE VARIABLES
HOW OFTEN DO YOU HAVE A DRINK CONTAINING ALCOHOL: NEVER
HOW MANY STANDARD DRINKS CONTAINING ALCOHOL DO YOU HAVE ON A TYPICAL DAY: PATIENT DOES NOT DRINK

## 2024-02-26 NOTE — PATIENT INSTRUCTIONS
a good choice. Bit by bit, increase the amount you walk every day. Try for at least 30 minutes on most days of the week. You also may want to swim, bike, or do other activities.     Do not smoke. If you need help quitting, talk to your doctor about stop-smoking programs and medicines. These can increase your chances of quitting for good. Quitting smoking may be the most important step you can take to protect your heart. It is never too late to quit.     Limit alcohol to 2 drinks a day for men and 1 drink a day for women. Too much alcohol can cause health problems.     Manage other health problems such as diabetes, high blood pressure, and high cholesterol. If you think you may have a problem with alcohol or drug use, talk to your doctor.   Medicines    Take your medicines exactly as prescribed. Call your doctor if you think you are having a problem with your medicine.     If your doctor recommends aspirin, take the amount directed each day. Make sure you take aspirin and not another kind of pain reliever, such as acetaminophen (Tylenol).   When should you call for help?   Call 911 if you have symptoms of a heart attack. These may include:    Chest pain or pressure, or a strange feeling in the chest.     Sweating.     Shortness of breath.     Pain, pressure, or a strange feeling in the back, neck, jaw, or upper belly or in one or both shoulders or arms.     Lightheadedness or sudden weakness.     A fast or irregular heartbeat.   After you call 911, the  may tell you to chew 1 adult-strength or 2 to 4 low-dose aspirin. Wait for an ambulance. Do not try to drive yourself.  Watch closely for changes in your health, and be sure to contact your doctor if you have any problems.  Where can you learn more?  Go to https://www.[employer].net/patientEd and enter F075 to learn more about \"A Healthy Heart: Care Instructions.\"  Current as of: June 25, 2023               Content Version: 13.9  © 3857-3959 Healthwise,

## 2024-02-26 NOTE — PROGRESS NOTES
Medicare Annual Wellness Visit    Ngozi Lainez is here for Medicare AWV (Patient here to Annual Medicare Wellness Exam and to Establish Care. Former patient of Dr. Ashkan Jason.  She reports left foot pain 7/10 from post Bunionectomy and Reconstruction. )    Assessment & Plan   Medicare annual wellness visit, subsequent  Recommendations for Preventive Services Due: see orders and patient instructions/AVS.  Recommended screening schedule for the next 5-10 years is provided to the patient in written form: see Patient Instructions/AVS.     No follow-ups on file.     Subjective   The following acute and/or chronic problems were also addressed today:      Patient's complete Health Risk Assessment and screening values have been reviewed and are found in Flowsheets. The following problems were reviewed today and where indicated follow up appointments were made and/or referrals ordered.    Positive Risk Factor Screenings with Interventions:    Fall Risk:  Do you feel unsteady or are you worried about falling? : (!) yes  2 or more falls in past year?: no  Fall with injury in past year?: no     Interventions:    Reviewed medications, home hazards, visual acuity, and co-morbidities that can increase risk for falls  See A/P for plan and any pertinent orders            General HRA Questions:  Select all that apply: (!) New or Increased Pain, Stress    Pain Interventions:  See A/P for plan and any pertinent orders    Stress Interventions:  See A/P for plan and any pertinent orders      Activity, Diet, and Weight:  On average, how many days per week do you engage in moderate to strenuous exercise (like a brisk walk)?: 5 days  On average, how many minutes do you engage in exercise at this level?: 30 min    Do you eat balanced/healthy meals regularly?: (!) No    Body mass index is 45.95 kg/m². (!) Abnormal    Do you eat balanced/healthy meals regularly Interventions:  See A/P for plan and any pertinent orders  Obesity 
Ngozi Lainez is a 63 y.o. female  Chief Complaint   Patient presents with    Medicare AWV     Patient here to Annual Medicare Wellness Exam and to Establish Care. Former patient of Dr. Ashkan Jason.  She reports left foot pain 7/10 from post Bunionectomy and Reconstruction.          YES Answers must have Comments  1. \"Have you been to the ER, urgent care clinic since your last visit?  Hospitalized since your last visit?\"    [x] YES When: 5 days ago Where: [facility] [place] [place] Reason for visit: Bunionectomy and foot reconstruction  [] NO       2. “Have you seen or consulted any other health care providers outside of [facility] since your last visit?”    [] YES   [x] NO       3.  For patients aged 45-75: “Have you had a colorectal cancer screening such as a colonoscopy/FIT/Cologuard?  Nurse/CMA to request records if not in chart   [] YES   [x] NO   [] NA, based on age    If the patient is female:      4. For female patients aged 40-74: “Have you had a mammogram in the last two years?” Nurse/CMA to request records if not in chart   [x] YES When: 2023  [] NO   [] NA, based on age    5.  For female patients aged 21-65: “Have you had a pap smear?”  Nurse/CMA to request records if not in chart   [x] YES 2023  [] NO  [] NA, based on age  
tinnitus  Respiratory: negative for - cough, hemoptysis, shortness of breath or wheezing  Cardiovascular : negative for - chest pain, edema, palpitations or shortness of breath  Gastrointestinal: negative for - abdominal pain, blood in stools, heartburn or nausea/vomiting  Genito-Urinary: no dysuria, trouble voiding, or hematuria  Musculoskeletal: negative for - gait disturbance, joint pain, joint stiffness or joint swelling  Neurological: no TIA or stroke symptoms  Hematologic: no bruises, no bleeding, no swollen glands  Integument: no lumps, mole changes, nail changes or rash  Endocrine: no malaise/lethargy or unexpected weight changes      Social History     Socioeconomic History    Marital status: Single     Spouse name: None    Number of children: None    Years of education: None    Highest education level: None   Tobacco Use    Smoking status: Never     Passive exposure: Never    Smokeless tobacco: Never   Vaping Use    Vaping Use: Never used   Substance and Sexual Activity    Alcohol use: No    Drug use: No    Sexual activity: Not Currently     Partners: Male     Birth control/protection: Abstinence     Social Determinants of Health     Financial Resource Strain: Medium Risk (2/1/2024)    Overall Financial Resource Strain (CARDIA)     Difficulty of Paying Living Expenses: Somewhat hard   Food Insecurity: No Food Insecurity (2/1/2024)    Hunger Vital Sign     Worried About Running Out of Food in the Last Year: Never true     Ran Out of Food in the Last Year: Never true   Transportation Needs: No Transportation Needs (2/26/2024)    PRAPARE - Transportation     Lack of Transportation (Medical): No     Lack of Transportation (Non-Medical): No   Physical Activity: Sufficiently Active (2/26/2024)    Exercise Vital Sign     Days of Exercise per Week: 5 days     Minutes of Exercise per Session: 30 min   Stress: Stress Concern Present (2/26/2024)    [employer] - Occupational Stress

## 2024-02-27 LAB
25(OH)D3 SERPL-MCNC: 39.3 NG/ML (ref 30–100)
ALBUMIN SERPL-MCNC: 3.2 G/DL (ref 3.5–5)
ALBUMIN/GLOB SERPL: 1.1 (ref 1.1–2.2)
ALP SERPL-CCNC: 122 U/L (ref 45–117)
ALT SERPL-CCNC: 43 U/L (ref 12–78)
ANION GAP SERPL CALC-SCNC: 5 MMOL/L (ref 5–15)
APPEARANCE UR: CLEAR
AST SERPL-CCNC: 19 U/L (ref 15–37)
BACTERIA URNS QL MICRO: ABNORMAL /HPF
BASOPHILS # BLD: 0 K/UL (ref 0–0.1)
BASOPHILS NFR BLD: 0 % (ref 0–1)
BILIRUB SERPL-MCNC: 0.3 MG/DL (ref 0.2–1)
BILIRUB UR QL: NEGATIVE
BUN SERPL-MCNC: 15 MG/DL (ref 6–20)
BUN/CREAT SERPL: 17 (ref 12–20)
CALCIUM SERPL-MCNC: 9.1 MG/DL (ref 8.5–10.1)
CHLORIDE SERPL-SCNC: 110 MMOL/L (ref 97–108)
CHOLEST SERPL-MCNC: 171 MG/DL
CO2 SERPL-SCNC: 29 MMOL/L (ref 21–32)
COLOR UR: ABNORMAL
CREAT SERPL-MCNC: 0.9 MG/DL (ref 0.55–1.02)
DIFFERENTIAL METHOD BLD: NORMAL
EOSINOPHIL # BLD: 0.1 K/UL (ref 0–0.4)
EOSINOPHIL NFR BLD: 2 % (ref 0–7)
EPITH CASTS URNS QL MICRO: ABNORMAL /LPF
ERYTHROCYTE [DISTWIDTH] IN BLOOD BY AUTOMATED COUNT: 13.9 % (ref 11.5–14.5)
EST. AVERAGE GLUCOSE BLD GHB EST-MCNC: 105 MG/DL
GLOBULIN SER CALC-MCNC: 3 G/DL (ref 2–4)
GLUCOSE SERPL-MCNC: 105 MG/DL (ref 65–100)
GLUCOSE UR STRIP.AUTO-MCNC: NEGATIVE MG/DL
HBA1C MFR BLD: 5.3 % (ref 4–5.6)
HCT VFR BLD AUTO: 38.5 % (ref 35–47)
HDLC SERPL-MCNC: 34 MG/DL
HDLC SERPL: 5 (ref 0–5)
HGB BLD-MCNC: 12.2 G/DL (ref 11.5–16)
HGB UR QL STRIP: NEGATIVE
HYALINE CASTS URNS QL MICRO: ABNORMAL /LPF (ref 0–5)
IMM GRANULOCYTES # BLD AUTO: 0 K/UL (ref 0–0.04)
IMM GRANULOCYTES NFR BLD AUTO: 0 % (ref 0–0.5)
KETONES UR QL STRIP.AUTO: NEGATIVE MG/DL
LDLC SERPL CALC-MCNC: 101.6 MG/DL (ref 0–100)
LEUKOCYTE ESTERASE UR QL STRIP.AUTO: NEGATIVE
LYMPHOCYTES # BLD: 1 K/UL (ref 0.8–3.5)
LYMPHOCYTES NFR BLD: 20 % (ref 12–49)
MCH RBC QN AUTO: 28.7 PG (ref 26–34)
MCHC RBC AUTO-ENTMCNC: 31.7 G/DL (ref 30–36.5)
MCV RBC AUTO: 90.6 FL (ref 80–99)
MONOCYTES # BLD: 0.5 K/UL (ref 0–1)
MONOCYTES NFR BLD: 9 % (ref 5–13)
NEUTS SEG # BLD: 3.6 K/UL (ref 1.8–8)
NEUTS SEG NFR BLD: 69 % (ref 32–75)
NITRITE UR QL STRIP.AUTO: NEGATIVE
NRBC # BLD: 0 K/UL (ref 0–0.01)
NRBC BLD-RTO: 0 PER 100 WBC
PH UR STRIP: 5.5 (ref 5–8)
PLATELET # BLD AUTO: 242 K/UL (ref 150–400)
PMV BLD AUTO: 10.9 FL (ref 8.9–12.9)
POTASSIUM SERPL-SCNC: 5 MMOL/L (ref 3.5–5.1)
PROT SERPL-MCNC: 6.2 G/DL (ref 6.4–8.2)
PROT UR STRIP-MCNC: NEGATIVE MG/DL
RBC # BLD AUTO: 4.25 M/UL (ref 3.8–5.2)
RBC #/AREA URNS HPF: ABNORMAL /HPF (ref 0–5)
SODIUM SERPL-SCNC: 144 MMOL/L (ref 136–145)
SP GR UR REFRACTOMETRY: 1.01 (ref 1–1.03)
T4 FREE SERPL-MCNC: 1.1 NG/DL (ref 0.8–1.5)
TRIGL SERPL-MCNC: 177 MG/DL
TSH SERPL DL<=0.05 MIU/L-ACNC: 3.46 UIU/ML (ref 0.36–3.74)
UROBILINOGEN UR QL STRIP.AUTO: 0.2 EU/DL (ref 0.2–1)
VLDLC SERPL CALC-MCNC: 35.4 MG/DL
WBC # BLD AUTO: 5.2 K/UL (ref 3.6–11)
WBC URNS QL MICRO: ABNORMAL /HPF (ref 0–4)

## 2024-07-01 ENCOUNTER — OFFICE VISIT (OUTPATIENT)
Facility: CLINIC | Age: 64
End: 2024-07-01
Payer: MEDICARE

## 2024-07-01 VITALS
TEMPERATURE: 98 F | DIASTOLIC BLOOD PRESSURE: 84 MMHG | WEIGHT: 257.9 LBS | HEART RATE: 80 BPM | BODY MASS INDEX: 47.46 KG/M2 | OXYGEN SATURATION: 98 % | RESPIRATION RATE: 18 BRPM | HEIGHT: 62 IN | SYSTOLIC BLOOD PRESSURE: 135 MMHG

## 2024-07-01 DIAGNOSIS — M25.562 BILATERAL CHRONIC KNEE PAIN: ICD-10-CM

## 2024-07-01 DIAGNOSIS — R73.02 IGT (IMPAIRED GLUCOSE TOLERANCE): ICD-10-CM

## 2024-07-01 DIAGNOSIS — M51.37 DDD (DEGENERATIVE DISC DISEASE), LUMBOSACRAL: ICD-10-CM

## 2024-07-01 DIAGNOSIS — E78.00 PURE HYPERCHOLESTEROLEMIA: ICD-10-CM

## 2024-07-01 DIAGNOSIS — M15.9 PRIMARY OSTEOARTHRITIS INVOLVING MULTIPLE JOINTS: ICD-10-CM

## 2024-07-01 DIAGNOSIS — M25.561 BILATERAL CHRONIC KNEE PAIN: ICD-10-CM

## 2024-07-01 DIAGNOSIS — G89.29 BILATERAL CHRONIC KNEE PAIN: ICD-10-CM

## 2024-07-01 DIAGNOSIS — E66.01 MORBID OBESITY (HCC): Primary | ICD-10-CM

## 2024-07-01 PROCEDURE — 1123F ACP DISCUSS/DSCN MKR DOCD: CPT | Performed by: INTERNAL MEDICINE

## 2024-07-01 PROCEDURE — G8399 PT W/DXA RESULTS DOCUMENT: HCPCS | Performed by: INTERNAL MEDICINE

## 2024-07-01 PROCEDURE — G8427 DOCREV CUR MEDS BY ELIG CLIN: HCPCS | Performed by: INTERNAL MEDICINE

## 2024-07-01 PROCEDURE — G8417 CALC BMI ABV UP PARAM F/U: HCPCS | Performed by: INTERNAL MEDICINE

## 2024-07-01 PROCEDURE — 3017F COLORECTAL CA SCREEN DOC REV: CPT | Performed by: INTERNAL MEDICINE

## 2024-07-01 PROCEDURE — 1090F PRES/ABSN URINE INCON ASSESS: CPT | Performed by: INTERNAL MEDICINE

## 2024-07-01 PROCEDURE — 36415 COLL VENOUS BLD VENIPUNCTURE: CPT | Performed by: INTERNAL MEDICINE

## 2024-07-01 PROCEDURE — 99214 OFFICE O/P EST MOD 30 MIN: CPT | Performed by: INTERNAL MEDICINE

## 2024-07-01 PROCEDURE — 1036F TOBACCO NON-USER: CPT | Performed by: INTERNAL MEDICINE

## 2024-07-01 RX ORDER — LAMOTRIGINE 200 MG/1
200 TABLET ORAL DAILY
COMMUNITY
Start: 2024-06-25

## 2024-07-01 RX ORDER — VENLAFAXINE HYDROCHLORIDE 37.5 MG/1
37.5 CAPSULE, EXTENDED RELEASE ORAL DAILY
COMMUNITY
Start: 2024-06-25

## 2024-07-01 NOTE — PROGRESS NOTES
Chief Complaint   Patient presents with    Follow-up    Hypertension     Patient here for follow up high blood pressure.      \"Have you been to the ER, urgent care clinic since your last visit?  Hospitalized since your last visit?\"    YES - When: approximately 1 days ago.  Where and Why: [facility].    “Have you seen or consulted any other health care providers outside of [facility] since your last visit?”    NO     “Have you had a pap smear?”    NO    No cervical cancer screening on file         “Have you had a colorectal cancer screening such as a colonoscopy/FIT/Cologuard?    NO    Date of last Colonoscopy: 9/20/2012  No cologuard on file  No FIT/FOBT on file   No flexible sigmoidoscopy on file         Click Here for Release of Records Request    
clarifications as needed.

## 2024-07-02 LAB
EST. AVERAGE GLUCOSE BLD GHB EST-MCNC: 97 MG/DL
HBA1C MFR BLD: 5 % (ref 4–5.6)

## 2024-08-09 NOTE — PROGRESS NOTES
Slime Roy (: 1960) is a 60 y.o. female, patient, here for evaluation of the following chief complaint(s):  Knee Pain (Right knee )       HPI:    Nicolasa John on right arthritic knee. Wanted to ensure that she did not break anything so presents today for x-rays. Chief complaint is right knee pain. Allergies   Allergen Reactions    Flagyl [Metronidazole] Other (comments)     HEADACHES AND MENTAL DISTRESS       Current Outpatient Medications   Medication Sig    levothyroxine (SYNTHROID) 25 mcg tablet Take 1 Tablet by mouth nightly. cholecalciferol, vitamin D3, 50 mcg (2,000 unit) tab Take 4,000 Units by mouth nightly. venlafaxine (EFFEXOR) 75 mg tablet Take 75 mg by mouth daily. PT TAKES A TOTAL  mg EVERY MORNING    venlafaxine-SR (EFFEXOR-XR) 150 mg capsule Take 150 mg by mouth daily. PT TAKES A TOTAL OF 225mg EVERY MORNING    risperiDONE (RISPERDAL) 3 mg tablet Take 3 mg by mouth nightly. cpap machine kit by Does Not Apply route. Dx 327.23    TRAZODONE HCL (TRAZODONE PO) Take 50 mg by mouth nightly as needed. lamoTRIgine (LaMICtal) 100 mg tablet Take 200 mg by mouth nightly. No current facility-administered medications for this visit. Past Medical History:   Diagnosis Date    Anxiety and depression     [facility]    Bipolar Disorder     [facility]    Chronic pain     LEFT KNEE    DDD (degenerative disc disease), lumbosacral     L5-S1 ([facility])    Depression     Fatty liver 2018    Foot fracture, right     h/o    Genital herpes     GERD (gastroesophageal reflux disease)     History of gastritis     Dr. Eason [place]    Hyperlipidemia     Hypothyroidism (acquired)     Menopause     LMP- 51years old?     OA (osteoarthritis)     knees, Dr. Yasmine Luis    Obesity     NIKOLE on CPAP     adherent with CPAP use    Symptomatic cholelithiasis 10/18/2017        Past Surgical History:   Procedure Laterality Date    HX COLONOSCOPY  2012    colon [Benefits of weight loss discussed] : Benefits of weight loss discussed polyp, repeat in 5 yrs, Dr. Abdi Shadow  2016    gastritis, hiatal hernia (Dr. Tata Black)    C/ Chr[address]ie [address]  10/25/2021    HX KNEE REPLACEMENT  10/25/2021    HX LAP CHOLECYSTECTOMY  10/18/2018    Laparoscopic cholecystectomy by Dr. Quinn Mendoza.     HX OTHER SURGICAL      cyst removed from scalp - benign       Family History   Problem Relation Age of Onset    Heart Disease Mother     Pulmonary Embolism Mother     Lung Disease Father         emphysema, COPD, Asbestosis    Diabetes Father         onset with age    Heart Disease Father     COPD Father     Alcohol abuse Father     Seizures Brother     Mental Retardation Brother     Cancer Maternal Grandmother         ovarian    Diabetes Paternal Uncle     Diabetes Paternal Grandfather         type 2    Colon Cancer Other         materal grandmothers siblings x 3-4  of colon cancer    Celiac Disease Brother     Heart Disease Maternal Grandfather     Other Sister         PERIPHERAL ARTERY DISEASE    No Known Problems Sister     Anesth Problems Neg Hx         Social History     Socioeconomic History    Marital status: SINGLE     Spouse name: Not on file    Number of children: Not on file    Years of education: Not on file    Highest education level: Not on file   Occupational History    Not on file   Tobacco Use    Smoking status: Never    Smokeless tobacco: Never   Vaping Use    Vaping Use: Never used   Substance and Sexual Activity    Alcohol use: No    Drug use: No     Comment: prescriptions    Sexual activity: Not Currently     Partners: Male     Birth control/protection: Abstinence   Other Topics Concern    Not on file   Social History Narrative    Not on file     Social Determinants of Health     Financial Resource Strain: Not on file   Food Insecurity: Not on file   Transportation Needs: Not on file   Physical Activity: Not on file   Stress: Not on file   Social Connections: Not on file   Intimate Partner Violence: Not on file   Housing Stability: Not on file             Vitals: There were no vitals taken for this visit. There is no height or weight on file to calculate BMI. PHYSICAL EXAM:  On exam she is able to initiate extension with about a 5 degree flexion contracture. Flexes 90 degrees. No knee effusion. No skin breaks or areas of severe tenderness. IMAGING:  XR Results (most recent):  Results from Appointment encounter on 04/05/23    XR KNEE RT 3 V    Narrative  Right knee 3 views. Severe DJD no fractures are noted. Bone-on-bone in all 3 compartments with large osteophytes and subchondral cyst.  Stage IV DJD. ASSESSMENT/PLAN:  1. Acute pain of right knee  -     XR KNEE RT 3 V; Future  -     REFERRAL TO PHYSICAL THERAPY  2. Primary osteoarthritis of right knee  3. Contusion of knee and lower leg, right, initial encounter    Referred to physical therapy. Did not see a fracture. Reassured the patient. Need her to get moving though prior to her upcoming surgery. An electronic signature was used to authenticate this note.   --Heena Longoria MD [Encouraged to maintain food diary] : Encouraged to maintain food diary [Encouraged to increase physical activity] : Encouraged to increase physical activity [Decrease Portions] : decrease portions [Keep Food Diary] : keep food diary [FreeTextEntry4] : 15

## 2024-11-04 ENCOUNTER — OFFICE VISIT (OUTPATIENT)
Facility: CLINIC | Age: 64
End: 2024-11-04
Payer: COMMERCIAL

## 2024-11-04 VITALS
OXYGEN SATURATION: 98 % | HEART RATE: 82 BPM | DIASTOLIC BLOOD PRESSURE: 80 MMHG | RESPIRATION RATE: 18 BRPM | WEIGHT: 262.5 LBS | SYSTOLIC BLOOD PRESSURE: 127 MMHG | TEMPERATURE: 98.2 F | BODY MASS INDEX: 48.31 KG/M2 | HEIGHT: 62 IN

## 2024-11-04 DIAGNOSIS — E03.9 ACQUIRED HYPOTHYROIDISM: ICD-10-CM

## 2024-11-04 DIAGNOSIS — M25.562 BILATERAL CHRONIC KNEE PAIN: ICD-10-CM

## 2024-11-04 DIAGNOSIS — E66.01 MORBID OBESITY: Primary | ICD-10-CM

## 2024-11-04 DIAGNOSIS — K21.9 GASTROESOPHAGEAL REFLUX DISEASE WITHOUT ESOPHAGITIS: ICD-10-CM

## 2024-11-04 DIAGNOSIS — M25.561 BILATERAL CHRONIC KNEE PAIN: ICD-10-CM

## 2024-11-04 DIAGNOSIS — G47.33 OSA (OBSTRUCTIVE SLEEP APNEA): ICD-10-CM

## 2024-11-04 DIAGNOSIS — G89.29 BILATERAL CHRONIC KNEE PAIN: ICD-10-CM

## 2024-11-04 DIAGNOSIS — M15.0 PRIMARY OSTEOARTHRITIS INVOLVING MULTIPLE JOINTS: ICD-10-CM

## 2024-11-04 PROCEDURE — 1123F ACP DISCUSS/DSCN MKR DOCD: CPT | Performed by: INTERNAL MEDICINE

## 2024-11-04 PROCEDURE — 99214 OFFICE O/P EST MOD 30 MIN: CPT | Performed by: INTERNAL MEDICINE

## 2024-11-04 RX ORDER — PANTOPRAZOLE SODIUM 40 MG/1
40 TABLET, DELAYED RELEASE ORAL
Qty: 90 TABLET | Refills: 1 | Status: SHIPPED | OUTPATIENT
Start: 2024-11-04

## 2024-11-04 RX ORDER — VENLAFAXINE HCL 150 MG
150 CAPSULE, EXT RELEASE 24 HR ORAL DAILY
COMMUNITY
Start: 2024-11-03

## 2024-11-04 RX ORDER — LEVOTHYROXINE SODIUM 25 UG/1
25 TABLET ORAL NIGHTLY
Qty: 90 TABLET | Refills: 3 | Status: SHIPPED | OUTPATIENT
Start: 2024-11-04

## 2024-11-04 NOTE — PROGRESS NOTES
SPORTS MEDICINE AND PRIMARY CARE  Valdez Perez MD, FACP, CMD  [address] [facility] [address]  Phone:  [phone]  Fax: [phone]       Chief Complaint   Patient presents with    Follow-up    Thyroid Problem   .      SUBJECTIVE:       Ngozi Lainez is a 64 y.o. female Patient returns today with a known history of bipolar disorder, morbid obesity, hip pain, obstructive sleep apnea, status post right total knee arthroplasty, bilateral sciatica, GERD, hypothyroidism, dyslipidemia, DDD lumbar spine, fatty liver, and is seen for evaluation.    Patient complains of some acid reflux. The stress of the preparation for the colonoscopy was a little bit too much for her so she is going to reschedule.  Patient is seen for evaluation.             Current Outpatient Medications   Medication Sig Dispense Refill    EFFEXOR  MG extended release capsule Take 1 capsule by mouth daily      levothyroxine (SYNTHROID) 25 MCG tablet Take 1 tablet by mouth nightly 90 tablet 3    pantoprazole (PROTONIX) 40 MG tablet Take 1 tablet by mouth every morning (before breakfast) 90 tablet 1    lamoTRIgine (LAMICTAL) 200 MG tablet Take 1 tablet by mouth daily      Misc. Devices (CPAP MACHINE) MISC by Other route      TROSPIUM CHLORIDE PO Take 20 mg by mouth in the morning and at bedtime prn      traZODone (DESYREL) 50 MG tablet Take 1 tablet by mouth nightly as needed for Sleep      venlafaxine (EFFEXOR XR) 37.5 MG extended release capsule Take 1 capsule by mouth daily      CAPLYTA 42 MG capsule        No current facility-administered medications for this visit.     Past Medical History:   Diagnosis Date    ADHD (attention deficit hyperactivity disorder) 1965    Anxiety and depression     [facility]    Chronic pain     LEFT KNEE    DDD (degenerative disc disease), lumbosacral     L5-S1 ([facility])    Depression     Fatty liver 2018    Foot fracture, right     h/o    Genital herpes     GERD

## 2024-11-04 NOTE — PROGRESS NOTES
Chief Complaint   Patient presents with    Follow-up    Thyroid Problem     \"Have you been to the ER, urgent care clinic since your last visit?  Hospitalized since your last visit?\"    YES - When: approximately 2 months ago.  Where and Why: Urgent Care in [place].    “Have you seen or consulted any other health care providers outside our system since your last visit?”    NO     “Have you had a pap smear?”    NO    No cervical cancer screening on file       “Have you had a colorectal cancer screening such as a colonoscopy/FIT/Cologuard?    NO    Date of last Colonoscopy: 9/20/2012  No cologuard on file  No FIT/FOBT on file   No flexible sigmoidoscopy on file

## 2024-11-20 NOTE — PROGRESS NOTES
Transition of Care Plan  RUR N/A    Patient is fully COVID 19 vaccinated  (March 2021)  May need COVID Rapid test fo SNF    Observation notice provided in writing to patient and/or caregiver as well as verbal explanation of the policy. Patients who are in outpatient status also receive the Observation notice. Patient has received notice and or patient representative has received via secure email, fax, or certified mail based on patient representative's preference. Medicare and State letters signed and placed in chart    Disposition   SNF  Referral sent vis CC link to [facility] [phone]  No authorization needed as patient has [facility]  Jaú liaison [phone]    Transportation   [facility] [phone]    Medical follow up  PCP and specialist    Contact   Sister  Tristen Gamez  [phone]    CM met with patient in her room to introduce self and explain role. Patient was alert and oriented. Had left knee replacement yesterday. She explained to CM that she lives alone in senior housing. . 6 steps to enter. Self care prior to admission and uses [address] [place] [address] for transportation . She has RW raised toilet seat and shower chair  for DME     Patient has no one to assist her at home when discharged-- would like to go to SNF. Transition of Care Plan: SNF    The Patient was provided with a choice of provider and agrees  with the discharge plan. Yes [x] No []  A Freedom of choice list was provided with basic dialogue that supports the patient's individualized plan of care/goals and shares the quality data associated with the providers. Yes [x] No []    Referral sent to [facility] via CC link. MITCHEL talked with Paul. Liaison, [phone] and she will review the medicals and call CM with decision. Mitchel explained that patient will be ready for discharge tomorrow    CM will follow and assist with completion of transition of care plan.       Care Management Interventions  PCP Verified by CM: Yes  Mode of Transport at Discharge:  (car)  Transition of Care Consult (CM Consult):  Other  Discharge Durable Medical Equipment: No  Physical Therapy Consult: Yes  Occupational Therapy Consult: Yes  Support Systems: Other Family Member(s) (lives in apartment alone   self care and independent prior to admission  Has AMD)  Confirm Follow Up Transport: Other (see comment) (friend or care Heather Sue)  The Plan for Transition of Care is Related to the Following Treatment Goals : snf  The Patient and/or Patient Representative was Provided with a Choice of Provider and Agrees with the Discharge Plan?: Yes  Freedom of Choice List was Provided with Basic Dialogue that Supports the Patient's Individualized Plan of Care/Goals, Treatment Preferences and Shares the Quality Data Associated with the Providers?: Yes  Discharge Location  Discharge Placement: Skilled nursing facility  used

## 2025-02-06 ENCOUNTER — OFFICE VISIT (OUTPATIENT)
Facility: CLINIC | Age: 65
End: 2025-02-06

## 2025-02-06 VITALS
BODY MASS INDEX: 49.74 KG/M2 | HEART RATE: 96 BPM | OXYGEN SATURATION: 93 % | HEIGHT: 62 IN | WEIGHT: 270.3 LBS | TEMPERATURE: 98.2 F | DIASTOLIC BLOOD PRESSURE: 96 MMHG | SYSTOLIC BLOOD PRESSURE: 153 MMHG | RESPIRATION RATE: 18 BRPM

## 2025-02-06 DIAGNOSIS — F31.4 BIPOLAR I DISORDER, MOST RECENT EPISODE DEPRESSED, SEVERE WITHOUT PSYCHOTIC FEATURES (HCC): ICD-10-CM

## 2025-02-06 DIAGNOSIS — U09.9 COVID-19 LONG HAULER MANIFESTING CHRONIC COUGH: Primary | ICD-10-CM

## 2025-02-06 DIAGNOSIS — U07.1 COVID-19 VIRUS INFECTION: ICD-10-CM

## 2025-02-06 DIAGNOSIS — E66.01 MORBID OBESITY: ICD-10-CM

## 2025-02-06 DIAGNOSIS — R06.02 SOB (SHORTNESS OF BREATH): ICD-10-CM

## 2025-02-06 DIAGNOSIS — R21 RASH OF FACE: ICD-10-CM

## 2025-02-06 DIAGNOSIS — E78.5 DYSLIPIDEMIA: ICD-10-CM

## 2025-02-06 DIAGNOSIS — R05.3 COVID-19 LONG HAULER MANIFESTING CHRONIC COUGH: Primary | ICD-10-CM

## 2025-02-06 LAB
ALBUMIN SERPL-MCNC: 3.7 G/DL (ref 3.5–5)
ALBUMIN/GLOB SERPL: 0.9 (ref 1.1–2.2)
ALP SERPL-CCNC: 127 U/L (ref 45–117)
ALT SERPL-CCNC: 59 U/L (ref 12–78)
ANION GAP SERPL CALC-SCNC: 8 MMOL/L (ref 2–12)
APPEARANCE UR: CLEAR
AST SERPL-CCNC: 25 U/L (ref 15–37)
BACTERIA URNS QL MICRO: NEGATIVE /HPF
BASOPHILS # BLD: 0.02 K/UL (ref 0–0.1)
BASOPHILS NFR BLD: 0.3 % (ref 0–1)
BILIRUB SERPL-MCNC: 0.4 MG/DL (ref 0.2–1)
BILIRUB UR QL: NEGATIVE
BUN SERPL-MCNC: 21 MG/DL (ref 6–20)
BUN/CREAT SERPL: 20 (ref 12–20)
CALCIUM SERPL-MCNC: 9.7 MG/DL (ref 8.5–10.1)
CHLORIDE SERPL-SCNC: 104 MMOL/L (ref 97–108)
CHOLEST SERPL-MCNC: 255 MG/DL
CO2 SERPL-SCNC: 25 MMOL/L (ref 21–32)
COLOR UR: NORMAL
CREAT SERPL-MCNC: 1.03 MG/DL (ref 0.55–1.02)
D DIMER PPP FEU-MCNC: 0.61 MG/L FEU (ref 0–0.65)
DIFFERENTIAL METHOD BLD: NORMAL
EOSINOPHIL # BLD: 0.17 K/UL (ref 0–0.4)
EOSINOPHIL NFR BLD: 2.6 % (ref 0–7)
EPITH CASTS URNS QL MICRO: NORMAL /LPF
ERYTHROCYTE [DISTWIDTH] IN BLOOD BY AUTOMATED COUNT: 14.3 % (ref 11.5–14.5)
GLOBULIN SER CALC-MCNC: 3.9 G/DL (ref 2–4)
GLUCOSE SERPL-MCNC: 118 MG/DL (ref 65–100)
GLUCOSE UR STRIP.AUTO-MCNC: NEGATIVE MG/DL
HCT VFR BLD AUTO: 44.7 % (ref 35–47)
HDLC SERPL-MCNC: 47 MG/DL
HDLC SERPL: 5.4 (ref 0–5)
HGB BLD-MCNC: 14.4 G/DL (ref 11.5–16)
HGB UR QL STRIP: NEGATIVE
HYALINE CASTS URNS QL MICRO: NORMAL /LPF (ref 0–5)
IMM GRANULOCYTES # BLD AUTO: 0.03 K/UL (ref 0–0.04)
IMM GRANULOCYTES NFR BLD AUTO: 0.5 % (ref 0–0.5)
KETONES UR QL STRIP.AUTO: NEGATIVE MG/DL
LDLC SERPL CALC-MCNC: 176.2 MG/DL (ref 0–100)
LEUKOCYTE ESTERASE UR QL STRIP.AUTO: NEGATIVE
LYMPHOCYTES # BLD: 1.71 K/UL (ref 0.8–3.5)
LYMPHOCYTES NFR BLD: 26.1 % (ref 12–49)
MCH RBC QN AUTO: 29 PG (ref 26–34)
MCHC RBC AUTO-ENTMCNC: 32.2 G/DL (ref 30–36.5)
MCV RBC AUTO: 89.9 FL (ref 80–99)
MONOCYTES # BLD: 0.54 K/UL (ref 0–1)
MONOCYTES NFR BLD: 8.2 % (ref 5–13)
NEUTS SEG # BLD: 4.09 K/UL (ref 1.8–8)
NEUTS SEG NFR BLD: 62.3 % (ref 32–75)
NITRITE UR QL STRIP.AUTO: NEGATIVE
NRBC # BLD: 0 K/UL (ref 0–0.01)
NRBC BLD-RTO: 0 PER 100 WBC
PH UR STRIP: 5.5 (ref 5–8)
PLATELET # BLD AUTO: 212 K/UL (ref 150–400)
PMV BLD AUTO: 10.6 FL (ref 8.9–12.9)
POTASSIUM SERPL-SCNC: 4.4 MMOL/L (ref 3.5–5.1)
PROT SERPL-MCNC: 7.6 G/DL (ref 6.4–8.2)
PROT UR STRIP-MCNC: NEGATIVE MG/DL
RBC # BLD AUTO: 4.97 M/UL (ref 3.8–5.2)
RBC #/AREA URNS HPF: NORMAL /HPF (ref 0–5)
SODIUM SERPL-SCNC: 137 MMOL/L (ref 136–145)
SP GR UR REFRACTOMETRY: 1.01 (ref 1–1.03)
TRIGL SERPL-MCNC: 159 MG/DL
TSH SERPL DL<=0.05 MIU/L-ACNC: 3.87 UIU/ML (ref 0.36–3.74)
UROBILINOGEN UR QL STRIP.AUTO: 0.2 EU/DL (ref 0.2–1)
VLDLC SERPL CALC-MCNC: 31.8 MG/DL
WBC # BLD AUTO: 6.6 K/UL (ref 3.6–11)
WBC URNS QL MICRO: NORMAL /HPF (ref 0–4)

## 2025-02-06 RX ORDER — ATORVASTATIN CALCIUM 10 MG/1
10 TABLET, FILM COATED ORAL DAILY
Qty: 90 TABLET | Refills: 1 | Status: SHIPPED | OUTPATIENT
Start: 2025-02-06

## 2025-02-06 SDOH — ECONOMIC STABILITY: FOOD INSECURITY: WITHIN THE PAST 12 MONTHS, YOU WORRIED THAT YOUR FOOD WOULD RUN OUT BEFORE YOU GOT MONEY TO BUY MORE.: NEVER TRUE

## 2025-02-06 SDOH — ECONOMIC STABILITY: FOOD INSECURITY: WITHIN THE PAST 12 MONTHS, THE FOOD YOU BOUGHT JUST DIDN'T LAST AND YOU DIDN'T HAVE MONEY TO GET MORE.: NEVER TRUE

## 2025-02-06 ASSESSMENT — PATIENT HEALTH QUESTIONNAIRE - PHQ9
SUM OF ALL RESPONSES TO PHQ QUESTIONS 1-9: 0
10. IF YOU CHECKED OFF ANY PROBLEMS, HOW DIFFICULT HAVE THESE PROBLEMS MADE IT FOR YOU TO DO YOUR WORK, TAKE CARE OF THINGS AT HOME, OR GET ALONG WITH OTHER PEOPLE: NOT DIFFICULT AT ALL
8. MOVING OR SPEAKING SO SLOWLY THAT OTHER PEOPLE COULD HAVE NOTICED. OR THE OPPOSITE, BEING SO FIGETY OR RESTLESS THAT YOU HAVE BEEN MOVING AROUND A LOT MORE THAN USUAL: NOT AT ALL
3. TROUBLE FALLING OR STAYING ASLEEP: NOT AT ALL
9. THOUGHTS THAT YOU WOULD BE BETTER OFF DEAD, OR OF HURTING YOURSELF: NOT AT ALL
SUM OF ALL RESPONSES TO PHQ QUESTIONS 1-9: 0
7. TROUBLE CONCENTRATING ON THINGS, SUCH AS READING THE NEWSPAPER OR WATCHING TELEVISION: NOT AT ALL
4. FEELING TIRED OR HAVING LITTLE ENERGY: NOT AT ALL
1. LITTLE INTEREST OR PLEASURE IN DOING THINGS: NOT AT ALL
SUM OF ALL RESPONSES TO PHQ9 QUESTIONS 1 & 2: 0
SUM OF ALL RESPONSES TO PHQ QUESTIONS 1-9: 0
SUM OF ALL RESPONSES TO PHQ QUESTIONS 1-9: 0
5. POOR APPETITE OR OVEREATING: NOT AT ALL
6. FEELING BAD ABOUT YOURSELF - OR THAT YOU ARE A FAILURE OR HAVE LET YOURSELF OR YOUR FAMILY DOWN: NOT AT ALL
2. FEELING DOWN, DEPRESSED OR HOPELESS: NOT AT ALL

## 2025-02-06 ASSESSMENT — ANXIETY QUESTIONNAIRES
1. FEELING NERVOUS, ANXIOUS, OR ON EDGE: SEVERAL DAYS
5. BEING SO RESTLESS THAT IT IS HARD TO SIT STILL: SEVERAL DAYS
IF YOU CHECKED OFF ANY PROBLEMS ON THIS QUESTIONNAIRE, HOW DIFFICULT HAVE THESE PROBLEMS MADE IT FOR YOU TO DO YOUR WORK, TAKE CARE OF THINGS AT HOME, OR GET ALONG WITH OTHER PEOPLE: SOMEWHAT DIFFICULT
7. FEELING AFRAID AS IF SOMETHING AWFUL MIGHT HAPPEN: SEVERAL DAYS
6. BECOMING EASILY ANNOYED OR IRRITABLE: SEVERAL DAYS
4. TROUBLE RELAXING: SEVERAL DAYS
2. NOT BEING ABLE TO STOP OR CONTROL WORRYING: SEVERAL DAYS
3. WORRYING TOO MUCH ABOUT DIFFERENT THINGS: SEVERAL DAYS
GAD7 TOTAL SCORE: 7

## 2025-02-06 NOTE — PROGRESS NOTES
Chief Complaint   Patient presents with    Cough     \"Have you been to the ER, urgent care clinic since your last visit?  Hospitalized since your last visit?\"    YES - When: approximately 19 days ago.  Where and Why: [facility] urgent care for Covid.    “Have you seen or consulted any other health care providers outside our system since your last visit?”    NO     “Have you had a pap smear?”    NO    No cervical cancer screening on file       “Have you had a colorectal cancer screening such as a colonoscopy/FIT/Cologuard?    NO    Date of last Colonoscopy: 9/20/2012  No cologuard on file  No FIT/FOBT on file   No flexible sigmoidoscopy on file

## 2025-02-06 NOTE — PROGRESS NOTES
SPORTS MEDICINE AND PRIMARY CARE  Valdez Perez MD, FACP, CMD  [address] [facility] [address]  Phone:  [phone]  Fax: [phone]       Chief Complaint   Patient presents with    Cough   .      SUBJECTIVE:  History of Present Illness         Ngozi Lainez is a 64 y.o. female     patient presents for evaluation of persistent cough, pruritic facial rash, hypertension, weight management, and bipolar disorder.    She was diagnosed with COVID-19 on 01/04/2024, following a severe headache the previous night. Despite self-isolation and masking, she did not test herself until the subsequent Tuesday, when she received a positive result. A repeat test after a 10-day period was negative. On 01/18/2024, she tested negative for both COVID-19 and influenza at an urgent care facility. Since her COVID-19 infection, she has been experiencing a persistent cough, which is productive approximately half of the time. She also reports occasional wheezing. She continues to use a CPAP machine and requires new supplies. She has not undergone a chest x-ray in several years.    She reports a recent onset of an itchy rash on her face and scalp, which she describes as different from typical scalp itchiness. She also experiences ocular symptoms, including itching and watering. She has not introduced any new soaps or creams to her skincare routine. She suspects the rash may be shingles, although she notes the presence of spots rather than blisters. She has a history of chickenpox.    She does not regularly monitor her blood pressure at home. She recalls a previous visit to the crisis unit during the summer when her blood pressure was significantly elevated, but it had normalized by the time of her follow-up appointment. She is considering whether she should begin home blood pressure monitoring.    She is contemplating the use of weight loss injections but expresses concern about potential side effects, including intestinal

## 2025-05-07 ENCOUNTER — OFFICE VISIT (OUTPATIENT)
Facility: CLINIC | Age: 65
End: 2025-05-07
Payer: COMMERCIAL

## 2025-05-07 VITALS
TEMPERATURE: 99.1 F | RESPIRATION RATE: 18 BRPM | BODY MASS INDEX: 50.42 KG/M2 | HEIGHT: 62 IN | WEIGHT: 274 LBS | DIASTOLIC BLOOD PRESSURE: 79 MMHG | HEART RATE: 97 BPM | OXYGEN SATURATION: 93 % | SYSTOLIC BLOOD PRESSURE: 120 MMHG

## 2025-05-07 DIAGNOSIS — E03.9 ACQUIRED HYPOTHYROIDISM: ICD-10-CM

## 2025-05-07 DIAGNOSIS — Z01.419 ENCOUNTER FOR WELL WOMAN EXAM: ICD-10-CM

## 2025-05-07 DIAGNOSIS — E78.00 PURE HYPERCHOLESTEROLEMIA: ICD-10-CM

## 2025-05-07 DIAGNOSIS — R05.3 CHRONIC COUGH: ICD-10-CM

## 2025-05-07 DIAGNOSIS — F31.4 BIPOLAR I DISORDER, MOST RECENT EPISODE DEPRESSED, SEVERE WITHOUT PSYCHOTIC FEATURES (HCC): ICD-10-CM

## 2025-05-07 DIAGNOSIS — M17.11 ARTHRITIS OF RIGHT KNEE: ICD-10-CM

## 2025-05-07 DIAGNOSIS — M15.0 PRIMARY OSTEOARTHRITIS INVOLVING MULTIPLE JOINTS: ICD-10-CM

## 2025-05-07 DIAGNOSIS — E66.01 MORBID OBESITY (HCC): Primary | ICD-10-CM

## 2025-05-07 PROCEDURE — 99214 OFFICE O/P EST MOD 30 MIN: CPT | Performed by: INTERNAL MEDICINE

## 2025-05-07 PROCEDURE — 1123F ACP DISCUSS/DSCN MKR DOCD: CPT | Performed by: INTERNAL MEDICINE

## 2025-05-07 RX ORDER — LEVOTHYROXINE SODIUM 25 UG/1
25 TABLET ORAL NIGHTLY
Qty: 90 TABLET | Refills: 3 | Status: SHIPPED | OUTPATIENT
Start: 2025-05-07

## 2025-05-07 SDOH — ECONOMIC STABILITY: FOOD INSECURITY: WITHIN THE PAST 12 MONTHS, YOU WORRIED THAT YOUR FOOD WOULD RUN OUT BEFORE YOU GOT MONEY TO BUY MORE.: NEVER TRUE

## 2025-05-07 SDOH — ECONOMIC STABILITY: FOOD INSECURITY: WITHIN THE PAST 12 MONTHS, THE FOOD YOU BOUGHT JUST DIDN'T LAST AND YOU DIDN'T HAVE MONEY TO GET MORE.: NEVER TRUE

## 2025-05-07 ASSESSMENT — ANXIETY QUESTIONNAIRES
1. FEELING NERVOUS, ANXIOUS, OR ON EDGE: SEVERAL DAYS
5. BEING SO RESTLESS THAT IT IS HARD TO SIT STILL: SEVERAL DAYS
2. NOT BEING ABLE TO STOP OR CONTROL WORRYING: SEVERAL DAYS
IF YOU CHECKED OFF ANY PROBLEMS ON THIS QUESTIONNAIRE, HOW DIFFICULT HAVE THESE PROBLEMS MADE IT FOR YOU TO DO YOUR WORK, TAKE CARE OF THINGS AT HOME, OR GET ALONG WITH OTHER PEOPLE: SOMEWHAT DIFFICULT
7. FEELING AFRAID AS IF SOMETHING AWFUL MIGHT HAPPEN: SEVERAL DAYS
3. WORRYING TOO MUCH ABOUT DIFFERENT THINGS: SEVERAL DAYS
4. TROUBLE RELAXING: SEVERAL DAYS
GAD7 TOTAL SCORE: 7
6. BECOMING EASILY ANNOYED OR IRRITABLE: SEVERAL DAYS

## 2025-05-07 ASSESSMENT — PATIENT HEALTH QUESTIONNAIRE - PHQ9
7. TROUBLE CONCENTRATING ON THINGS, SUCH AS READING THE NEWSPAPER OR WATCHING TELEVISION: NOT AT ALL
3. TROUBLE FALLING OR STAYING ASLEEP: NOT AT ALL
4. FEELING TIRED OR HAVING LITTLE ENERGY: NOT AT ALL
8. MOVING OR SPEAKING SO SLOWLY THAT OTHER PEOPLE COULD HAVE NOTICED. OR THE OPPOSITE, BEING SO FIGETY OR RESTLESS THAT YOU HAVE BEEN MOVING AROUND A LOT MORE THAN USUAL: NOT AT ALL
SUM OF ALL RESPONSES TO PHQ QUESTIONS 1-9: 0
10. IF YOU CHECKED OFF ANY PROBLEMS, HOW DIFFICULT HAVE THESE PROBLEMS MADE IT FOR YOU TO DO YOUR WORK, TAKE CARE OF THINGS AT HOME, OR GET ALONG WITH OTHER PEOPLE: NOT DIFFICULT AT ALL
9. THOUGHTS THAT YOU WOULD BE BETTER OFF DEAD, OR OF HURTING YOURSELF: NOT AT ALL
SUM OF ALL RESPONSES TO PHQ QUESTIONS 1-9: 0
5. POOR APPETITE OR OVEREATING: NOT AT ALL
1. LITTLE INTEREST OR PLEASURE IN DOING THINGS: NOT AT ALL
SUM OF ALL RESPONSES TO PHQ QUESTIONS 1-9: 0
SUM OF ALL RESPONSES TO PHQ QUESTIONS 1-9: 0
6. FEELING BAD ABOUT YOURSELF - OR THAT YOU ARE A FAILURE OR HAVE LET YOURSELF OR YOUR FAMILY DOWN: NOT AT ALL
2. FEELING DOWN, DEPRESSED OR HOPELESS: NOT AT ALL

## 2025-05-07 NOTE — PROGRESS NOTES
Chief Complaint   Patient presents with    Follow-up    Obesity     Have you been to the ER, urgent care clinic since your last visit?  Hospitalized since your last visit?   NO    Have you seen or consulted any other health care providers outside our system since your last visit?   NO     Have you had a pap smear?   NO    No cervical cancer screening on file       Have you had a colorectal cancer screening such as a colonoscopy/FIT/Cologuard?    NO    Date of last Colonoscopy: 9/20/2012  No cologuard on file  No FIT/FOBT on file   No flexible sigmoidoscopy on file            
Sign     Unable to Pay for Housing in the Last Year: Yes     Number of Times Moved in the Last Year: 1     Homeless in the Last Year: Yes     Family History   Problem Relation Age of Onset    Diabetes Paternal Uncle     Cancer Maternal Grandmother         ovarian    Mental Retardation Brother     Seizures Brother     Diabetes Father         onset with age    Lung Disease Father         emphysema, COPD, Asbestosis    Heart Disease Father     COPD Father     Alcohol Abuse Father     Pulmonary Embolism Mother     Heart Disease Mother     No Known Problems Sister     Other Sister         PERIPHERAL ARTERY DISEASE    Diabetes Paternal Grandfather         type 2    Colon Cancer Other         materal grandmothers siblings x 3-4  of colon cancer    Heart Disease Maternal Grandfather     Celiac Disease Brother     Anesth Problems Neg Hx        OBJECTIVE:    /79 (BP Site: Left Upper Arm, Patient Position: Sitting, BP Cuff Size: Large Adult)   Pulse 97   Temp 99.1 °F (37.3 °C) (Oral)   Resp 18   Ht 1.575 m (5' 2\")   Wt 124.3 kg (274 lb)   SpO2 93%   BMI 50.12 kg/m²   CONSTITUTIONAL: well , well nourished, appears age appropriate  EYES: perrla, eom intact  ENMT:moist mucous membranes, pharynx clear  NECK: supple. Thyroid normal  RESPIRATORY: Chest: clear bilaterally   CARDIOVASCULAR: Heart: regular rate and rhythm  GASTROINTESTINAL: Abdomen: soft, bowel sounds active  HEMATOLOGIC: no pathological lymph nodes palpated  MUSCULOSKELETAL: Extremities: no edema, pulse 1+   INTEGUMENT: No unusual rashes or suspicious skin lesions noted. Nails appear normal.  NEUROLOGIC: non-focal exam   MENTAL STATUS: alert and oriented, appropriate affect             Assessment & Plan  1. Chronic Cough: Multifactorial.  - Increase acid reflux medication and elevate the head of the bed at night.  - Perform a CT scan of the lungs to rule out other pathology.  - Consider referral to ENT specialist or allergist if GI doctor does not

## 2025-05-08 ENCOUNTER — RESULTS FOLLOW-UP (OUTPATIENT)
Facility: CLINIC | Age: 65
End: 2025-05-08

## 2025-05-08 DIAGNOSIS — N18.31 STAGE 3A CHRONIC KIDNEY DISEASE (HCC): Primary | ICD-10-CM

## 2025-05-08 LAB
ANION GAP SERPL CALC-SCNC: 4 MMOL/L (ref 2–12)
BUN SERPL-MCNC: 16 MG/DL (ref 6–20)
BUN/CREAT SERPL: 13 (ref 12–20)
CALCIUM SERPL-MCNC: 9.1 MG/DL (ref 8.5–10.1)
CHLORIDE SERPL-SCNC: 108 MMOL/L (ref 97–108)
CHOLEST SERPL-MCNC: 184 MG/DL
CO2 SERPL-SCNC: 29 MMOL/L (ref 21–32)
CREAT SERPL-MCNC: 1.24 MG/DL (ref 0.55–1.02)
GLUCOSE SERPL-MCNC: 126 MG/DL (ref 65–100)
HDLC SERPL-MCNC: 35 MG/DL
HDLC SERPL: 5.3 (ref 0–5)
LDLC SERPL CALC-MCNC: 95.4 MG/DL (ref 0–100)
POTASSIUM SERPL-SCNC: 4.1 MMOL/L (ref 3.5–5.1)
SODIUM SERPL-SCNC: 141 MMOL/L (ref 136–145)
TRIGL SERPL-MCNC: 268 MG/DL
VLDLC SERPL CALC-MCNC: 53.6 MG/DL

## 2025-05-13 ENCOUNTER — ANESTHESIA EVENT (OUTPATIENT)
Facility: HOSPITAL | Age: 65
End: 2025-05-13
Payer: MEDICARE

## 2025-05-13 ENCOUNTER — HOSPITAL ENCOUNTER (OUTPATIENT)
Facility: HOSPITAL | Age: 65
Setting detail: OUTPATIENT SURGERY
Discharge: HOME | End: 2025-05-13
Attending: SPECIALIST | Admitting: SPECIALIST
Payer: MEDICARE

## 2025-05-13 ENCOUNTER — ANESTHESIA (OUTPATIENT)
Facility: HOSPITAL | Age: 65
End: 2025-05-13
Payer: MEDICARE

## 2025-05-13 VITALS
HEIGHT: 62 IN | HEART RATE: 78 BPM | DIASTOLIC BLOOD PRESSURE: 68 MMHG | OXYGEN SATURATION: 98 % | TEMPERATURE: 97.5 F | WEIGHT: 274 LBS | RESPIRATION RATE: 15 BRPM | BODY MASS INDEX: 50.42 KG/M2 | SYSTOLIC BLOOD PRESSURE: 129 MMHG

## 2025-05-13 PROCEDURE — 7100000010 HC PHASE II RECOVERY - FIRST 15 MIN: Performed by: SPECIALIST

## 2025-05-13 PROCEDURE — 6360000002 HC RX W HCPCS: Performed by: ANESTHESIOLOGY

## 2025-05-13 PROCEDURE — 2709999900 HC NON-CHARGEABLE SUPPLY: Performed by: SPECIALIST

## 2025-05-13 PROCEDURE — 3600007512: Performed by: SPECIALIST

## 2025-05-13 PROCEDURE — 2580000003 HC RX 258: Performed by: ANESTHESIOLOGY

## 2025-05-13 PROCEDURE — 3700000000 HC ANESTHESIA ATTENDED CARE: Performed by: SPECIALIST

## 2025-05-13 PROCEDURE — 88305 TISSUE EXAM BY PATHOLOGIST: CPT

## 2025-05-13 PROCEDURE — 3600007502: Performed by: SPECIALIST

## 2025-05-13 PROCEDURE — 3700000001 HC ADD 15 MINUTES (ANESTHESIA): Performed by: SPECIALIST

## 2025-05-13 RX ORDER — SODIUM CHLORIDE 9 MG/ML
INJECTION, SOLUTION INTRAVENOUS CONTINUOUS
Status: DISCONTINUED | OUTPATIENT
Start: 2025-05-13 | End: 2025-05-13 | Stop reason: HOSPADM

## 2025-05-13 RX ORDER — SODIUM CHLORIDE 0.9 % (FLUSH) 0.9 %
5-40 SYRINGE (ML) INJECTION EVERY 12 HOURS SCHEDULED
Status: DISCONTINUED | OUTPATIENT
Start: 2025-05-13 | End: 2025-05-13 | Stop reason: HOSPADM

## 2025-05-13 RX ORDER — SODIUM CHLORIDE 9 MG/ML
INJECTION, SOLUTION INTRAVENOUS
Status: DISCONTINUED | OUTPATIENT
Start: 2025-05-13 | End: 2025-05-13 | Stop reason: SDUPTHER

## 2025-05-13 RX ORDER — SODIUM CHLORIDE 0.9 % (FLUSH) 0.9 %
5-40 SYRINGE (ML) INJECTION PRN
Status: DISCONTINUED | OUTPATIENT
Start: 2025-05-13 | End: 2025-05-13 | Stop reason: HOSPADM

## 2025-05-13 RX ORDER — SODIUM CHLORIDE 9 MG/ML
INJECTION, SOLUTION INTRAVENOUS PRN
Status: DISCONTINUED | OUTPATIENT
Start: 2025-05-13 | End: 2025-05-13 | Stop reason: HOSPADM

## 2025-05-13 RX ORDER — LIDOCAINE HYDROCHLORIDE 20 MG/ML
INJECTION, SOLUTION EPIDURAL; INFILTRATION; INTRACAUDAL; PERINEURAL
Status: DISCONTINUED | OUTPATIENT
Start: 2025-05-13 | End: 2025-05-13 | Stop reason: SDUPTHER

## 2025-05-13 RX ORDER — ACETAMINOPHEN 325 MG/1
650 TABLET ORAL EVERY 6 HOURS PRN
COMMUNITY

## 2025-05-13 RX ADMIN — PROPOFOL 50 MG: 10 INJECTION, EMULSION INTRAVENOUS at 10:59

## 2025-05-13 RX ADMIN — PROPOFOL 50 MG: 10 INJECTION, EMULSION INTRAVENOUS at 11:26

## 2025-05-13 RX ADMIN — PROPOFOL 50 MG: 10 INJECTION, EMULSION INTRAVENOUS at 11:08

## 2025-05-13 RX ADMIN — PROPOFOL 50 MG: 10 INJECTION, EMULSION INTRAVENOUS at 10:56

## 2025-05-13 RX ADMIN — PROPOFOL 50 MG: 10 INJECTION, EMULSION INTRAVENOUS at 10:53

## 2025-05-13 RX ADMIN — LIDOCAINE HYDROCHLORIDE 80 MG: 20 INJECTION, SOLUTION EPIDURAL; INFILTRATION; INTRACAUDAL; PERINEURAL at 10:52

## 2025-05-13 RX ADMIN — PROPOFOL 50 MG: 10 INJECTION, EMULSION INTRAVENOUS at 11:03

## 2025-05-13 RX ADMIN — SODIUM CHLORIDE: 9 INJECTION, SOLUTION INTRAVENOUS at 10:51

## 2025-05-13 RX ADMIN — PROPOFOL 50 MG: 10 INJECTION, EMULSION INTRAVENOUS at 11:31

## 2025-05-13 RX ADMIN — PROPOFOL 50 MG: 10 INJECTION, EMULSION INTRAVENOUS at 11:21

## 2025-05-13 RX ADMIN — PROPOFOL 50 MG: 10 INJECTION, EMULSION INTRAVENOUS at 11:17

## 2025-05-13 RX ADMIN — PROPOFOL 50 MG: 10 INJECTION, EMULSION INTRAVENOUS at 11:12

## 2025-05-13 RX ADMIN — PROPOFOL 50 MG: 10 INJECTION, EMULSION INTRAVENOUS at 10:52

## 2025-05-13 ASSESSMENT — PAIN - FUNCTIONAL ASSESSMENT
PAIN_FUNCTIONAL_ASSESSMENT: NONE - DENIES PAIN

## 2025-05-13 NOTE — ANESTHESIA PRE PROCEDURE
Department of Anesthesiology  Preprocedure Note       Name:  Ngozi Lainez   Age:  64 y.o.  :  1960                                          MRN:  [id number]         Date:  2025      Surgeon: Surgeon(s):  Truman Marquez MD    Procedure: Procedure(s):  ESOPHAGOGASTRODUODENOSCOPY  COLONOSCOPY DIAGNOSTIC    Medications prior to admission:   Prior to Admission medications    Medication Sig Start Date End Date Taking? Authorizing Provider   acetaminophen (TYLENOL) 325 MG tablet Take 2 tablets by mouth every 6 hours as needed for Pain   Yes Jenni Hoffmann MD   tirzepatide-weight management (ZEPBOUND) 2.5 MG/0.5ML SOLN subCUTAneous injection (VIAL) Inject 0.5 mLs into the skin once a week 25  Yes Valdez Perez MD   pantoprazole (PROTONIX) 40 MG tablet Take 1 tablet by mouth every morning (before breakfast) 24  Yes Valdez Perez MD   TROSPIUM CHLORIDE PO Take 20 mg by mouth in the morning and at bedtime prn 23  Yes Jenni Hoffmann MD   traZODone (DESYREL) 50 MG tablet Take 1 tablet by mouth nightly as needed for Sleep   Yes Jenni Hoffmann MD   levothyroxine (SYNTHROID) 25 MCG tablet Take 1 tablet by mouth nightly 25   Valdez Perez MD   atorvastatin (LIPITOR) 10 MG tablet Take 1 tablet by mouth daily 25   Valdez Perez MD   EFFEXOR  MG extended release capsule Take 1 capsule by mouth daily 11/3/24   Jenni Hoffmann MD   lamoTRIgine (LAMICTAL) 200 MG tablet Take 1 tablet by mouth daily 24   Jenni Hoffmann MD   Misc. Devices (CPAP MACHINE) MISC by Other route 7/13/15   Jenni Hoffmann MD       Current medications:    Current Facility-Administered Medications   Medication Dose Route Frequency Provider Last Rate Last Admin   • 0.9 % sodium chloride infusion   IntraVENous Continuous Truman Marquez MD       • sodium chloride flush 0.9 % injection 5-40 mL  5-40 mL IntraVENous 2 times per day Jimmy 
10:52 AM    HGB 14.4 02/06/2025 10:52 AM    HCT 44.7 02/06/2025 10:52 AM    MCV 89.9 02/06/2025 10:52 AM    RDW 14.3 02/06/2025 10:52 AM     02/06/2025 10:52 AM       CMP:   Lab Results   Component Value Date/Time     05/07/2025 03:28 AM    K 4.1 05/07/2025 03:28 AM     05/07/2025 03:28 AM    CO2 29 05/07/2025 03:28 AM    BUN 16 05/07/2025 03:28 AM    CREATININE 1.24 05/07/2025 03:28 AM    GFRAA >60 03/16/2022 10:41 AM    AGRATIO 1.2 03/16/2022 10:41 AM    LABGLOM 48 05/07/2025 03:28 AM    LABGLOM >60 02/26/2024 04:08 PM    GLUCOSE 126 05/07/2025 03:28 AM    CALCIUM 9.1 05/07/2025 03:28 AM    BILITOT 0.4 02/06/2025 10:52 AM    ALKPHOS 127 02/06/2025 10:52 AM    ALKPHOS 129 03/16/2022 10:41 AM    AST 25 02/06/2025 10:52 AM    ALT 59 02/06/2025 10:52 AM       POC Tests: No results for input(s): \"POCGLU\", \"POCNA\", \"POCK\", \"POCCL\", \"POCBUN\", \"POCHEMO\", \"POCHCT\" in the last 72 hours.    Coags:   Lab Results   Component Value Date/Time    PROTIME 10.4 08/22/2023 01:44 PM    INR 1.0 08/22/2023 01:44 PM       HCG (If Applicable): No results found for: \"PREGTESTUR\", \"PREGSERUM\", \"HCG\", \"HCGQUANT\"     ABGs: No results found for: \"PHART\", \"PO2ART\", \"QCL4ORN\", \"USJ7BEN\", \"BEART\", \"B4NSEWRX\"     Type & Screen (If Applicable):  Lab Results   Component Value Date    ABORH AB POSITIVE 08/22/2023    LABANTI NEG 08/22/2023       Drug/Infectious Status (If Applicable):  Lab Results   Component Value Date/Time    HEPCAB <0.1 09/25/2015 08:27 AM       COVID-19 Screening (If Applicable):   Lab Results   Component Value Date/Time    COVID19 Not detected 10/28/2021 10:04 AM    COVID19 Not detected 10/21/2021 11:13 AM           Anesthesia Evaluation    Airway: Mallampati: III          Dental:          Pulmonary: breath sounds clear to auscultation  (+)     sleep apnea:                                  Cardiovascular:            Rhythm: regular  Rate: normal                    Neuro/Psych:   (+) neuromuscular disease:,

## 2025-05-13 NOTE — OP NOTE
[facility]  [address] [place] [address]  [place] [address] [phone]                           Colonoscopy and EGD Procedure Note      Indications:   GERD, family history of colon cancer , hematochezia, history of polyps    :  Truman Marquez MD    Staff: Circulator: Raymundo Proctor RN  Endoscopy Technician: Piter Andrews    Referring Provider: Valdez Perez MD    Sedation:  MAC anesthesia Propofol    Procedure Details:  After informed consent was obtained with all risks and benefits of procedure explained and pre-operative exam completed, pt was placed in the left lateral decubitus position. Following sequential administration of sedation as per above, the gastroscope was inserted into the mouth and advanced under direct vision to second portion of the duodenum.  A careful inspection was made as the gastroscope was withdrawn, including a retroflexed view of the proximal stomach; findings and interventions are described below.      EGD Findings:  Esophagus:Small hiatal hernia with diaphragmatic compression at 38 cm and irregular Z line at 36 cm. Biopsies obtained from GE junction to rule out Jack esophagus.  Stomach:Patchy antral erythema, biopsies done  Duodenum/jejunum:normal    EGD Interventions:  none    The bed was then turned and upon sequential sedation as per above, a digital rectal exam was performed per below. The Olympus videocolonoscope was inserted in the rectum and carefully advanced to the cecum, which was identified by the ileocecal valve and appendiceal orifice.  The quality of preparation was good. .The colonoscope was slowly withdrawn with careful evaluation between folds. Retroflexion in the rectum was performed.     Colon Findings:   Rectum: normal  Sigmoid: moderate diverticulosis; 2 cm pedunculated polyp removed with hot snare. Polyp was 25 cm from anus.  Descending Colon: moderate diverticulosis;5,8 mm sessile polyps removed with hot snare  Transverse

## 2025-05-13 NOTE — DISCHARGE INSTRUCTIONS
Ngozi SON Lainez  [id number]  1960    COLON/EGD DISCHARGE INSTRUCTIONS  Discomfort:  Redness at IV site- apply warm compress to area; if redness or soreness persist- contact your physician  There may be a slight amount of blood passed from the rectum  Gaseous discomfort- walking, belching will help relieve any discomfort  Sore throat- throat lozenges or warm salt water gargle  You may not operate a vehicle for 12 hours  You may not engage in an occupation involving machinery or appliances for rest of today  You may not drink alcoholic beverages for at least 12 hours  Avoid making any critical decisions for at least 24 hour  DIET:   High fiber diet.   - however -  remember your colon is empty and a heavy meal will produce gas.   Avoid these foods:  vegetables, fried / greasy foods, carbonated drinks for today.    MEDICATIONS:      Regarding Aspirin or Nonsteroidal medications, please see below.    ACTIVITY:  You may resume your normal daily activities it is recommended that you spend the remainder of the day resting -  avoid any strenuous activity.    CALL M.D.  ANY SIGN OF:  Increasing pain, nausea, vomiting  Abdominal distension (swelling)  New increased bleeding (oral or rectal)  Fever (chills)  Pain in chest area  Bloody discharge from nose or mouth  Shortness of breath    You may not  take any Advil, Aspirin, Ibuprofen, Motrin, Aleve, or Goody’s for 10 days, ONLY  Tylenol as needed for pain.    Post procedure diagnosis: colon polyps, rule out gastritis  Post-procedure recommendations: Await biopsy results    Follow-up Instructions:   Call Dr. Marquez  Results of procedure / biopsy in 10 days  Telephone #  [phone]

## 2025-05-13 NOTE — H&P
Pre-endoscopy H and P for Colonoscopy    The patient was seen and examined.Date of last colonoscopy: , Polyps  Yes      The airway was assessed and documented.  The problem list, past medical history, and medications were reviewed.     Patient Active Problem List   Diagnosis    Arthritis of right knee    Bilateral sciatica    GERD (gastroesophageal reflux disease)    Pain and swelling of right shoulder    Hypothyroidism    Localized osteoarthritis of both knees    Pure hypercholesterolemia    Bilateral knee effusions    DDD (degenerative disc disease), lumbosacral    Bipolar I disorder, most recent episode depressed, severe without psychotic features (HCC)    Symptomatic cholelithiasis    Morbid obesity (HCC)    Vitamin D deficiency    Bilateral chronic knee pain    OA (osteoarthritis)    Encounter to establish care    COVID-19 virus infection    COVID-19 long hauler manifesting chronic cough    Rash of face     Social History     Socioeconomic History    Marital status: Single     Spouse name: Not on file    Number of children: Not on file    Years of education: Not on file    Highest education level: Not on file   Occupational History    Not on file   Tobacco Use    Smoking status: Never     Passive exposure: Never    Smokeless tobacco: Never   Vaping Use    Vaping status: Never Used   Substance and Sexual Activity    Alcohol use: No    Drug use: No    Sexual activity: Not Currently     Partners: Male     Birth control/protection: Abstinence   Other Topics Concern    Not on file   Social History Narrative    Habits:  She is a lifetime nonsmoker, non drinker, non drug abuser.         Social History:  The patient is single. She has no children.  Patient lives alone.  She completed her bachelor's degree and some graduate work. 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  Christian preference is [employer].         Family History:  Father  82, COPD and

## 2025-05-17 ENCOUNTER — HOSPITAL ENCOUNTER (EMERGENCY)
Facility: HOSPITAL | Age: 65
Discharge: HOME | End: 2025-05-17
Attending: STUDENT IN AN ORGANIZED HEALTH CARE EDUCATION/TRAINING PROGRAM
Payer: MEDICARE

## 2025-05-17 VITALS
OXYGEN SATURATION: 92 % | HEART RATE: 90 BPM | TEMPERATURE: 99.4 F | RESPIRATION RATE: 18 BRPM | SYSTOLIC BLOOD PRESSURE: 157 MMHG | DIASTOLIC BLOOD PRESSURE: 77 MMHG | WEIGHT: 274.47 LBS | BODY MASS INDEX: 50.2 KG/M2

## 2025-05-17 DIAGNOSIS — N30.01 ACUTE CYSTITIS WITH HEMATURIA: Primary | ICD-10-CM

## 2025-05-17 LAB
ALBUMIN SERPL-MCNC: 3.4 G/DL (ref 3.5–5)
ALBUMIN/GLOB SERPL: 0.9 (ref 1.1–2.2)
ALP SERPL-CCNC: 130 U/L (ref 45–117)
ALT SERPL-CCNC: 91 U/L (ref 12–78)
ANION GAP SERPL CALC-SCNC: 12 MMOL/L (ref 2–12)
APPEARANCE UR: ABNORMAL
AST SERPL-CCNC: 34 U/L (ref 15–37)
BACTERIA URNS QL MICRO: NEGATIVE /HPF
BASOPHILS # BLD: 0.03 K/UL (ref 0–0.1)
BASOPHILS NFR BLD: 0.3 % (ref 0–1)
BILIRUB SERPL-MCNC: 0.6 MG/DL (ref 0.2–1)
BILIRUB UR QL CFM: ABNORMAL
BILIRUB UR QL: NEGATIVE
BUN SERPL-MCNC: 22 MG/DL (ref 6–20)
BUN/CREAT SERPL: 19 (ref 12–20)
CALCIUM SERPL-MCNC: 8.9 MG/DL (ref 8.5–10.1)
CHLORIDE SERPL-SCNC: 103 MMOL/L (ref 97–108)
CO2 SERPL-SCNC: 27 MMOL/L (ref 21–32)
COLOR UR: ABNORMAL
COMMENT:: NORMAL
CREAT SERPL-MCNC: 1.15 MG/DL (ref 0.55–1.02)
DIFFERENTIAL METHOD BLD: ABNORMAL
EOSINOPHIL # BLD: 0.15 K/UL (ref 0–0.4)
EOSINOPHIL NFR BLD: 1.3 % (ref 0–7)
EPITH CASTS URNS QL MICRO: ABNORMAL /LPF
ERYTHROCYTE [DISTWIDTH] IN BLOOD BY AUTOMATED COUNT: 13.6 % (ref 11.5–14.5)
GLOBULIN SER CALC-MCNC: 3.8 G/DL (ref 2–4)
GLUCOSE SERPL-MCNC: 127 MG/DL (ref 65–100)
GLUCOSE UR STRIP.AUTO-MCNC: 100 MG/DL
HCT VFR BLD AUTO: 43.4 % (ref 35–47)
HGB BLD-MCNC: 14.4 G/DL (ref 11.5–16)
HGB UR QL STRIP: ABNORMAL
IMM GRANULOCYTES # BLD AUTO: 0.03 K/UL (ref 0–0.04)
IMM GRANULOCYTES NFR BLD AUTO: 0.3 % (ref 0–0.5)
KETONES UR QL STRIP.AUTO: NEGATIVE MG/DL
LEUKOCYTE ESTERASE UR QL STRIP.AUTO: ABNORMAL
LIPASE SERPL-CCNC: 45 U/L (ref 13–75)
LYMPHOCYTES # BLD: 2.31 K/UL (ref 0.8–3.5)
LYMPHOCYTES NFR BLD: 20.2 % (ref 12–49)
MCH RBC QN AUTO: 29 PG (ref 26–34)
MCHC RBC AUTO-ENTMCNC: 33.2 G/DL (ref 30–36.5)
MCV RBC AUTO: 87.5 FL (ref 80–99)
MONOCYTES # BLD: 0.87 K/UL (ref 0–1)
MONOCYTES NFR BLD: 7.6 % (ref 5–13)
NEUTS SEG # BLD: 8.07 K/UL (ref 1.8–8)
NEUTS SEG NFR BLD: 70.3 % (ref 32–75)
NITRITE UR QL STRIP.AUTO: POSITIVE
NRBC # BLD: 0 K/UL (ref 0–0.01)
NRBC BLD-RTO: 0 PER 100 WBC
PH UR STRIP: 6 (ref 5–8)
PLATELET # BLD AUTO: 213 K/UL (ref 150–400)
PMV BLD AUTO: 10.3 FL (ref 8.9–12.9)
POTASSIUM SERPL-SCNC: 3.9 MMOL/L (ref 3.5–5.1)
PROT SERPL-MCNC: 7.2 G/DL (ref 6.4–8.2)
PROT UR STRIP-MCNC: 100 MG/DL
RBC # BLD AUTO: 4.96 M/UL (ref 3.8–5.2)
RBC #/AREA URNS HPF: ABNORMAL /HPF (ref 0–5)
SODIUM SERPL-SCNC: 142 MMOL/L (ref 136–145)
SP GR UR REFRACTOMETRY: 1.01 (ref 1–1.03)
SPECIMEN HOLD: NORMAL
URINE CULTURE IF INDICATED: ABNORMAL
UROBILINOGEN UR QL STRIP.AUTO: 4 EU/DL (ref 0.2–1)
WBC # BLD AUTO: 11.5 K/UL (ref 3.6–11)
WBC URNS QL MICRO: >100 /HPF (ref 0–4)

## 2025-05-17 PROCEDURE — 6360000002 HC RX W HCPCS: Performed by: STUDENT IN AN ORGANIZED HEALTH CARE EDUCATION/TRAINING PROGRAM

## 2025-05-17 PROCEDURE — 83690 ASSAY OF LIPASE: CPT

## 2025-05-17 PROCEDURE — 81001 URINALYSIS AUTO W/SCOPE: CPT

## 2025-05-17 PROCEDURE — 99284 EMERGENCY DEPT VISIT MOD MDM: CPT

## 2025-05-17 PROCEDURE — 80053 COMPREHEN METABOLIC PANEL: CPT

## 2025-05-17 PROCEDURE — 96375 TX/PRO/DX INJ NEW DRUG ADDON: CPT

## 2025-05-17 PROCEDURE — 87086 URINE CULTURE/COLONY COUNT: CPT

## 2025-05-17 PROCEDURE — 2500000003 HC RX 250 WO HCPCS: Performed by: STUDENT IN AN ORGANIZED HEALTH CARE EDUCATION/TRAINING PROGRAM

## 2025-05-17 PROCEDURE — 36415 COLL VENOUS BLD VENIPUNCTURE: CPT

## 2025-05-17 PROCEDURE — 85025 COMPLETE CBC W/AUTO DIFF WBC: CPT

## 2025-05-17 PROCEDURE — 2580000003 HC RX 258: Performed by: STUDENT IN AN ORGANIZED HEALTH CARE EDUCATION/TRAINING PROGRAM

## 2025-05-17 PROCEDURE — 96374 THER/PROPH/DIAG INJ IV PUSH: CPT

## 2025-05-17 RX ORDER — ONDANSETRON 2 MG/ML
4 INJECTION INTRAMUSCULAR; INTRAVENOUS
Status: COMPLETED | OUTPATIENT
Start: 2025-05-17 | End: 2025-05-17

## 2025-05-17 RX ORDER — 0.9 % SODIUM CHLORIDE 0.9 %
1000 INTRAVENOUS SOLUTION INTRAVENOUS ONCE
Status: COMPLETED | OUTPATIENT
Start: 2025-05-17 | End: 2025-05-17

## 2025-05-17 RX ORDER — CEPHALEXIN 500 MG/1
500 CAPSULE ORAL 4 TIMES DAILY
Qty: 28 CAPSULE | Refills: 0 | Status: SHIPPED | OUTPATIENT
Start: 2025-05-17 | End: 2025-05-24

## 2025-05-17 RX ADMIN — ONDANSETRON 4 MG: 2 INJECTION, SOLUTION INTRAMUSCULAR; INTRAVENOUS at 02:03

## 2025-05-17 RX ADMIN — SODIUM CHLORIDE 1000 ML: 0.9 INJECTION, SOLUTION INTRAVENOUS at 02:06

## 2025-05-17 RX ADMIN — CEFTRIAXONE SODIUM 1000 MG: 1 INJECTION, POWDER, FOR SOLUTION INTRAMUSCULAR; INTRAVENOUS at 03:21

## 2025-05-17 ASSESSMENT — PAIN DESCRIPTION - DESCRIPTORS: DESCRIPTORS: ACHING

## 2025-05-17 ASSESSMENT — PAIN SCALES - GENERAL: PAINLEVEL_OUTOF10: 5

## 2025-05-17 ASSESSMENT — PAIN DESCRIPTION - ORIENTATION: ORIENTATION: ANTERIOR;LOWER

## 2025-05-17 ASSESSMENT — PAIN - FUNCTIONAL ASSESSMENT
PAIN_FUNCTIONAL_ASSESSMENT: 0-10
PAIN_FUNCTIONAL_ASSESSMENT: ACTIVITIES ARE NOT PREVENTED

## 2025-05-17 ASSESSMENT — PAIN DESCRIPTION - LOCATION: LOCATION: ABDOMEN

## 2025-05-17 ASSESSMENT — PAIN DESCRIPTION - ONSET: ONSET: ON-GOING

## 2025-05-17 ASSESSMENT — PAIN DESCRIPTION - PAIN TYPE: TYPE: ACUTE PAIN

## 2025-05-17 ASSESSMENT — PAIN DESCRIPTION - FREQUENCY: FREQUENCY: INTERMITTENT

## 2025-05-17 NOTE — ED PROVIDER NOTES
SANJAY Lainez is a 64-year-old female with a history of recurrent UTIs who presents with acute onset of dysuria and suprapubic discomfort that began after waking from sleep last night. She reports gross hematuria during urination shortly before arrival and experienced a wave of nausea earlier today. She denies fever, flank pain, vomiting, or prior episodes of pyelonephritis. She recently underwent a colonoscopy and EGD earlier this week and has had difficulty with perineal hygiene following the bowel prep. She also experienced a mechanical fall at home without acute injury. Additionally, she is under emotional stress today after learning of a close friend’s spouse’s death.      Physical Exam  Vitals reviewed.   Constitutional:       General: She is not in acute distress.     Appearance: Normal appearance.   HENT:      Head: Normocephalic.      Mouth/Throat:      Mouth: Mucous membranes are moist.   Eyes:      Extraocular Movements: Extraocular movements intact.      Pupils: Pupils are equal, round, and reactive to light.   Cardiovascular:      Pulses: Normal pulses.   Pulmonary:      Effort: Pulmonary effort is normal. No respiratory distress.   Abdominal:      Tenderness: There is no right CVA tenderness or left CVA tenderness.      Comments: Minimal suprapubic tenderness   Musculoskeletal:      Cervical back: Neck supple. No rigidity.      Right lower leg: No edema.      Left lower leg: No edema.   Skin:     General: Skin is warm.      Capillary Refill: Capillary refill takes less than 2 seconds.   Neurological:      General: No focal deficit present.      Mental Status: She is alert. Mental status is at baseline.   Psychiatric:         Attention and Perception: Attention normal.         Mood and Affect: Mood normal.         Behavior: Behavior normal.         Thought Content: Thought content normal. Thought content does not include homicidal or suicidal ideation.         Cognition and Memory:

## 2025-05-18 LAB
BACTERIA SPEC CULT: NORMAL
SERVICE CMNT-IMP: NORMAL

## 2025-06-03 ENCOUNTER — CLINICAL DOCUMENTATION (OUTPATIENT)
Facility: CLINIC | Age: 65
End: 2025-06-03

## 2025-06-03 NOTE — PROGRESS NOTES
Per CoverMyMeds on Zepbound states: Your PA has been resolved, no additional PA is required. For further inquiries please contact the number on the back of the member prescription card. (Message [phone])

## 2025-07-03 ENCOUNTER — CLINICAL DOCUMENTATION (OUTPATIENT)
Facility: CLINIC | Age: 65
End: 2025-07-03

## 2025-07-03 NOTE — PROGRESS NOTES
(Key: [id number] Zepbound 2.5MG/0.5ML pen-injectors status: PA Response - DeniedCreated: July 2nd, 2025 [id number]: July 3rd, 2025.

## 2025-07-21 ENCOUNTER — CLINICAL DOCUMENTATION (OUTPATIENT)
Facility: CLINIC | Age: 65
End: 2025-07-21

## 2025-08-18 ENCOUNTER — HOSPITAL ENCOUNTER (EMERGENCY)
Facility: HOSPITAL | Age: 65
Discharge: HOME | End: 2025-08-18
Attending: STUDENT IN AN ORGANIZED HEALTH CARE EDUCATION/TRAINING PROGRAM
Payer: MEDICARE

## 2025-08-18 VITALS
HEIGHT: 62 IN | DIASTOLIC BLOOD PRESSURE: 84 MMHG | RESPIRATION RATE: 16 BRPM | TEMPERATURE: 98.6 F | SYSTOLIC BLOOD PRESSURE: 152 MMHG | HEART RATE: 98 BPM | WEIGHT: 274 LBS | BODY MASS INDEX: 50.42 KG/M2 | OXYGEN SATURATION: 96 %

## 2025-08-18 DIAGNOSIS — K05.10: Primary | ICD-10-CM

## 2025-08-18 LAB
ANION GAP SERPL CALC-SCNC: 7 MMOL/L (ref 2–12)
BASOPHILS # BLD: 0.02 K/UL (ref 0–0.1)
BASOPHILS NFR BLD: 0.3 % (ref 0–1)
BUN SERPL-MCNC: 21 MG/DL (ref 6–20)
BUN/CREAT SERPL: 20 (ref 12–20)
CALCIUM SERPL-MCNC: 8.6 MG/DL (ref 8.5–10.1)
CHLORIDE SERPL-SCNC: 104 MMOL/L (ref 97–108)
CO2 SERPL-SCNC: 30 MMOL/L (ref 21–32)
CREAT SERPL-MCNC: 1.06 MG/DL (ref 0.55–1.02)
DIFFERENTIAL METHOD BLD: NORMAL
EOSINOPHIL # BLD: 0.12 K/UL (ref 0–0.4)
EOSINOPHIL NFR BLD: 1.6 % (ref 0–7)
ERYTHROCYTE [DISTWIDTH] IN BLOOD BY AUTOMATED COUNT: 13.4 % (ref 11.5–14.5)
GLUCOSE SERPL-MCNC: 134 MG/DL (ref 65–100)
HCT VFR BLD AUTO: 41.5 % (ref 35–47)
HGB BLD-MCNC: 13.9 G/DL (ref 11.5–16)
IMM GRANULOCYTES # BLD AUTO: 0.02 K/UL (ref 0–0.04)
IMM GRANULOCYTES NFR BLD AUTO: 0.3 % (ref 0–0.5)
LYMPHOCYTES # BLD: 1.72 K/UL (ref 0.8–3.5)
LYMPHOCYTES NFR BLD: 23.6 % (ref 12–49)
MCH RBC QN AUTO: 29.4 PG (ref 26–34)
MCHC RBC AUTO-ENTMCNC: 33.5 G/DL (ref 30–36.5)
MCV RBC AUTO: 87.7 FL (ref 80–99)
MONOCYTES # BLD: 0.53 K/UL (ref 0–1)
MONOCYTES NFR BLD: 7.3 % (ref 5–13)
NEUTS SEG # BLD: 4.89 K/UL (ref 1.8–8)
NEUTS SEG NFR BLD: 66.9 % (ref 32–75)
NRBC # BLD: 0 K/UL (ref 0–0.01)
NRBC BLD-RTO: 0 PER 100 WBC
PLATELET # BLD AUTO: 195 K/UL (ref 150–400)
PMV BLD AUTO: 10 FL (ref 8.9–12.9)
POTASSIUM SERPL-SCNC: 3.7 MMOL/L (ref 3.5–5.1)
RBC # BLD AUTO: 4.73 M/UL (ref 3.8–5.2)
SODIUM SERPL-SCNC: 141 MMOL/L (ref 136–145)
WBC # BLD AUTO: 7.3 K/UL (ref 3.6–11)

## 2025-08-18 PROCEDURE — 99283 EMERGENCY DEPT VISIT LOW MDM: CPT

## 2025-08-18 PROCEDURE — 36415 COLL VENOUS BLD VENIPUNCTURE: CPT

## 2025-08-18 PROCEDURE — 6370000000 HC RX 637 (ALT 250 FOR IP): Performed by: STUDENT IN AN ORGANIZED HEALTH CARE EDUCATION/TRAINING PROGRAM

## 2025-08-18 PROCEDURE — 80048 BASIC METABOLIC PNL TOTAL CA: CPT

## 2025-08-18 PROCEDURE — 85025 COMPLETE CBC W/AUTO DIFF WBC: CPT

## 2025-08-18 RX ORDER — CHLORHEXIDINE GLUCONATE ORAL RINSE 1.2 MG/ML
15 SOLUTION DENTAL 2 TIMES DAILY
Qty: 420 ML | Refills: 0 | Status: SHIPPED | OUTPATIENT
Start: 2025-08-18 | End: 2025-09-01

## 2025-08-18 RX ORDER — CLINDAMYCIN HYDROCHLORIDE 300 MG/1
300 CAPSULE ORAL 3 TIMES DAILY
Qty: 21 CAPSULE | Refills: 0 | Status: SHIPPED | OUTPATIENT
Start: 2025-08-18 | End: 2025-08-25

## 2025-08-18 RX ADMIN — DIPHENHYDRAMINE HYDROCHLORIDE: 12.5 SOLUTION ORAL at 22:37

## 2025-08-18 ASSESSMENT — PAIN - FUNCTIONAL ASSESSMENT: PAIN_FUNCTIONAL_ASSESSMENT: 0-10

## 2025-08-18 ASSESSMENT — PAIN SCALES - GENERAL
PAINLEVEL_OUTOF10: 2
PAINLEVEL_OUTOF10: 8

## 2025-08-18 ASSESSMENT — PAIN DESCRIPTION - PAIN TYPE: TYPE: ACUTE PAIN

## 2025-08-18 ASSESSMENT — PAIN DESCRIPTION - DESCRIPTORS
DESCRIPTORS: ACHING
DESCRIPTORS: ACHING

## 2025-08-18 ASSESSMENT — PAIN DESCRIPTION - LOCATION
LOCATION: MOUTH
LOCATION: MOUTH

## 2025-08-18 ASSESSMENT — PAIN DESCRIPTION - ORIENTATION: ORIENTATION: RIGHT;LEFT

## (undated) DEVICE — TUBING, SUCTION, 9/32" X 12', STRAIGHT: Brand: MEDLINE INDUSTRIES, INC.

## (undated) DEVICE — BLADE SAW W0.49XL3.15IN THK0.047IN CUT THK0.047IN REPL SAG

## (undated) DEVICE — SYRINGE MED 20ML STD CLR PLAS LUERLOCK TIP N CTRL DISP

## (undated) DEVICE — FILTER SMK EVAC FLO CLR MEGADYNE

## (undated) DEVICE — SPECIMEN RETRIEVAL POUCH: Brand: ENDO CATCH GOLD

## (undated) DEVICE — SYR 10ML LUER LOK 1/5ML GRAD --

## (undated) DEVICE — Device

## (undated) DEVICE — DRAPE XR C ARM 41X74IN LF --

## (undated) DEVICE — INFECTION CONTROL KIT SYS

## (undated) DEVICE — DEVON™ KNEE AND BODY STRAP 60" X 3" (1.5 M X 7.6 CM): Brand: DEVON

## (undated) DEVICE — TROCAR SITE CLOSURE DEVICE: Brand: ENDO CLOSE

## (undated) DEVICE — TUBING INSUFLTN 10FT LUER -- CONVERT TO ITEM 368568

## (undated) DEVICE — REM POLYHESIVE ADULT PATIENT RETURN ELECTRODE: Brand: VALLEYLAB

## (undated) DEVICE — HANDPIECE SET WITH BONE CLEANING TIP AND SUCTION TUBE: Brand: INTERPULSE

## (undated) DEVICE — PADDING CAST W6INXL4YD ST COT RAYON MICROPLEATED HIGHLY

## (undated) DEVICE — KENDALL SCD EXPRESS SLEEVES, KNEE LENGTH, MEDIUM: Brand: KENDALL SCD

## (undated) DEVICE — SNARE ENDOSCP M L240CM W27MM SHTH DIA2.4MM CHN 2.8MM OVL

## (undated) DEVICE — SCRUB DRY SURG EZ SCRUB BRUSH PREOPERATIVE GRN

## (undated) DEVICE — UNIVERSAL FIXATION CANNULA: Brand: VERSAONE

## (undated) DEVICE — SUPPLEMENT DIGESTIVE H2O SOL GI-EASE

## (undated) DEVICE — ZIMMER® STERILE DISPOSABLE TOURNIQUET CUFF WITH PLC, DUAL PORT, SINGLE BLADDER, 34 IN. (86 CM)

## (undated) DEVICE — TRAP SURG QUAD PARABOLA SLOT DSGN SFTY SCRN TRAPEASE

## (undated) DEVICE — PADDING CAST SPEC 6INX4YD COT --

## (undated) DEVICE — HOOD, PEEL-AWAY: Brand: FLYTE

## (undated) DEVICE — SUTURE STRATAFIX SYMMETRIC PDS + SZ 1 L18IN ABSRB VLT L48MM SXPP1A400

## (undated) DEVICE — DRAPE,EXTREMITY,89X128,STERILE: Brand: MEDLINE

## (undated) DEVICE — TOTAL TRAY, 16FR 10ML SIL FOLEY, URN: Brand: MEDLINE

## (undated) DEVICE — ELECTRODE PT RET AD L9FT HI MOIST COND ADH HYDRGEL CORDED

## (undated) DEVICE — CONTAINER,SPECIMEN,3OZ,OR STRL: Brand: MEDLINE

## (undated) DEVICE — SUTURE VCRL SZ 2-0 L36IN ABSRB UD L40MM CT 1/2 CIR J957H

## (undated) DEVICE — SCREW BNE L25MM DIA2.5MM KNEE FULL THRD HEX FEM PERSONA (NOT IMPLANTED)

## (undated) DEVICE — GLOVE SURG SZ 85 L12IN FNGR THK79MIL GRN LTX FREE

## (undated) DEVICE — (D)PREP SKN CHLRAPRP APPL 26ML -- CONVERT TO ITEM 371833

## (undated) DEVICE — DISSECTOR RMFG CURVED 5MM --

## (undated) DEVICE — TAPE,CLOTH/SILK,CURAD,3"X10YD,LF,40/CS: Brand: CURAD

## (undated) DEVICE — TOTAL JOINT - SMH: Brand: MEDLINE INDUSTRIES, INC.

## (undated) DEVICE — GLOVE SURG SZ 8 L12IN FNGR THK94MIL STD WHT LTX FREE

## (undated) DEVICE — SUTURE VCRL SZ 2 L54IN ABSRB UD L65MM TP-1 1/2 CIR J880T

## (undated) DEVICE — GLOVE ORTHO 8   MSG9480

## (undated) DEVICE — SOLUTION IRRIGATION NACL 0.9% 1000 ML FLX CONTAINER

## (undated) DEVICE — NEEDLE HYPO 25GA L1.5IN BVL ORIENTED ECLIPSE

## (undated) DEVICE — STAPLER SKIN LEG L3.9MM DIA0.53MM WIDE ROT HD FOR WND CLSR

## (undated) DEVICE — STRYKER PERFORMANCE SERIES SAGITTAL BLADE: Brand: STRYKER PERFORMANCE SERIES

## (undated) DEVICE — SUTURE MCRYL SZ 4-0 L27IN ABSRB UD L19MM PS-2 1/2 CIR PRIM Y426H

## (undated) DEVICE — BLADE SAW W083XL354IN THK0047IN CUT THK0047IN SAG FLR

## (undated) DEVICE — SUTURE SZ 0 27IN 5/8 CIR UR-6  TAPER PT VIOLET ABSRB VICRYL J603H

## (undated) DEVICE — 4-PORT MANIFOLD: Brand: NEPTUNE 2

## (undated) DEVICE — FORCEPS BX L240CM JAW DIA2.8MM L CAP W/ NDL MIC MESH TOOTH

## (undated) DEVICE — DRSG POSTOP PRMSL AG 3.5X14IN

## (undated) DEVICE — SOLUTION SURG PREP 26 CC PURPREP

## (undated) DEVICE — 3000CC GUARDIAN II: Brand: GUARDIAN

## (undated) DEVICE — SUTURE VCRL SZ 0 L36IN ABSRB VLT L40MM CT 1/2 CIR J358H

## (undated) DEVICE — BLADELESS OPTICAL TROCAR WITH FIXATION CANNULA: Brand: VERSAONE

## (undated) DEVICE — BLADE ASSEMB CLP HAIR FINE --

## (undated) DEVICE — HEADLESS TROCHAR PIN 75MM: Brand: ZUK

## (undated) DEVICE — STERILE POLYISOPRENE POWDER-FREE SURGICAL GLOVES WITH EMOLLIENT COATING: Brand: PROTEXIS

## (undated) DEVICE — GLOVE SURG SZ 65 L12IN FNGR THK79MIL GRN LTX FREE

## (undated) DEVICE — BANDAGE COMPR M W6INXL10YD WHT BGE VELC E MTRX HK AND LOOP

## (undated) DEVICE — BOWL BNE CEM MIX SPAT CURET SMARTMIX CTS

## (undated) DEVICE — PADDING CST 6IN STERILE --

## (undated) DEVICE — GLOVE SURG SZ 65 L12IN FNGR THK94MIL STD WHT LTX FREE

## (undated) DEVICE — SOLUTION IRRIG 1000ML STRL H2O USP PLAS POUR BTL

## (undated) DEVICE — SOLUTION IRRIG 3000ML 0.9% SOD CHL USP UROMATIC PLAS CONT

## (undated) DEVICE — 450 ML BOTTLE OF 0.05% CHLORHEXIDINE GLUCONATE IN 99.95% STERILE WATER FOR IRRIGATION, USP AND APPLICATOR.: Brand: IRRISEPT ANTIMICROBIAL WOUND LAVAGE

## (undated) DEVICE — SPONGE GZ W4XL4IN COT 12 PLY TYP VII WVN C FLD DSGN STERILE

## (undated) DEVICE — SYR 20ML LL STRL LF --

## (undated) DEVICE — INSUFFLATION NEEDLE: Brand: SURGINEEDLE

## (undated) DEVICE — BLADELESS OPTICAL TROCAR WITH FIXATION CANNULA: Brand: VERSAPORT

## (undated) DEVICE — APPLICATOR BNDG 1MM ADH PREMIERPRO EXOFIN

## (undated) DEVICE — BLADE SAW W073XL276IN THK0031IN CUT THK0036IN REPL SAG

## (undated) DEVICE — DRAPE,UTILTY,TAPE,15X26, 4EA/PK: Brand: MEDLINE

## (undated) DEVICE — PADDING CAST W6INXL4YD NONSTERILE COT RAYON MICROPLEATED

## (undated) DEVICE — SURGICAL PROCEDURE KIT GEN LAPAROSCOPY LF

## (undated) DEVICE — T4 HOOD

## (undated) DEVICE — ERASECAUTI INTERMIT TRAY: Brand: MEDLINE INDUSTRIES, INC.

## (undated) DEVICE — APPLIER LIG CLP L13IN 10MM PSTL GRP CONTAIN 15 TI L CLP

## (undated) DEVICE — SYRINGE 20ML LL S/C 50

## (undated) DEVICE — CLICKLINE SCISSORS INSERT: Brand: CLICKLINE